# Patient Record
Sex: FEMALE | Race: WHITE | NOT HISPANIC OR LATINO
[De-identification: names, ages, dates, MRNs, and addresses within clinical notes are randomized per-mention and may not be internally consistent; named-entity substitution may affect disease eponyms.]

---

## 2017-05-09 ENCOUNTER — APPOINTMENT (OUTPATIENT)
Dept: DERMATOLOGY | Facility: CLINIC | Age: 82
End: 2017-05-09

## 2017-05-09 VITALS — HEIGHT: 64 IN | BODY MASS INDEX: 21 KG/M2 | WEIGHT: 123 LBS

## 2017-05-09 DIAGNOSIS — Z78.9 OTHER SPECIFIED HEALTH STATUS: ICD-10-CM

## 2017-05-09 DIAGNOSIS — H91.90 UNSPECIFIED HEARING LOSS, UNSPECIFIED EAR: ICD-10-CM

## 2017-05-09 DIAGNOSIS — H26.9 UNSPECIFIED CATARACT: ICD-10-CM

## 2017-05-09 DIAGNOSIS — Z85.828 PERSONAL HISTORY OF OTHER MALIGNANT NEOPLASM OF SKIN: ICD-10-CM

## 2017-05-09 RX ORDER — MENTHOL/CAMPHOR 0.5 %-0.5%
1000 LOTION (ML) TOPICAL
Refills: 0 | Status: ACTIVE | COMMUNITY

## 2017-11-09 ENCOUNTER — APPOINTMENT (OUTPATIENT)
Dept: DERMATOLOGY | Facility: CLINIC | Age: 82
End: 2017-11-09
Payer: MEDICARE

## 2017-11-09 PROCEDURE — 99213 OFFICE O/P EST LOW 20 MIN: CPT

## 2017-12-11 ENCOUNTER — APPOINTMENT (OUTPATIENT)
Dept: DERMATOLOGY | Facility: CLINIC | Age: 82
End: 2017-12-11
Payer: MEDICARE

## 2017-12-11 DIAGNOSIS — R23.8 OTHER SKIN CHANGES: ICD-10-CM

## 2017-12-11 PROCEDURE — ZZZZZ: CPT

## 2017-12-11 RX ORDER — ATORVASTATIN CALCIUM 10 MG/1
10 TABLET, FILM COATED ORAL
Refills: 0 | Status: DISCONTINUED | COMMUNITY
End: 2017-12-11

## 2017-12-11 RX ORDER — ATORVASTATIN CALCIUM 20 MG/1
20 TABLET, FILM COATED ORAL
Qty: 90 | Refills: 0 | Status: ACTIVE | COMMUNITY
Start: 2017-12-08

## 2018-05-10 ENCOUNTER — APPOINTMENT (OUTPATIENT)
Dept: DERMATOLOGY | Facility: CLINIC | Age: 83
End: 2018-05-10
Payer: MEDICARE

## 2018-05-10 DIAGNOSIS — L85.3 XEROSIS CUTIS: ICD-10-CM

## 2018-05-10 PROCEDURE — 99213 OFFICE O/P EST LOW 20 MIN: CPT

## 2018-07-27 PROBLEM — Z78.9 ALCOHOL USE: Status: ACTIVE | Noted: 2017-05-09

## 2018-11-12 ENCOUNTER — APPOINTMENT (OUTPATIENT)
Dept: DERMATOLOGY | Facility: CLINIC | Age: 83
End: 2018-11-12
Payer: MEDICARE

## 2018-11-12 VITALS — HEIGHT: 64 IN | WEIGHT: 118 LBS | BODY MASS INDEX: 20.14 KG/M2

## 2018-11-12 PROCEDURE — 99213 OFFICE O/P EST LOW 20 MIN: CPT

## 2019-06-25 ENCOUNTER — APPOINTMENT (OUTPATIENT)
Dept: DERMATOLOGY | Facility: CLINIC | Age: 84
End: 2019-06-25
Payer: MEDICARE

## 2019-06-25 PROCEDURE — 99213 OFFICE O/P EST LOW 20 MIN: CPT

## 2019-06-25 RX ORDER — ASPIRIN 81 MG
81 TABLET, DELAYED RELEASE (ENTERIC COATED) ORAL
Refills: 0 | Status: DISCONTINUED | COMMUNITY
End: 2019-06-25

## 2019-06-25 NOTE — HISTORY OF PRESENT ILLNESS
[FreeTextEntry1] : Patient presents for skin examination. [de-identified] : Denies new, changing, bleeding or tender lesions on the skin over the past 6 months.\par

## 2019-06-25 NOTE — PHYSICAL EXAM
[Alert] : alert [Oriented x 3] : ~L oriented x 3 [Full Body Skin Exam Performed] : performed [Well Nourished] : well nourished [FreeTextEntry3] : A full skin exam was performed including the scalp, face, neck, chest, abdomen, back, buttocks, upper extremities and lower extremities.  The patient declined examination of the breasts and genitalia.  \par The exam did not reveal any evidence of skin cancer, showing only the following benign growths:\par Patterson pigmented nevi.\par Seborrheic keratoses.\par Lentigines.\par Cherry angioma.\par \par

## 2019-12-12 ENCOUNTER — APPOINTMENT (OUTPATIENT)
Dept: DERMATOLOGY | Facility: CLINIC | Age: 84
End: 2019-12-12
Payer: MEDICARE

## 2019-12-12 DIAGNOSIS — Z00.00 ENCOUNTER FOR GENERAL ADULT MEDICAL EXAMINATION W/OUT ABNORMAL FINDINGS: ICD-10-CM

## 2019-12-12 PROCEDURE — 99213 OFFICE O/P EST LOW 20 MIN: CPT

## 2019-12-12 NOTE — PHYSICAL EXAM
[Well Nourished] : well nourished [Alert] : alert [Oriented x 3] : ~L oriented x 3 [Full Body Skin Exam Performed] : performed [FreeTextEntry3] : A full skin exam was performed including the scalp, face, neck, chest, abdomen, back, buttocks, upper extremities and lower extremities.  The patient declined examination of the breasts and genitalia.  \par The exam did not reveal any evidence of skin cancer, showing only the following benign growths:\par Seborrheic keratoses.\par Lentigines.\par Cherry angioma.\par \par

## 2019-12-12 NOTE — HISTORY OF PRESENT ILLNESS
[FreeTextEntry1] : Patient presents for skin examination. [de-identified] : Denies new, changing, bleeding or tender lesions on the skin over the past 6 months.\par

## 2020-06-18 ENCOUNTER — APPOINTMENT (OUTPATIENT)
Dept: DERMATOLOGY | Facility: CLINIC | Age: 85
End: 2020-06-18
Payer: MEDICARE

## 2020-06-18 VITALS — BODY MASS INDEX: 20.14 KG/M2 | HEIGHT: 64 IN | WEIGHT: 118 LBS

## 2020-06-18 DIAGNOSIS — D48.5 NEOPLASM OF UNCERTAIN BEHAVIOR OF SKIN: ICD-10-CM

## 2020-06-18 DIAGNOSIS — L57.0 ACTINIC KERATOSIS: ICD-10-CM

## 2020-06-18 DIAGNOSIS — L82.1 OTHER SEBORRHEIC KERATOSIS: ICD-10-CM

## 2020-06-18 PROCEDURE — 99213 OFFICE O/P EST LOW 20 MIN: CPT | Mod: 25

## 2020-06-18 PROCEDURE — 17000 DESTRUCT PREMALG LESION: CPT | Mod: 59

## 2020-06-18 PROCEDURE — 11104 PUNCH BX SKIN SINGLE LESION: CPT

## 2020-06-18 NOTE — HISTORY OF PRESENT ILLNESS
[de-identified] : Notes rough lesion of the anterior scalp.  Present for over a month, with irritation.  No bleeding.  No self tx. [FreeTextEntry1] : Patient presents for skin examination.

## 2020-06-18 NOTE — ASSESSMENT
[FreeTextEntry1] : A complete skin examination was performed.  We discussed the importance of photoprotection, including the use of hats, protective clothing and sunscreens with an SPF of at least 30.  Sun avoidance was also discussed.  The ABCDE's of melanoma was discussed with the patient.  Regular skin exams are encouraged.\par \par r/o melanoma - right shin.\par Will call patient with results when available.

## 2020-06-18 NOTE — PHYSICAL EXAM
[Alert] : alert [Oriented x 3] : ~L oriented x 3 [Well Nourished] : well nourished [FreeTextEntry3] : A full skin exam was performed including the scalp, face, neck, chest, abdomen, back, buttocks, upper extremities and lower extremities.  The patient declined examination of the breasts and genitalia.  \par The exam did not reveal any evidence of skin cancer, showing only the following benign growths:\par Irvine pigmented nevi.\par Seborrheic keratoses.\par Lentigines.\par \par Keratotic papule of the anterior scalp.\par \par Blue-black, irregularly hyperpigmented macule, 2.5 mm, on the right shin.  ELM with central haze. [Full Body Skin Exam Performed] : performed

## 2020-06-25 LAB — CORE LAB BIOPSY: NORMAL

## 2021-04-04 ENCOUNTER — INPATIENT (INPATIENT)
Facility: HOSPITAL | Age: 86
LOS: 1 days | Discharge: HOME | End: 2021-04-06
Attending: INTERNAL MEDICINE | Admitting: INTERNAL MEDICINE
Payer: MEDICARE

## 2021-04-04 VITALS
OXYGEN SATURATION: 100 % | RESPIRATION RATE: 16 BRPM | HEART RATE: 84 BPM | SYSTOLIC BLOOD PRESSURE: 168 MMHG | DIASTOLIC BLOOD PRESSURE: 66 MMHG | TEMPERATURE: 98 F

## 2021-04-04 PROCEDURE — 99285 EMERGENCY DEPT VISIT HI MDM: CPT | Mod: CS,GC

## 2021-04-04 NOTE — ED ADULT TRIAGE NOTE - CHIEF COMPLAINT QUOTE
Pt arrives to ED s/p feeling weak during exercise today at 6pm with belching and "queasiness" which has now passed.  Pt felt weak and lowered herself to the ground.  Pt says her exercises were leg based.  Hx of HTN.  Pt takes baby ASA and Lipitor.  Pt denies CP.   ekg done

## 2021-04-05 DIAGNOSIS — N39.0 URINARY TRACT INFECTION, SITE NOT SPECIFIED: ICD-10-CM

## 2021-04-05 DIAGNOSIS — R55 SYNCOPE AND COLLAPSE: ICD-10-CM

## 2021-04-05 DIAGNOSIS — E78.5 HYPERLIPIDEMIA, UNSPECIFIED: ICD-10-CM

## 2021-04-05 DIAGNOSIS — I25.10 ATHEROSCLEROTIC HEART DISEASE OF NATIVE CORONARY ARTERY WITHOUT ANGINA PECTORIS: ICD-10-CM

## 2021-04-05 DIAGNOSIS — Z29.9 ENCOUNTER FOR PROPHYLACTIC MEASURES, UNSPECIFIED: ICD-10-CM

## 2021-04-05 DIAGNOSIS — I10 ESSENTIAL (PRIMARY) HYPERTENSION: ICD-10-CM

## 2021-04-05 LAB
ALBUMIN SERPL ELPH-MCNC: 4.2 G/DL — SIGNIFICANT CHANGE UP (ref 3.3–5)
ALP SERPL-CCNC: 97 U/L — SIGNIFICANT CHANGE UP (ref 40–120)
ALT FLD-CCNC: 25 U/L — SIGNIFICANT CHANGE UP (ref 4–33)
ANION GAP SERPL CALC-SCNC: 14 MMOL/L — SIGNIFICANT CHANGE UP (ref 7–14)
APPEARANCE UR: ABNORMAL
AST SERPL-CCNC: 29 U/L — SIGNIFICANT CHANGE UP (ref 4–32)
B PERT DNA SPEC QL NAA+PROBE: SIGNIFICANT CHANGE UP
BASOPHILS # BLD AUTO: 0.05 K/UL — SIGNIFICANT CHANGE UP (ref 0–0.2)
BASOPHILS NFR BLD AUTO: 0.4 % — SIGNIFICANT CHANGE UP (ref 0–2)
BILIRUB SERPL-MCNC: 0.4 MG/DL — SIGNIFICANT CHANGE UP (ref 0.2–1.2)
BILIRUB UR-MCNC: NEGATIVE — SIGNIFICANT CHANGE UP
BLOOD GAS VENOUS COMPREHENSIVE RESULT: SIGNIFICANT CHANGE UP
BUN SERPL-MCNC: 34 MG/DL — HIGH (ref 7–23)
C PNEUM DNA SPEC QL NAA+PROBE: SIGNIFICANT CHANGE UP
CALCIUM SERPL-MCNC: 9.6 MG/DL — SIGNIFICANT CHANGE UP (ref 8.4–10.5)
CHLORIDE SERPL-SCNC: 101 MMOL/L — SIGNIFICANT CHANGE UP (ref 98–107)
CO2 SERPL-SCNC: 24 MMOL/L — SIGNIFICANT CHANGE UP (ref 22–31)
COLOR SPEC: YELLOW — SIGNIFICANT CHANGE UP
CREAT SERPL-MCNC: 1.13 MG/DL — SIGNIFICANT CHANGE UP (ref 0.5–1.3)
DIFF PNL FLD: ABNORMAL
EOSINOPHIL # BLD AUTO: 0.01 K/UL — SIGNIFICANT CHANGE UP (ref 0–0.5)
EOSINOPHIL NFR BLD AUTO: 0.1 % — SIGNIFICANT CHANGE UP (ref 0–6)
FLUAV SUBTYP SPEC NAA+PROBE: SIGNIFICANT CHANGE UP
FLUBV RNA SPEC QL NAA+PROBE: SIGNIFICANT CHANGE UP
GLUCOSE SERPL-MCNC: 108 MG/DL — HIGH (ref 70–99)
GLUCOSE UR QL: NEGATIVE — SIGNIFICANT CHANGE UP
HADV DNA SPEC QL NAA+PROBE: SIGNIFICANT CHANGE UP
HCOV 229E RNA SPEC QL NAA+PROBE: SIGNIFICANT CHANGE UP
HCOV HKU1 RNA SPEC QL NAA+PROBE: SIGNIFICANT CHANGE UP
HCOV NL63 RNA SPEC QL NAA+PROBE: SIGNIFICANT CHANGE UP
HCOV OC43 RNA SPEC QL NAA+PROBE: SIGNIFICANT CHANGE UP
HCT VFR BLD CALC: 39.6 % — SIGNIFICANT CHANGE UP (ref 34.5–45)
HGB BLD-MCNC: 13.1 G/DL — SIGNIFICANT CHANGE UP (ref 11.5–15.5)
HMPV RNA SPEC QL NAA+PROBE: SIGNIFICANT CHANGE UP
HPIV1 RNA SPEC QL NAA+PROBE: SIGNIFICANT CHANGE UP
HPIV2 RNA SPEC QL NAA+PROBE: SIGNIFICANT CHANGE UP
HPIV3 RNA SPEC QL NAA+PROBE: SIGNIFICANT CHANGE UP
HPIV4 RNA SPEC QL NAA+PROBE: SIGNIFICANT CHANGE UP
IANC: 12.15 K/UL — HIGH (ref 1.5–8.5)
IMM GRANULOCYTES NFR BLD AUTO: 0.3 % — SIGNIFICANT CHANGE UP (ref 0–1.5)
KETONES UR-MCNC: ABNORMAL
LEUKOCYTE ESTERASE UR-ACNC: ABNORMAL
LYMPHOCYTES # BLD AUTO: 0.99 K/UL — LOW (ref 1–3.3)
LYMPHOCYTES # BLD AUTO: 7.1 % — LOW (ref 13–44)
MCHC RBC-ENTMCNC: 32.2 PG — SIGNIFICANT CHANGE UP (ref 27–34)
MCHC RBC-ENTMCNC: 33.1 GM/DL — SIGNIFICANT CHANGE UP (ref 32–36)
MCV RBC AUTO: 97.3 FL — SIGNIFICANT CHANGE UP (ref 80–100)
MONOCYTES # BLD AUTO: 0.65 K/UL — SIGNIFICANT CHANGE UP (ref 0–0.9)
MONOCYTES NFR BLD AUTO: 4.7 % — SIGNIFICANT CHANGE UP (ref 2–14)
NEUTROPHILS # BLD AUTO: 12.15 K/UL — HIGH (ref 1.8–7.4)
NEUTROPHILS NFR BLD AUTO: 87.4 % — HIGH (ref 43–77)
NITRITE UR-MCNC: NEGATIVE — SIGNIFICANT CHANGE UP
NRBC # BLD: 0 /100 WBCS — SIGNIFICANT CHANGE UP
NRBC # FLD: 0 K/UL — SIGNIFICANT CHANGE UP
PH UR: 5.5 — SIGNIFICANT CHANGE UP (ref 5–8)
PLATELET # BLD AUTO: 187 K/UL — SIGNIFICANT CHANGE UP (ref 150–400)
POTASSIUM SERPL-MCNC: 4.9 MMOL/L — SIGNIFICANT CHANGE UP (ref 3.5–5.3)
POTASSIUM SERPL-SCNC: 4.9 MMOL/L — SIGNIFICANT CHANGE UP (ref 3.5–5.3)
PROT SERPL-MCNC: 7.1 G/DL — SIGNIFICANT CHANGE UP (ref 6–8.3)
PROT UR-MCNC: ABNORMAL
RAPID RVP RESULT: SIGNIFICANT CHANGE UP
RBC # BLD: 4.07 M/UL — SIGNIFICANT CHANGE UP (ref 3.8–5.2)
RBC # FLD: 12.5 % — SIGNIFICANT CHANGE UP (ref 10.3–14.5)
RSV RNA SPEC QL NAA+PROBE: SIGNIFICANT CHANGE UP
RV+EV RNA SPEC QL NAA+PROBE: SIGNIFICANT CHANGE UP
SARS-COV-2 RNA SPEC QL NAA+PROBE: SIGNIFICANT CHANGE UP
SODIUM SERPL-SCNC: 139 MMOL/L — SIGNIFICANT CHANGE UP (ref 135–145)
SP GR SPEC: 1.03 — HIGH (ref 1.01–1.02)
TROPONIN T, HIGH SENSITIVITY RESULT: 14 NG/L — SIGNIFICANT CHANGE UP
TROPONIN T, HIGH SENSITIVITY RESULT: 16 NG/L — SIGNIFICANT CHANGE UP
UROBILINOGEN FLD QL: SIGNIFICANT CHANGE UP
WBC # BLD: 13.89 K/UL — HIGH (ref 3.8–10.5)
WBC # FLD AUTO: 13.89 K/UL — HIGH (ref 3.8–10.5)

## 2021-04-05 PROCEDURE — 71045 X-RAY EXAM CHEST 1 VIEW: CPT | Mod: 26

## 2021-04-05 PROCEDURE — 99223 1ST HOSP IP/OBS HIGH 75: CPT

## 2021-04-05 RX ORDER — ATORVASTATIN CALCIUM 80 MG/1
40 TABLET, FILM COATED ORAL AT BEDTIME
Refills: 0 | Status: DISCONTINUED | OUTPATIENT
Start: 2021-04-05 | End: 2021-04-06

## 2021-04-05 RX ORDER — LISINOPRIL 2.5 MG/1
10 TABLET ORAL
Refills: 0 | Status: DISCONTINUED | OUTPATIENT
Start: 2021-04-05 | End: 2021-04-06

## 2021-04-05 RX ORDER — ASPIRIN/CALCIUM CARB/MAGNESIUM 324 MG
81 TABLET ORAL DAILY
Refills: 0 | Status: DISCONTINUED | OUTPATIENT
Start: 2021-04-05 | End: 2021-04-06

## 2021-04-05 RX ORDER — CEFTRIAXONE 500 MG/1
1000 INJECTION, POWDER, FOR SOLUTION INTRAMUSCULAR; INTRAVENOUS EVERY 24 HOURS
Refills: 0 | Status: DISCONTINUED | OUTPATIENT
Start: 2021-04-05 | End: 2021-04-06

## 2021-04-05 RX ORDER — CEPHALEXIN 500 MG
500 CAPSULE ORAL EVERY 12 HOURS
Refills: 0 | Status: DISCONTINUED | OUTPATIENT
Start: 2021-04-05 | End: 2021-04-05

## 2021-04-05 RX ORDER — ACETAMINOPHEN 500 MG
650 TABLET ORAL ONCE
Refills: 0 | Status: COMPLETED | OUTPATIENT
Start: 2021-04-05 | End: 2021-04-05

## 2021-04-05 RX ADMIN — CEFTRIAXONE 100 MILLIGRAM(S): 500 INJECTION, POWDER, FOR SOLUTION INTRAMUSCULAR; INTRAVENOUS at 13:40

## 2021-04-05 RX ADMIN — LISINOPRIL 10 MILLIGRAM(S): 2.5 TABLET ORAL at 17:30

## 2021-04-05 RX ADMIN — ATORVASTATIN CALCIUM 40 MILLIGRAM(S): 80 TABLET, FILM COATED ORAL at 22:28

## 2021-04-05 RX ADMIN — Medication 650 MILLIGRAM(S): at 03:01

## 2021-04-05 RX ADMIN — Medication 81 MILLIGRAM(S): at 13:40

## 2021-04-05 RX ADMIN — Medication 500 MILLIGRAM(S): at 04:07

## 2021-04-05 NOTE — H&P ADULT - PROBLEM SELECTOR PLAN 2
Continue Keflex.   Check urine culture. Switch Keflex to Ceftriaxone.    Check urine culture.  D/C planning likely on 4/6.

## 2021-04-05 NOTE — H&P ADULT - HISTORY OF PRESENT ILLNESS
This is an 88 y/o F adm with PMHx of HTN, Hyperlipidemia, non-obstructive CAD presents with c/o feeling weak and SOB. Pt was doing her usual routine of exercising at home watching a video when she felt weak, SOB and felt nauseous.  Pt sat down on the ground and was too weak to get up for about an hour.  Pt felt better after an hour and went to the couch.  She called her daughter who brought her to [facility].  Pt did not have palpitations, chest pain, LOC, Vomiting, Diarrhea, fever, chills.  Pt did notice she has been having urinary urgency.  Pt has never had these symptoms before.  Pt is active and does cardio exercise 3 times a week and goes walking.  Pt had an Echo and Stress Test February 2021 which did not show any acute changes.      This is an 88 y/o F adm with PMHx of HTN, Hyperlipidemia, non-obstructive CAD presents with c/o feeling weak and SOB. Pt was doing her usual routine of exercising at home watching a video when she felt weak, faint, SOB and felt nauseous.  Pt sat down on the ground and was too weak to get up for about an hour.  Pt felt better after an hour and went to the couch.  She called her daughter who brought her to [facility].  Pt did not have palpitations, chest pain, LOC, Vomiting, Diarrhea, fever, chills.  Pt did notice she has been having urinary urgency.  Pt has never had these symptoms before.  Pt is active and does cardio exercise 3 times a week and goes walking.  Pt had an Echo and Stress Test February 2021 which did not show any acute changes.      This is an 88 y/o F adm with PMHx of HTN, Hyperlipidemia, MVP, non-obstructive CAD presents with c/o feeling weak and SOB. Pt was doing her usual routine of exercising at home watching a video when she felt weak, faint, SOB and felt nauseous.  Pt sat down on the ground and was too weak to get up for about an hour.  Pt felt better after an hour and went to the couch.  She called her daughter who brought her to [facility].  Pt did not have palpitations, chest pain, LOC, Vomiting, Diarrhea, fever, chills.  Pt did notice she has been having urinary urgency.  Pt has never had these symptoms before.  Pt is active and does cardio exercise 3 times a week and goes walking.  Pt had an Echo and Stress Test February 2021 which did not show any acute changes.

## 2021-04-05 NOTE — H&P ADULT - ASSESSMENT
This is an 88 y/o F adm with PMHx of HTN, Hyperlipidemia, non-obstructive CAD presents with c/o feeling weak and SOB. Pt diagnosed with UTI.  This is an 86 y/o F adm with PMHx of HTN, Hyperlipidemia, non-obstructive CAD presents with c/o feeling weak and SOB, Near Syncope. Pt diagnosed with UTI.  This is an 88 y/o F adm with PMHx of HTN, Hyperlipidemia, non-obstructive CAD, MVP presents with c/o feeling weak and SOB, Near Syncope. Pt diagnosed with UTI.

## 2021-04-05 NOTE — H&P ADULT - ATTENDING COMMENTS
86 yo F with hx of HTN p/w near syncope found to have UTI. will start ceftriaxone. f/u urine cx. obtain echo r/o structural heart disease given hx of mitral prolapse. check orthostatics. anticipate dc tomorrow if remains stable

## 2021-04-05 NOTE — ED ADULT NURSE NOTE - OBJECTIVE STATEMENT
88 yo F AAOx4 received to room 11 c/o generalized weakness s/p exercise tonight, denies SOB palpitations CP, appears well and in NAD, pending MD medina, no further orders at this time

## 2021-04-05 NOTE — ED PROVIDER NOTE - PHYSICAL EXAMINATION
Dr Bailey  well appearing female. mmm. EOMI. non icteric. no facial asymmetry no slurred speech  normal S1-S2  No resp distress. able to speak in full and clear sentences. no wheeze, rales or stridor.  soft nontender abdomen. no  rebound. no guarding. no sign of trauma. no CVAT   no pedal edema. no calf tenderness. normal pulses bilateral feet.  AO3 Dr Bailey  well appearing female. mmm. EOMI. non icteric. no facial asymmetry no slurred speech  normal S1-S2  No resp distress. able to speak in full and clear sentences. no wheeze, rales or stridor.  soft nontender abdomen. no  rebound. no guarding. no sign of trauma. no CVAT   no pedal edema. no calf tenderness. normal pulses bilateral feet.  AO3    Aenesh Tompkins, DO PGY-1:   CONSTITUTIONAL: well-appearing, in NAD  SKIN: Warm dry, normal skin turgor  HEAD: NCAT  EYES: EOMI, PERRLA, no scleral icterus, conjunctiva pink  ENT: normal pharynx with no erythema or exudates  NECK: Supple; non tender. Full ROM.  CARD: RRR, no murmurs, no carotid bruits  RESP: clear to ausculation b/l. No crackles or wheezing.  ABD: soft, non-tender, non-distended, no rebound or guarding.  EXT: No pedal edema, no calf tenderness  NEURO: normal motor. normal sensory.   PSYCH: Cooperative, appropriate.

## 2021-04-05 NOTE — ED PROVIDER NOTE - OBJECTIVE STATEMENT
Dr Bailey  88yo F hx of hypothyroidism pw episode of weakness this evening at 630pm. Pt was home exercising, felt her body weak, lowered herself to the floor. Sat down for a while, able to "crawl to couch" felt nauseous and sweaty.  No palpitations. No SOB or MARTINEZ. no chest pain. no focal weakness or numbness. Currently denies any complaints. States had an echo and stress test 6 weeks ago. Has been vaccinated. no travel hx. no fever or chills. no leg swelling or calf pain

## 2021-04-05 NOTE — H&P ADULT - PROBLEM SELECTOR PLAN 1
Check Orthostatics.   Encouraged PO hydration. Check Orthostatics.   Encourage PO hydration.  Monitor on Telemetry overnight.   2D Echo ordered.

## 2021-04-05 NOTE — H&P ADULT - NSICDXPASTMEDICALHX_GEN_ALL_CORE_FT
PAST MEDICAL HISTORY:  CAD (coronary artery disease) Non-obstructive- Had an angiogram years ago    HTN (hypertension)     Hyperlipidemia      PAST MEDICAL HISTORY:  CAD (coronary artery disease) Non-obstructive- Had an angiogram years ago    HTN (hypertension)     Hyperlipidemia     MVP (mitral valve prolapse)

## 2021-04-06 ENCOUNTER — TRANSCRIPTION ENCOUNTER (OUTPATIENT)
Age: 86
End: 2021-04-06

## 2021-04-06 VITALS
SYSTOLIC BLOOD PRESSURE: 155 MMHG | RESPIRATION RATE: 17 BRPM | HEART RATE: 98 BPM | OXYGEN SATURATION: 100 % | TEMPERATURE: 98 F | DIASTOLIC BLOOD PRESSURE: 60 MMHG

## 2021-04-06 LAB
ALBUMIN SERPL ELPH-MCNC: 3.9 G/DL — SIGNIFICANT CHANGE UP (ref 3.3–5)
ALP SERPL-CCNC: 81 U/L — SIGNIFICANT CHANGE UP (ref 40–120)
ALT FLD-CCNC: 22 U/L — SIGNIFICANT CHANGE UP (ref 4–33)
ANION GAP SERPL CALC-SCNC: 10 MMOL/L — SIGNIFICANT CHANGE UP (ref 7–14)
AST SERPL-CCNC: 26 U/L — SIGNIFICANT CHANGE UP (ref 4–32)
BASOPHILS # BLD AUTO: 0.06 K/UL — SIGNIFICANT CHANGE UP (ref 0–0.2)
BASOPHILS NFR BLD AUTO: 0.7 % — SIGNIFICANT CHANGE UP (ref 0–2)
BILIRUB SERPL-MCNC: 0.6 MG/DL — SIGNIFICANT CHANGE UP (ref 0.2–1.2)
BUN SERPL-MCNC: 28 MG/DL — HIGH (ref 7–23)
CALCIUM SERPL-MCNC: 9.2 MG/DL — SIGNIFICANT CHANGE UP (ref 8.4–10.5)
CHLORIDE SERPL-SCNC: 102 MMOL/L — SIGNIFICANT CHANGE UP (ref 98–107)
CO2 SERPL-SCNC: 25 MMOL/L — SIGNIFICANT CHANGE UP (ref 22–31)
COVID-19 SPIKE DOMAIN AB INTERP: POSITIVE
COVID-19 SPIKE DOMAIN ANTIBODY RESULT: >250 U/ML — HIGH
CREAT SERPL-MCNC: 0.92 MG/DL — SIGNIFICANT CHANGE UP (ref 0.5–1.3)
CULTURE RESULTS: NO GROWTH — SIGNIFICANT CHANGE UP
EOSINOPHIL # BLD AUTO: 0.14 K/UL — SIGNIFICANT CHANGE UP (ref 0–0.5)
EOSINOPHIL NFR BLD AUTO: 1.6 % — SIGNIFICANT CHANGE UP (ref 0–6)
GLUCOSE SERPL-MCNC: 87 MG/DL — SIGNIFICANT CHANGE UP (ref 70–99)
HCT VFR BLD CALC: 39.4 % — SIGNIFICANT CHANGE UP (ref 34.5–45)
HGB BLD-MCNC: 12.6 G/DL — SIGNIFICANT CHANGE UP (ref 11.5–15.5)
IANC: 6.12 K/UL — SIGNIFICANT CHANGE UP (ref 1.5–8.5)
IMM GRANULOCYTES NFR BLD AUTO: 0.3 % — SIGNIFICANT CHANGE UP (ref 0–1.5)
LYMPHOCYTES # BLD AUTO: 1.43 K/UL — SIGNIFICANT CHANGE UP (ref 1–3.3)
LYMPHOCYTES # BLD AUTO: 16.6 % — SIGNIFICANT CHANGE UP (ref 13–44)
MCHC RBC-ENTMCNC: 31.3 PG — SIGNIFICANT CHANGE UP (ref 27–34)
MCHC RBC-ENTMCNC: 32 GM/DL — SIGNIFICANT CHANGE UP (ref 32–36)
MCV RBC AUTO: 98 FL — SIGNIFICANT CHANGE UP (ref 80–100)
MONOCYTES # BLD AUTO: 0.84 K/UL — SIGNIFICANT CHANGE UP (ref 0–0.9)
MONOCYTES NFR BLD AUTO: 9.7 % — SIGNIFICANT CHANGE UP (ref 2–14)
NEUTROPHILS # BLD AUTO: 6.12 K/UL — SIGNIFICANT CHANGE UP (ref 1.8–7.4)
NEUTROPHILS NFR BLD AUTO: 71.1 % — SIGNIFICANT CHANGE UP (ref 43–77)
NRBC # BLD: 0 /100 WBCS — SIGNIFICANT CHANGE UP
NRBC # FLD: 0 K/UL — SIGNIFICANT CHANGE UP
PLATELET # BLD AUTO: 171 K/UL — SIGNIFICANT CHANGE UP (ref 150–400)
POTASSIUM SERPL-MCNC: 4.1 MMOL/L — SIGNIFICANT CHANGE UP (ref 3.5–5.3)
POTASSIUM SERPL-SCNC: 4.1 MMOL/L — SIGNIFICANT CHANGE UP (ref 3.5–5.3)
PROT SERPL-MCNC: 6.6 G/DL — SIGNIFICANT CHANGE UP (ref 6–8.3)
RBC # BLD: 4.02 M/UL — SIGNIFICANT CHANGE UP (ref 3.8–5.2)
RBC # FLD: 12.3 % — SIGNIFICANT CHANGE UP (ref 10.3–14.5)
SARS-COV-2 IGG+IGM SERPL QL IA: >250 U/ML — HIGH
SARS-COV-2 IGG+IGM SERPL QL IA: POSITIVE
SODIUM SERPL-SCNC: 137 MMOL/L — SIGNIFICANT CHANGE UP (ref 135–145)
SPECIMEN SOURCE: SIGNIFICANT CHANGE UP
WBC # BLD: 8.62 K/UL — SIGNIFICANT CHANGE UP (ref 3.8–10.5)
WBC # FLD AUTO: 8.62 K/UL — SIGNIFICANT CHANGE UP (ref 3.8–10.5)

## 2021-04-06 PROCEDURE — 93306 TTE W/DOPPLER COMPLETE: CPT | Mod: 26

## 2021-04-06 RX ORDER — CEFUROXIME AXETIL 250 MG
1 TABLET ORAL
Qty: 10 | Refills: 0
Start: 2021-04-06 | End: 2021-04-10

## 2021-04-06 RX ADMIN — LISINOPRIL 10 MILLIGRAM(S): 2.5 TABLET ORAL at 06:26

## 2021-04-06 RX ADMIN — CEFTRIAXONE 100 MILLIGRAM(S): 500 INJECTION, POWDER, FOR SOLUTION INTRAMUSCULAR; INTRAVENOUS at 13:43

## 2021-04-06 RX ADMIN — Medication 81 MILLIGRAM(S): at 13:42

## 2021-04-06 NOTE — DISCHARGE NOTE PROVIDER - PROVIDER TOKENS
FREE:[LAST:[Follow-up],PHONE:[(   )    -],FAX:[(   )    -],ADDRESS:[with Dr. Gamaliel Farfan within 1 week.]]

## 2021-04-06 NOTE — DISCHARGE NOTE PROVIDER - HOSPITAL COURSE
This is an 88 y/o F adm with PMHx of HTN, Hyperlipidemia, non-obstructive CAD, MVP presents with c/o feeling weak and SOB, Near Syncope. Pt diagnosed with UTI.       Near syncope.  Orthostatics negative.   Encourage PO hydration.  Monitor on Telemetry overnight.   2D Echo revealed.........................................       ·  UTI (urinary tract infection).  Plan: Switch Keflex to Ceftriaxone.    Check urine culture.      · HTN (hypertension).  Plan: Continue Lisinopril.   Monitor blood pressure.       · Hyperlipidemia.  Plan: Continue Lipitor.   Check fasting lipid profile.        ·CAD (coronary artery disease).  Plan: Continue ASA 81mg.     DVT prophylaxis. Plan: Pt is ambulating, improve score low risk. On ASA.    Case discussed with......................, pt is medically stable for discharge home today with outpatient follow-up.          This is an 86 y/o F adm with PMHx of HTN, Hyperlipidemia, non-obstructive CAD, MVP presents with c/o feeling weak and SOB, Near Syncope. Pt diagnosed with UTI.       1. Near syncope.  Orthostatics done.   Encourage PO hydration.  Monitor on Telemetry.   2D Echo revealed.........................................       2. UTI (urinary tract infection).  Plan: Switch Keflex to Ceftriaxone.    Urine culture pending.      3. HTN (hypertension).  Plan: Continue Lisinopril.   Monitor blood pressure.       4. Hyperlipidemia.  Plan: Continue Lipitor.   Check fasting lipid profile.        5. CAD (coronary artery disease).  Plan: Continue ASA 81mg.       Case discussed with Dr. Cheng, pt is medically stable for discharge home today with outpatient follow-up.          This is an 88 y/o F with PMHx of HTN, Hyperlipidemia, non-obstructive CAD, MVP presents with c/o feeling weak and SOB admitted for near syncope workup and UTI.      1. Near syncope.  Orthostatics done.   - Encourage PO hydration.  - Monitor on Telemetry.   - 2D Echo revealed normal EF, no RWMA.      2. UTI (urinary tract infection).    - On IV Rocephin 1g in-house  - UCx pending  - DC on Ceftin x  5 days to complete 7-day course      3. HTN (hypertension).    - C/w Lisinopril in house  - Monitor BP      4. Hyperlipidemia.   - C/w Statin        5. CAD (coronary artery disease).    - Continue ASA 81mg.       Case discussed with Dr. Cheng, pt is medically stable for discharge home today with outpatient follow-up.

## 2021-04-06 NOTE — DISCHARGE NOTE NURSING/CASE MANAGEMENT/SOCIAL WORK - PATIENT PORTAL LINK FT
You can access the FollowMyHealth Patient Portal offered by [facility] by registering at the following website: [email or web address]. By joining [employer]’s FollowMyHealth portal, you will also be able to view your health information using other applications (apps) compatible with our system.

## 2021-04-06 NOTE — DISCHARGE NOTE PROVIDER - NSDCCPCAREPLAN_GEN_ALL_CORE_FT
PRINCIPAL DISCHARGE DIAGNOSIS  Diagnosis: UTI (urinary tract infection)  Assessment and Plan of Treatment: You were diagnosed with a UTI.   Continue............  Follow-up with Dr. Gamaliel Farfan within 1 week.      SECONDARY DISCHARGE DIAGNOSES  Diagnosis: HTN (hypertension)  Assessment and Plan of Treatment: HTN (hypertension)  - Continue low salt diet.   - Continue Lisinopril.  - Monitor your blood pressure.    Diagnosis: Hyperlipidemia  Assessment and Plan of Treatment: Hyperlipidemia  - Continue Lipitor.   - Monitor your bloodwork with your PCP.    Diagnosis: Near syncope  Assessment and Plan of Treatment: Near syncope  - You had a Sonogram of the heart done which revealed................  - Follow-up with Dr. Farfan within 1 week.     PRINCIPAL DISCHARGE DIAGNOSIS  Diagnosis: UTI (urinary tract infection)  Assessment and Plan of Treatment: You were diagnosed with a UTI.  You will continue taking Ceftin 500mg twice daily for 5 more days to complete treatment for your UTI.  Follow-up with Dr. Gamaliel Farfan within 1 week.      SECONDARY DISCHARGE DIAGNOSES  Diagnosis: Near syncope  Assessment and Plan of Treatment: Near syncope  - You had a Sonogram of the heart done which revealed overall normal function of your heart. Follow-up with Dr. Farfan within 1 week.    Diagnosis: HTN (hypertension)  Assessment and Plan of Treatment: HTN (hypertension)  - Continue low salt diet.   - Continue Lisinopril.  - Monitor your blood pressure.    Diagnosis: Hyperlipidemia  Assessment and Plan of Treatment: Hyperlipidemia  - Continue Lipitor.   - Monitor your bloodwork with your PCP.

## 2021-04-06 NOTE — DISCHARGE NOTE PROVIDER - NSDCMRMEDTOKEN_GEN_ALL_CORE_FT
Aspirin Enteric Coated 81 mg oral delayed release tablet: 1 tab(s) orally once a day  Lipitor 40 mg oral tablet: 1 tab(s) orally once a day  lisinopril 10 mg oral tablet: 1 tab(s) orally 2 times a day   Aspirin Enteric Coated 81 mg oral delayed release tablet: 1 tab(s) orally once a day  cefuroxime 500 mg oral tablet: 1 tab(s) orally 2 times a day   Lipitor 40 mg oral tablet: 1 tab(s) orally once a day  lisinopril 10 mg oral tablet: 1 tab(s) orally 2 times a day

## 2021-04-06 NOTE — DISCHARGE NOTE PROVIDER - CARE PROVIDER_API CALL
Follow-up,   with Dr. Gamaliel Farfan within 1 week.  Phone: (   )    -  Fax: (   )    -  Follow Up Time:

## 2021-05-06 ENCOUNTER — EMERGENCY (EMERGENCY)
Facility: HOSPITAL | Age: 86
LOS: 1 days | Discharge: HOME | End: 2021-05-06
Attending: EMERGENCY MEDICINE | Admitting: EMERGENCY MEDICINE
Payer: MEDICARE

## 2021-05-06 VITALS
SYSTOLIC BLOOD PRESSURE: 170 MMHG | HEIGHT: 63 IN | RESPIRATION RATE: 16 BRPM | HEART RATE: 64 BPM | OXYGEN SATURATION: 100 % | TEMPERATURE: 98 F | DIASTOLIC BLOOD PRESSURE: 65 MMHG

## 2021-05-06 PROCEDURE — 93010 ELECTROCARDIOGRAM REPORT: CPT

## 2021-05-06 PROCEDURE — 99284 EMERGENCY DEPT VISIT MOD MDM: CPT | Mod: 25,GC

## 2021-05-06 NOTE — ED ADULT TRIAGE NOTE - CHIEF COMPLAINT QUOTE
Pt c/o an episode of generalized weakness, dizziness with double vision at approx 9pm while standing, reports dizziness/double vision has resolved. Also c/o an episode of incontinence. Denies any pain/discomfort. No neuro deficits noted at this time. . No stroke code to be called as per MD Butler. PMHx CAD, htn, hld

## 2021-05-07 PROBLEM — I10 ESSENTIAL (PRIMARY) HYPERTENSION: Chronic | Status: ACTIVE | Noted: 2021-04-05

## 2021-05-07 PROBLEM — I34.1 NONRHEUMATIC MITRAL (VALVE) PROLAPSE: Chronic | Status: ACTIVE | Noted: 2021-04-05

## 2021-05-07 PROBLEM — I25.10 ATHEROSCLEROTIC HEART DISEASE OF NATIVE CORONARY ARTERY WITHOUT ANGINA PECTORIS: Chronic | Status: ACTIVE | Noted: 2021-04-05

## 2021-05-07 PROBLEM — E78.5 HYPERLIPIDEMIA, UNSPECIFIED: Chronic | Status: ACTIVE | Noted: 2021-04-05

## 2021-05-07 LAB
ALBUMIN SERPL ELPH-MCNC: 4.3 G/DL — SIGNIFICANT CHANGE UP (ref 3.3–5)
ALP SERPL-CCNC: 98 U/L — SIGNIFICANT CHANGE UP (ref 40–120)
ALT FLD-CCNC: 25 U/L — SIGNIFICANT CHANGE UP (ref 4–33)
ANION GAP SERPL CALC-SCNC: 11 MMOL/L — SIGNIFICANT CHANGE UP (ref 7–14)
APPEARANCE UR: CLEAR — SIGNIFICANT CHANGE UP
AST SERPL-CCNC: 28 U/L — SIGNIFICANT CHANGE UP (ref 4–32)
BACTERIA # UR AUTO: ABNORMAL
BASOPHILS # BLD AUTO: 0.08 K/UL — SIGNIFICANT CHANGE UP (ref 0–0.2)
BASOPHILS NFR BLD AUTO: 0.5 % — SIGNIFICANT CHANGE UP (ref 0–2)
BILIRUB SERPL-MCNC: 0.4 MG/DL — SIGNIFICANT CHANGE UP (ref 0.2–1.2)
BILIRUB UR-MCNC: NEGATIVE — SIGNIFICANT CHANGE UP
BUN SERPL-MCNC: 28 MG/DL — HIGH (ref 7–23)
CALCIUM SERPL-MCNC: 10.1 MG/DL — SIGNIFICANT CHANGE UP (ref 8.4–10.5)
CHLORIDE SERPL-SCNC: 103 MMOL/L — SIGNIFICANT CHANGE UP (ref 98–107)
CO2 SERPL-SCNC: 26 MMOL/L — SIGNIFICANT CHANGE UP (ref 22–31)
COLOR SPEC: YELLOW — SIGNIFICANT CHANGE UP
CREAT SERPL-MCNC: 0.84 MG/DL — SIGNIFICANT CHANGE UP (ref 0.5–1.3)
DIFF PNL FLD: NEGATIVE — SIGNIFICANT CHANGE UP
EOSINOPHIL # BLD AUTO: 0.11 K/UL — SIGNIFICANT CHANGE UP (ref 0–0.5)
EOSINOPHIL NFR BLD AUTO: 0.7 % — SIGNIFICANT CHANGE UP (ref 0–6)
EPI CELLS # UR: 3 /HPF — SIGNIFICANT CHANGE UP (ref 0–5)
GLUCOSE SERPL-MCNC: 94 MG/DL — SIGNIFICANT CHANGE UP (ref 70–99)
GLUCOSE UR QL: NEGATIVE — SIGNIFICANT CHANGE UP
HCT VFR BLD CALC: 40.2 % — SIGNIFICANT CHANGE UP (ref 34.5–45)
HGB BLD-MCNC: 13 G/DL — SIGNIFICANT CHANGE UP (ref 11.5–15.5)
HYALINE CASTS # UR AUTO: 10 /LPF — HIGH (ref 0–7)
IANC: 14.39 K/UL — HIGH (ref 1.5–8.5)
IMM GRANULOCYTES NFR BLD AUTO: 0.3 % — SIGNIFICANT CHANGE UP (ref 0–1.5)
KETONES UR-MCNC: NEGATIVE — SIGNIFICANT CHANGE UP
LEUKOCYTE ESTERASE UR-ACNC: ABNORMAL
LYMPHOCYTES # BLD AUTO: 0.99 K/UL — LOW (ref 1–3.3)
LYMPHOCYTES # BLD AUTO: 6 % — LOW (ref 13–44)
MCHC RBC-ENTMCNC: 31.6 PG — SIGNIFICANT CHANGE UP (ref 27–34)
MCHC RBC-ENTMCNC: 32.3 GM/DL — SIGNIFICANT CHANGE UP (ref 32–36)
MCV RBC AUTO: 97.8 FL — SIGNIFICANT CHANGE UP (ref 80–100)
MONOCYTES # BLD AUTO: 0.95 K/UL — HIGH (ref 0–0.9)
MONOCYTES NFR BLD AUTO: 5.7 % — SIGNIFICANT CHANGE UP (ref 2–14)
NEUTROPHILS # BLD AUTO: 14.39 K/UL — HIGH (ref 1.8–7.4)
NEUTROPHILS NFR BLD AUTO: 86.8 % — HIGH (ref 43–77)
NITRITE UR-MCNC: NEGATIVE — SIGNIFICANT CHANGE UP
NRBC # BLD: 0 /100 WBCS — SIGNIFICANT CHANGE UP
NRBC # FLD: 0 K/UL — SIGNIFICANT CHANGE UP
PH UR: 6 — SIGNIFICANT CHANGE UP (ref 5–8)
PLATELET # BLD AUTO: 179 K/UL — SIGNIFICANT CHANGE UP (ref 150–400)
POTASSIUM SERPL-MCNC: 3.7 MMOL/L — SIGNIFICANT CHANGE UP (ref 3.5–5.3)
POTASSIUM SERPL-SCNC: 3.7 MMOL/L — SIGNIFICANT CHANGE UP (ref 3.5–5.3)
PROT SERPL-MCNC: 7.7 G/DL — SIGNIFICANT CHANGE UP (ref 6–8.3)
PROT UR-MCNC: ABNORMAL
RBC # BLD: 4.11 M/UL — SIGNIFICANT CHANGE UP (ref 3.8–5.2)
RBC # FLD: 12.3 % — SIGNIFICANT CHANGE UP (ref 10.3–14.5)
RBC CASTS # UR COMP ASSIST: 7 /HPF — HIGH (ref 0–4)
SODIUM SERPL-SCNC: 140 MMOL/L — SIGNIFICANT CHANGE UP (ref 135–145)
SP GR SPEC: 1.03 — HIGH (ref 1.01–1.02)
UROBILINOGEN FLD QL: ABNORMAL
WBC # BLD: 16.57 K/UL — HIGH (ref 3.8–10.5)
WBC # FLD AUTO: 16.57 K/UL — HIGH (ref 3.8–10.5)
WBC UR QL: 24 /HPF — HIGH (ref 0–5)

## 2021-05-07 RX ORDER — CEFPODOXIME PROXETIL 100 MG
100 TABLET ORAL ONCE
Refills: 0 | Status: DISCONTINUED | OUTPATIENT
Start: 2021-05-07 | End: 2021-05-10

## 2021-05-07 RX ORDER — SODIUM CHLORIDE 9 MG/ML
500 INJECTION INTRAMUSCULAR; INTRAVENOUS; SUBCUTANEOUS ONCE
Refills: 0 | Status: COMPLETED | OUTPATIENT
Start: 2021-05-07 | End: 2021-05-07

## 2021-05-07 RX ORDER — CEFPODOXIME PROXETIL 100 MG
1 TABLET ORAL
Qty: 14 | Refills: 0
Start: 2021-05-07 | End: 2021-05-13

## 2021-05-07 NOTE — ED PROVIDER NOTE - PATIENT PORTAL LINK FT
You can access the FollowMyHealth Patient Portal offered by [facility] by registering at the following website: [email or web address]. By joining [employer]’s FollowMyHealth portal, you will also be able to view your health information using other applications (apps) compatible with our system.

## 2021-05-07 NOTE — ED PROVIDER NOTE - PMH
CAD (coronary artery disease)  Non-obstructive- Had an angiogram years ago  HTN (hypertension)    Hyperlipidemia    MVP (mitral valve prolapse)

## 2021-05-07 NOTE — ED PROVIDER NOTE - CLINICAL SUMMARY MEDICAL DECISION MAKING FREE TEXT BOX
87F presents after an episode of dizziness, likely orthostatic vs vasovagal. May be from UTI again vs decreased PO today. Will check EKG, basics, UA. Pt well appearing will likely DC after workup with follow up to PMD.

## 2021-05-07 NOTE — ED PROVIDER NOTE - NSFOLLOWUPINSTRUCTIONS_ED_ALL_ED_FT
1. TAKE ALL MEDICATIONS AS DIRECTED.    2. FOR PAIN YOU CAN TAKE IBUPROFEN (MOTRIN, ADVIL) OR ACETAMINOPHEN (TYLENOL) AS NEEDED, AS DIRECTED ON PACKAGING.  3. FOLLOW UP WITH YOUR PRIMARY DOCTOR WITHIN 5 DAYS AS DIRECTED.  4. IF YOU HAD LABS OR IMAGING DONE, YOU WERE GIVEN COPIES OF ALL LABS AND/OR IMAGING RESULTS FROM YOUR ER VISIT--PLEASE TAKE THEM WITH YOU TO YOUR FOLLOW UP APPOINTMENTS.  5. IF NEEDED, CALL PATIENT ACCESS SERVICES AT [phone] TO FIND A PRIMARY CARE PHYSICIAN.  OR CALL [phone] TO MAKE AN APPOINTMENT WITH THE CLINIC.  6. RETURN TO THE ER FOR ANY WORSENING SYMPTOMS OR CONCERNS.     Urinary Tract Infection    A urinary tract infection (UTI) is an infection of any part of the urinary tract, which includes the kidneys, ureters, bladder, and urethra. Risk factors include ignoring your need to urinate, wiping back to front if female, being an uncircumcised male, and having diabetes or a weak immune system. Symptoms include frequent urination, pain or burning with urination, foul smelling urine, cloudy urine, pain in the lower abdomen, blood in the urine, and fever. If you were prescribed an antibiotic medicine, take it as told by your health care provider. Do not stop taking the antibiotic even if you start to feel better.    SEEK IMMEDIATE MEDICAL CARE IF YOU HAVE ANY OF THE FOLLOWING SYMPTOMS: severe back or abdominal pain, fever, inability to keep fluids or medicine down, dizziness/lightheadedness, or a change in mental status.

## 2021-05-07 NOTE — ED PROVIDER NOTE - PHYSICAL EXAMINATION
General: Well developed, well nourished  HEENT: Normocephalic and atraumatic, Trachea midline.   Cardiac: Normal S1 and S2 w/ RRR. No MRG.  Pulmonary: CTA bilaterally. No increased WOB.   Abdominal: Soft, NTND  Neurologic: CN II-XII intact. Muscle strength 5/5 in all extremities. tandem gait nl   Musculoskeletal: No limited ROM.  Vascular: Warm and well perfused  Skin: Color appropriate for race.   Psychiatric: Appropriate mood and affect. No apparent risk to self or others.  Sarath Frances M.D. PGY-3

## 2021-05-07 NOTE — ED PROVIDER NOTE - NS ED ROS FT
REVIEW OF SYSTEMS:  General: +dizziness, no fever, no chills  HEENT: no headache, no vision changes  Cardiac: no chest pain, no palpitations  Respiratory: no cough, no shortness of breath  Gastrointestinal: no abdominal pain, no nausea, no vomiting, no diarrhea  Genitourinary: no hematuria, no dysuria, no urinary frequency  Extremities: no extremity swelling, no extremity pain  Neuro: no focal weakness, no numbness/tingling of the extremities, no decreased sensation  Heme: no easy bleeding, no easy bruising  Skin: no jaundice,  no rashes, no lesions  All other ROS as documented in HPI  -Sarath Frances, PGY-3

## 2021-05-07 NOTE — ED PROVIDER NOTE - OBJECTIVE STATEMENT
87F PMH HTN, HLD presents with dizziness. Pt states today was eating a drinking less than usual. States tonight at 9pm she went to stand up and walk but felt very lightheaded. States she had to grab on to furniture to brace herself. Felt very out of it for a few minutes and then eventually sx resolved. Had urinary incontinence but no LOC. States has similar episode 2 months ago, at that time had a UTI. No chest pain, no SOB, no f/c. No dysuria or frequency. No recent illnesses. At time of exam states feeling better and asymptomatic.

## 2021-05-07 NOTE — ED PROVIDER NOTE - PROGRESS NOTE DETAILS
Labs show leukocytosis, UA consistent with UTI. results dw pt/family, questions answered. Will send abx to pharmacy, pt instructed on return precautions and to f/u with PMD. Mariano Frances M.D. PGY-3

## 2021-05-07 NOTE — ED ADULT NURSE NOTE - OBJECTIVE STATEMENT
Received patient to room 4 for dizziness. A&o4, ambulatory, pt stating had a "dizzy spell" today around 9pm, second time this happened this month. Denies LOC, or syncope, pt was laying down and went to stand up. Denies sensation at this time. Pt ambulated to bathroom without an issue, gait steady. Breathing equal and unlabored, denies headache, dizziness, cp, sob at this time. right 20G AC placed, labs sent.

## 2021-05-07 NOTE — ED PROVIDER NOTE - ATTENDING CONTRIBUTION TO CARE
88 y/o F with h/o hyperlipidemia, HTN here with dizziness.  Pt reports an episode of dizziness, described as lightheadedness, that occurred this evening around 9pm.  Pt states she was sitting and eating dinner, she stood up to walk over to the TV and started to feel lightheaded.  She endorses nausea, blurry vision, states she felt like she was going to pass out.  Pt reports similar symptoms for her recent hospitalization - she was found to have a UTI at the time, admitted for a cardiac work-up which was negative including echo.  Denies recent illness, fever, cough, sob, cp, back pain, vomiting, diarrhea.  Pt reports having urinary urgency at the time and incontinence as she did not make it to the bathroom.  No fall or LOC.  Sxs lasted a few min and self resolved, pt now back to baseline.  She states she has been in her usual state of health, but states she does not eat much during the day.  Well appearing, sitting comfortably in stretcher, awake and alert, nontoxic.  VSS.  NCAT EOMI PERRL.   Neck supple.  Lungs cta bl.  Cards nl S1/S2, RRR, no MRG.  Abd soft ntnd.  No focal neuro deficits, gait steady.  Poss dehydration, orthostasis, pt with recent normal cardiac w/u for near syncope, do not suspect primary cns, low suspicion for cardiac etiology.  Will obtain ekg, labs, hydrate, reassess.

## 2021-05-08 LAB
CULTURE RESULTS: SIGNIFICANT CHANGE UP
SPECIMEN SOURCE: SIGNIFICANT CHANGE UP

## 2021-05-09 ENCOUNTER — EMERGENCY (EMERGENCY)
Facility: HOSPITAL | Age: 86
LOS: 1 days | Discharge: HOME | End: 2021-05-09
Attending: EMERGENCY MEDICINE | Admitting: EMERGENCY MEDICINE
Payer: MEDICARE

## 2021-05-09 VITALS
SYSTOLIC BLOOD PRESSURE: 162 MMHG | TEMPERATURE: 98 F | RESPIRATION RATE: 15 BRPM | OXYGEN SATURATION: 100 % | DIASTOLIC BLOOD PRESSURE: 62 MMHG | HEART RATE: 81 BPM

## 2021-05-09 VITALS
HEIGHT: 63 IN | OXYGEN SATURATION: 100 % | DIASTOLIC BLOOD PRESSURE: 64 MMHG | TEMPERATURE: 97 F | RESPIRATION RATE: 16 BRPM | SYSTOLIC BLOOD PRESSURE: 113 MMHG | HEART RATE: 81 BPM

## 2021-05-09 LAB
ALBUMIN SERPL ELPH-MCNC: 4.2 G/DL — SIGNIFICANT CHANGE UP (ref 3.3–5)
ALP SERPL-CCNC: 88 U/L — SIGNIFICANT CHANGE UP (ref 40–120)
ALT FLD-CCNC: 18 U/L — SIGNIFICANT CHANGE UP (ref 4–33)
ANION GAP SERPL CALC-SCNC: 11 MMOL/L — SIGNIFICANT CHANGE UP (ref 7–14)
APPEARANCE UR: CLEAR — SIGNIFICANT CHANGE UP
AST SERPL-CCNC: 24 U/L — SIGNIFICANT CHANGE UP (ref 4–32)
BASOPHILS # BLD AUTO: 0.05 K/UL — SIGNIFICANT CHANGE UP (ref 0–0.2)
BASOPHILS NFR BLD AUTO: 0.4 % — SIGNIFICANT CHANGE UP (ref 0–2)
BILIRUB SERPL-MCNC: 0.4 MG/DL — SIGNIFICANT CHANGE UP (ref 0.2–1.2)
BILIRUB UR-MCNC: NEGATIVE — SIGNIFICANT CHANGE UP
BUN SERPL-MCNC: 25 MG/DL — HIGH (ref 7–23)
CALCIUM SERPL-MCNC: 9.4 MG/DL — SIGNIFICANT CHANGE UP (ref 8.4–10.5)
CHLORIDE SERPL-SCNC: 104 MMOL/L — SIGNIFICANT CHANGE UP (ref 98–107)
CO2 SERPL-SCNC: 26 MMOL/L — SIGNIFICANT CHANGE UP (ref 22–31)
COLOR SPEC: SIGNIFICANT CHANGE UP
CREAT SERPL-MCNC: 1.06 MG/DL — SIGNIFICANT CHANGE UP (ref 0.5–1.3)
DIFF PNL FLD: NEGATIVE — SIGNIFICANT CHANGE UP
EOSINOPHIL # BLD AUTO: 0.04 K/UL — SIGNIFICANT CHANGE UP (ref 0–0.5)
EOSINOPHIL NFR BLD AUTO: 0.3 % — SIGNIFICANT CHANGE UP (ref 0–6)
GLUCOSE SERPL-MCNC: 97 MG/DL — SIGNIFICANT CHANGE UP (ref 70–99)
GLUCOSE UR QL: NEGATIVE — SIGNIFICANT CHANGE UP
HCT VFR BLD CALC: 37.8 % — SIGNIFICANT CHANGE UP (ref 34.5–45)
HGB BLD-MCNC: 12.5 G/DL — SIGNIFICANT CHANGE UP (ref 11.5–15.5)
IANC: 10.46 K/UL — HIGH (ref 1.5–8.5)
IMM GRANULOCYTES NFR BLD AUTO: 0.3 % — SIGNIFICANT CHANGE UP (ref 0–1.5)
KETONES UR-MCNC: ABNORMAL
LEUKOCYTE ESTERASE UR-ACNC: NEGATIVE — SIGNIFICANT CHANGE UP
LYMPHOCYTES # BLD AUTO: 0.74 K/UL — LOW (ref 1–3.3)
LYMPHOCYTES # BLD AUTO: 6.1 % — LOW (ref 13–44)
MAGNESIUM SERPL-MCNC: 1.9 MG/DL — SIGNIFICANT CHANGE UP (ref 1.6–2.6)
MCHC RBC-ENTMCNC: 32.1 PG — SIGNIFICANT CHANGE UP (ref 27–34)
MCHC RBC-ENTMCNC: 33.1 GM/DL — SIGNIFICANT CHANGE UP (ref 32–36)
MCV RBC AUTO: 96.9 FL — SIGNIFICANT CHANGE UP (ref 80–100)
MONOCYTES # BLD AUTO: 0.73 K/UL — SIGNIFICANT CHANGE UP (ref 0–0.9)
MONOCYTES NFR BLD AUTO: 6.1 % — SIGNIFICANT CHANGE UP (ref 2–14)
NEUTROPHILS # BLD AUTO: 10.46 K/UL — HIGH (ref 1.8–7.4)
NEUTROPHILS NFR BLD AUTO: 86.8 % — HIGH (ref 43–77)
NITRITE UR-MCNC: NEGATIVE — SIGNIFICANT CHANGE UP
NRBC # BLD: 0 /100 WBCS — SIGNIFICANT CHANGE UP
NRBC # FLD: 0 K/UL — SIGNIFICANT CHANGE UP
PH UR: 6 — SIGNIFICANT CHANGE UP (ref 5–8)
PHOSPHATE SERPL-MCNC: 3.6 MG/DL — SIGNIFICANT CHANGE UP (ref 2.5–4.5)
PLATELET # BLD AUTO: 177 K/UL — SIGNIFICANT CHANGE UP (ref 150–400)
POTASSIUM SERPL-MCNC: 3.9 MMOL/L — SIGNIFICANT CHANGE UP (ref 3.5–5.3)
POTASSIUM SERPL-SCNC: 3.9 MMOL/L — SIGNIFICANT CHANGE UP (ref 3.5–5.3)
PROT SERPL-MCNC: 7.2 G/DL — SIGNIFICANT CHANGE UP (ref 6–8.3)
PROT UR-MCNC: ABNORMAL
RBC # BLD: 3.9 M/UL — SIGNIFICANT CHANGE UP (ref 3.8–5.2)
RBC # FLD: 12.4 % — SIGNIFICANT CHANGE UP (ref 10.3–14.5)
SARS-COV-2 RNA SPEC QL NAA+PROBE: SIGNIFICANT CHANGE UP
SODIUM SERPL-SCNC: 141 MMOL/L — SIGNIFICANT CHANGE UP (ref 135–145)
SP GR SPEC: 1.01 — SIGNIFICANT CHANGE UP (ref 1.01–1.02)
TROPONIN T, HIGH SENSITIVITY RESULT: 11 NG/L — SIGNIFICANT CHANGE UP
TROPONIN T, HIGH SENSITIVITY RESULT: 14 NG/L — SIGNIFICANT CHANGE UP
TSH SERPL-MCNC: 3 UIU/ML — SIGNIFICANT CHANGE UP (ref 0.27–4.2)
UROBILINOGEN FLD QL: SIGNIFICANT CHANGE UP
WBC # BLD: 12.06 K/UL — HIGH (ref 3.8–10.5)
WBC # FLD AUTO: 12.06 K/UL — HIGH (ref 3.8–10.5)

## 2021-05-09 PROCEDURE — 93010 ELECTROCARDIOGRAM REPORT: CPT

## 2021-05-09 PROCEDURE — 99284 EMERGENCY DEPT VISIT MOD MDM: CPT | Mod: CS,25

## 2021-05-09 PROCEDURE — 71046 X-RAY EXAM CHEST 2 VIEWS: CPT | Mod: 26

## 2021-05-09 RX ORDER — SODIUM CHLORIDE 9 MG/ML
1000 INJECTION INTRAMUSCULAR; INTRAVENOUS; SUBCUTANEOUS ONCE
Refills: 0 | Status: COMPLETED | OUTPATIENT
Start: 2021-05-09 | End: 2021-05-09

## 2021-05-09 RX ADMIN — SODIUM CHLORIDE 1000 MILLILITER(S): 9 INJECTION INTRAMUSCULAR; INTRAVENOUS; SUBCUTANEOUS at 14:25

## 2021-05-09 NOTE — ED ADULT NURSE NOTE - OBJECTIVE STATEMENT
Pt. is A&Ox4 ambulatory presents to ER c/o near syncope episode this afternoon. Pt. states she was standing for along time in kitchen, felt weak, sat down, felt nauseous and dizzy. Pt. reports being here for similar episodes in the past week. Pt. states history of HTN and HLD. Pt. denies LOC, denies falling or trauma to head. Pt. denies vision changes, CP, SOB, N/V/D, and fevers. Respirations even and unlabored, abdomen nontender and nondistended, skin intact. 20G IV obtained in LAC, blood obtained, flushing without resistance, dressing dry and intact. PT. placed on CM, NSR at this time. Safety precautions obtained, call bell given to pt.

## 2021-05-09 NOTE — ED PROVIDER NOTE - NS ED ROS FT
Constitutional: (-) Fever, (-) Chills, (-) Anorexia  Skin: (-) Color changes, (-) Rashes, (-) Wounds  Eyes: (-) Visual changes, (-) Discharge, (-) Redness  Ears: (-) Hearing loss, (-)Tinnitus, (-) Ear pain  Nose: (-) Nasal congestion, (-) Runny nose  Mouth/Throat: (-) Sore throat  CV: (-) Chest pain, (-) Palpitations, (-) Diaphoresis, (-) Extremity Swelling, (-) Syncope  Resp: (-) Cough, (-) Shortness of breath, (-) Dyspnea on Exertion, (-) Wheezing  GI: (-) Abdominal pain, (-) Nausea, (-) Vomiting, (-) Diarrhea  : (-) Dysuria, (-) Hematuria, (-) Increased frequency  MSK: (-) Weakness,-) Myalgias, (-) Back pain, (-) Calf pain, (-) Neck pain  Neuro: (-) Loss of consciousness, (-) Headache, (-) Seizure, (-) Confusion

## 2021-05-09 NOTE — ED PROVIDER NOTE - ATTENDING CONTRIBUTION TO CARE
Dr. Amador: 86 yo female with HTN, HLD, in ED with with presyncopal episode.  Pt has had a few similar episodes in the last few months, has had echo and stress with cardiologist Freddy Zelaya, but no significant findings.  First episode occurred while pt was standing up exercising, second episode while she was getting up from couch to walk somewhere, today's episode occurred while pt had been standing for a while.  Pt states that when she has an episode she feels like her entire body is weak, like she is going to pass out, but she does not pass out.  No fall, head injury, LOC or other injuries.  No associated CP/SOB.  On arrival to ED today pt feeling well, at her baseline.  On exam pt well appearing, in NAD, heart RRR, lungs CTAB, abd NTND, extremities without swelling, strength 5/5 in all extremities and skin without rash.  EKG without acute findings.

## 2021-05-09 NOTE — ED PROVIDER NOTE - ENMT, MLM
Head atraumatic, symmetric. Airway patent, Nasal mucosa clear. Mouth with normal mucosa. Throat has no vesicles, no oropharyngeal exudates and uvula is midline.

## 2021-05-09 NOTE — ED PROVIDER NOTE - OBJECTIVE STATEMENT
87y Female with PMHx of HTN, HLD presents to the ER for near syncope. Patient states she was standing for prolonged period of time in the kitchen, felt weak so she sat down. When she sat down, she started to feeling nauseous and dizzy. Denies passing out or hitting her head. Denies use of anticoagulations. Patient reports similar episodes requiring 2 previous ER visits in the last week. Patient currently being treated for asymptomatic UTI. Denies chest pain, sob, headache, unilateral weakness, slurred speech or facial droop. States that she spoke with her PMD who is concerned that she may be having an arrythmia. Patient has outpatient cardiologist - states that she had an echo and stress test February 2021 which were normal. Of note, patient states she used to be on thyroid medication but she no longer takes it.

## 2021-05-09 NOTE — ED PROVIDER NOTE - CLINICAL SUMMARY MEDICAL DECISION MAKING FREE TEXT BOX
87y Female with PMHx of HTN, HLD presents to the ER for near syncope. Will get pre-syncope workup, low suspicion for ACS given lack of chest pain, sob and normal ekg/workup previously. Will check labs, cxr, reassess. Dispo pending results.

## 2021-05-09 NOTE — ED PROVIDER NOTE - NSFOLLOWUPINSTRUCTIONS_ED_ALL_ED_FT
Take Tylenol 650mg (Two 325 mg pills) every 4-6 hours as needed for pain.     Continue with antibiotics for UTI as previously described.    Dizziness    Dizziness can manifest as a feeling of unsteadiness or light-headedness. You may feel like you are about to faint. This condition can be caused by a number of things, including medicines, dehydration, or illness. Drink enough fluid to keep your urine clear or pale yellow. Do not drink alcohol and limit your caffeine intake. Avoid quick or sudden movements.  Rise slowly from chairs and steady yourself until you feel okay. In the morning, first sit up on the side of the bed.    SEEK IMMEDIATE MEDICAL CARE IF YOU HAVE ANY OF THE FOLLOWING SYMPTOMS: vomiting, changes in your vision or speech, weakness in your arms or legs, trouble speaking or swallowing, chest pain, abdominal pain, shortness of breath, sweating, bleeding, headache, neck pain, or fever.    Advance activity as tolerated.     Continue all previously prescribed medications as directed unless otherwise instructed.     Follow up with your primary care physician in 48-72 hours- bring copies of your results.     Follow up with your cardiologist as soon as possible.     If you have issues obtaining follow up, please call: [phone] to obtain a doctor or specialist who takes your insurance in your area.  You may call [phone] to make an appointment with the internal medicine clinic.

## 2021-05-09 NOTE — ED ADULT TRIAGE NOTE - CHIEF COMPLAINT QUOTE
pt had a near syncopal episode ~ 1 hour ago. pt had been standing on her feet for a long time, became weak, developed a cold sweat, and almost passed out. pt was seen here for similar symptoms twice in the past 3 weeks. diagnosed with a uti

## 2021-05-09 NOTE — ED ADULT NURSE NOTE - NSIMPLEMENTINTERV_GEN_ALL_ED
Implemented All Fall with Harm Risk Interventions:  [place] to call system. Call bell, personal items and telephone within reach. Instruct patient to call for assistance. Room bathroom lighting operational. Non-slip footwear when patient is off stretcher. Physically safe environment: no spills, clutter or unnecessary equipment. Stretcher in lowest position, wheels locked, appropriate side rails in place. Provide visual cue, wrist band, yellow gown, etc. Monitor gait and stability. Monitor for mental status changes and reorient to person, place, and time. Review medications for side effects contributing to fall risk. Reinforce activity limits and safety measures with patient and family. Provide visual clues: red socks.

## 2021-05-09 NOTE — ED PROVIDER NOTE - PATIENT PORTAL LINK FT
You can access the FollowMyHealth Patient Portal offered by [facility] by registering at the following website: [email or web address]. By joining [employer]’s FollowMyHealth portal, you will also be able to view your health information using other applications (apps) compatible with our system.

## 2021-05-09 NOTE — ED ADULT NURSE NOTE - CAS ELECT INFOMATION PROVIDED
Pt tolerated injection, given information sheet verbalized understanding and instructed to wait 15 minutes post injection.   DC instructions

## 2021-05-09 NOTE — ED PROVIDER NOTE - PROGRESS NOTE DETAILS
Kyle SANCHEZ: Patient feeling better. Seen walking to bathroom without dizziness. Vital signs remained stable. Spoke with patient and son Freddy who is a physician - all results reviewed, plan for PMD and cardiology follow up as soon as possible. Called patients PMD Dr. Gamaliel Farfan and Cardiologist Dr. Freddy Johnston whose services were covered by the same physician. Let them know patient was here. Advised they call first thing tomorrow morning for follow up. Will provide cardiology referral incase patient is unable to get prompt follow up with hers.

## 2021-05-09 NOTE — ED PROVIDER NOTE - CHIEF COMPLAINT
The patient is a 87y Female complaining of  The patient is a 87y Female complaining of near syncope.

## 2021-05-10 LAB
CULTURE RESULTS: NO GROWTH — SIGNIFICANT CHANGE UP
SPECIMEN SOURCE: SIGNIFICANT CHANGE UP

## 2021-05-25 ENCOUNTER — NON-APPOINTMENT (OUTPATIENT)
Age: 86
End: 2021-05-25

## 2021-06-16 ENCOUNTER — APPOINTMENT (OUTPATIENT)
Dept: DERMATOLOGY | Facility: CLINIC | Age: 86
End: 2021-06-16
Payer: MEDICARE

## 2021-06-16 DIAGNOSIS — H00.12 CHALAZION RIGHT LOWER EYELID: ICD-10-CM

## 2021-06-16 DIAGNOSIS — D18.01 HEMANGIOMA OF SKIN AND SUBCUTANEOUS TISSUE: ICD-10-CM

## 2021-06-16 DIAGNOSIS — L81.4 OTHER MELANIN HYPERPIGMENTATION: ICD-10-CM

## 2021-06-16 DIAGNOSIS — D22.9 MELANOCYTIC NEVI, UNSPECIFIED: ICD-10-CM

## 2021-06-16 PROCEDURE — 99213 OFFICE O/P EST LOW 20 MIN: CPT

## 2021-06-16 RX ORDER — ASPIRIN ENTERIC COATED TABLETS 81 MG 81 MG/1
81 TABLET, DELAYED RELEASE ORAL
Refills: 0 | Status: ACTIVE | COMMUNITY
Start: 2021-06-16

## 2021-06-16 NOTE — PHYSICAL EXAM
[Alert] : alert [Oriented x 3] : ~L oriented x 3 [Well Nourished] : well nourished [Full Body Skin Exam Performed] : performed [FreeTextEntry3] : A full skin exam was performed including the scalp, face, neck, chest, abdomen, back, buttocks, upper extremities and lower extremities.  The patient declined examination of the breasts and genitalia.  \par The exam did show the following benign growths:\par [place] pigmented nevi.\par Lentigines.\par Cherry angioma.\par \par Chalazion of the right lower eyelid margin.\par

## 2021-06-16 NOTE — ASSESSMENT
[FreeTextEntry1] : A complete skin examination was performed.  There is no evidence of skin cancer.  We discussed the importance of photoprotection, including the use of hats, protective clothing and sunscreens with an SPF of at least 30.  Sun avoidance was also discussed.  The ABCDE's of melanoma was discussed.  Regular skin exams recommended.\par \par Chalazion\par Education.\par J&J Baby Shampoo washes recommended qid with gentle message.\par

## 2021-09-23 ENCOUNTER — EMERGENCY (EMERGENCY)
Facility: HOSPITAL | Age: 86
LOS: 1 days | Discharge: HOME | End: 2021-09-23
Attending: EMERGENCY MEDICINE | Admitting: EMERGENCY MEDICINE
Payer: MEDICARE

## 2021-09-23 VITALS
HEART RATE: 62 BPM | RESPIRATION RATE: 18 BRPM | SYSTOLIC BLOOD PRESSURE: 98 MMHG | HEIGHT: 63 IN | OXYGEN SATURATION: 100 % | DIASTOLIC BLOOD PRESSURE: 47 MMHG

## 2021-09-23 VITALS
SYSTOLIC BLOOD PRESSURE: 122 MMHG | OXYGEN SATURATION: 100 % | RESPIRATION RATE: 16 BRPM | DIASTOLIC BLOOD PRESSURE: 60 MMHG | HEART RATE: 88 BPM

## 2021-09-23 LAB
ALBUMIN SERPL ELPH-MCNC: 4.3 G/DL — SIGNIFICANT CHANGE UP (ref 3.3–5)
ALP SERPL-CCNC: 73 U/L — SIGNIFICANT CHANGE UP (ref 40–120)
ALT FLD-CCNC: 18 U/L — SIGNIFICANT CHANGE UP (ref 4–33)
ANION GAP SERPL CALC-SCNC: 13 MMOL/L — SIGNIFICANT CHANGE UP (ref 7–14)
AST SERPL-CCNC: 22 U/L — SIGNIFICANT CHANGE UP (ref 4–32)
BASOPHILS # BLD AUTO: 0.06 K/UL — SIGNIFICANT CHANGE UP (ref 0–0.2)
BASOPHILS NFR BLD AUTO: 0.4 % — SIGNIFICANT CHANGE UP (ref 0–2)
BILIRUB SERPL-MCNC: 0.5 MG/DL — SIGNIFICANT CHANGE UP (ref 0.2–1.2)
BUN SERPL-MCNC: 33 MG/DL — HIGH (ref 7–23)
CALCIUM SERPL-MCNC: 9.5 MG/DL — SIGNIFICANT CHANGE UP (ref 8.4–10.5)
CHLORIDE SERPL-SCNC: 105 MMOL/L — SIGNIFICANT CHANGE UP (ref 98–107)
CO2 SERPL-SCNC: 23 MMOL/L — SIGNIFICANT CHANGE UP (ref 22–31)
CREAT SERPL-MCNC: 1.46 MG/DL — HIGH (ref 0.5–1.3)
EOSINOPHIL # BLD AUTO: 0.02 K/UL — SIGNIFICANT CHANGE UP (ref 0–0.5)
EOSINOPHIL NFR BLD AUTO: 0.1 % — SIGNIFICANT CHANGE UP (ref 0–6)
GLUCOSE SERPL-MCNC: 101 MG/DL — HIGH (ref 70–99)
HCT VFR BLD CALC: 38 % — SIGNIFICANT CHANGE UP (ref 34.5–45)
HGB BLD-MCNC: 12.5 G/DL — SIGNIFICANT CHANGE UP (ref 11.5–15.5)
IANC: 12.57 K/UL — HIGH (ref 1.5–8.5)
IMM GRANULOCYTES NFR BLD AUTO: 0.5 % — SIGNIFICANT CHANGE UP (ref 0–1.5)
LYMPHOCYTES # BLD AUTO: 1.08 K/UL — SIGNIFICANT CHANGE UP (ref 1–3.3)
LYMPHOCYTES # BLD AUTO: 7.3 % — LOW (ref 13–44)
MCHC RBC-ENTMCNC: 32.2 PG — SIGNIFICANT CHANGE UP (ref 27–34)
MCHC RBC-ENTMCNC: 32.9 GM/DL — SIGNIFICANT CHANGE UP (ref 32–36)
MCV RBC AUTO: 97.9 FL — SIGNIFICANT CHANGE UP (ref 80–100)
MONOCYTES # BLD AUTO: 0.94 K/UL — HIGH (ref 0–0.9)
MONOCYTES NFR BLD AUTO: 6.4 % — SIGNIFICANT CHANGE UP (ref 2–14)
NEUTROPHILS # BLD AUTO: 12.57 K/UL — HIGH (ref 1.8–7.4)
NEUTROPHILS NFR BLD AUTO: 85.3 % — HIGH (ref 43–77)
NRBC # BLD: 0 /100 WBCS — SIGNIFICANT CHANGE UP
NRBC # FLD: 0 K/UL — SIGNIFICANT CHANGE UP
PLATELET # BLD AUTO: 166 K/UL — SIGNIFICANT CHANGE UP (ref 150–400)
POTASSIUM SERPL-MCNC: 4.5 MMOL/L — SIGNIFICANT CHANGE UP (ref 3.5–5.3)
POTASSIUM SERPL-SCNC: 4.5 MMOL/L — SIGNIFICANT CHANGE UP (ref 3.5–5.3)
PROT SERPL-MCNC: 6.9 G/DL — SIGNIFICANT CHANGE UP (ref 6–8.3)
RBC # BLD: 3.88 M/UL — SIGNIFICANT CHANGE UP (ref 3.8–5.2)
RBC # FLD: 12.6 % — SIGNIFICANT CHANGE UP (ref 10.3–14.5)
SODIUM SERPL-SCNC: 141 MMOL/L — SIGNIFICANT CHANGE UP (ref 135–145)
TROPONIN T, HIGH SENSITIVITY RESULT: 19 NG/L — SIGNIFICANT CHANGE UP
TROPONIN T, HIGH SENSITIVITY RESULT: 19 NG/L — SIGNIFICANT CHANGE UP
WBC # BLD: 14.74 K/UL — HIGH (ref 3.8–10.5)
WBC # FLD AUTO: 14.74 K/UL — HIGH (ref 3.8–10.5)

## 2021-09-23 PROCEDURE — 93291 INTERROG DEV EVAL SCRMS IP: CPT | Mod: 26

## 2021-09-23 PROCEDURE — 99284 EMERGENCY DEPT VISIT MOD MDM: CPT

## 2021-09-23 RX ORDER — SODIUM CHLORIDE 9 MG/ML
1000 INJECTION, SOLUTION INTRAVENOUS ONCE
Refills: 0 | Status: COMPLETED | OUTPATIENT
Start: 2021-09-23 | End: 2021-09-23

## 2021-09-23 RX ADMIN — SODIUM CHLORIDE 1000 MILLILITER(S): 9 INJECTION, SOLUTION INTRAVENOUS at 16:47

## 2021-09-23 NOTE — ED PROVIDER NOTE - PATIENT PORTAL LINK FT
You can access the FollowMyHealth Patient Portal offered by [facility] by registering at the following website: [email or web address]. By joining [employer]’s FollowMyHealth portal, you will also be able to view your health information using other applications (apps) compatible with our system.

## 2021-09-23 NOTE — ED PROVIDER NOTE - CLINICAL SUMMARY MEDICAL DECISION MAKING FREE TEXT BOX
Anastasia-PGY3: 87 year old female with PMH HTN, HLD, multiple syncopal episodes s/p ILR presents with syncopal episode today. Pt states she was sitting at the diner, felt "dark vision" and leaned over with LOC. Pt admits to nausea afterwards. Denies any complaints at this time and states she feels well. No evidence of a cardiac arrythmia such as [place], WPW, HOCM, long or short QT.  Neurologic exam is nonfocal, not c/w CVA or primary neurologic abnormality. Will obtain labs, imaging, ILR interrogation, and reassessment with dispo accordingly.

## 2021-09-23 NOTE — ED ADULT TRIAGE NOTE - CHIEF COMPLAINT QUOTE
Pt brought in from diner, c/o of witnessed syncopal episode today, with + loc and no head injury. Pt currently on loop recorder due to previous syncopal episodes. Pt noted to be hypotensive and diaphoretic on ems arrival. 22g iv to right wrist placed by ems, received approx. 400cc of normal saline. denies dizziness, chest pain, sob. unable to obtain temp in triage.   fs 148

## 2021-09-23 NOTE — ED ADULT NURSE NOTE - OBJECTIVE STATEMENT
Patient alert and awake, oriented x4, breathing with ease on room air, skin intact, ambulates with steady gait. Patient reports having lunch with a friend today when she had a syncope episode, denies hitting head but reports friend saw her down for a few minutes. Patient reports feeling back to baseline at this time, reports 2 prior syncope episodes in the past and had loop recordings afterwards without any new dx. Patient reports hx HTN but no other cardiac problems. Patient reports lives alone. Patient reports received two doses of covid19 vaccine, last dose in Feb 2021. Patient maintained on continuous cardiac monitoring.

## 2021-09-23 NOTE — ED ADULT NURSE NOTE - NS ED NURSE LEVEL OF CONSCIOUSNESS ORIENTATION
Yes, the patient is being discharged from [facility]...
Oriented - self; Oriented - place; Oriented - time

## 2021-09-23 NOTE — ED PROVIDER NOTE - NSICDXPASTMEDICALHX_GEN_ALL_CORE_FT
PAST MEDICAL HISTORY:  CAD (coronary artery disease) Non-obstructive- Had an angiogram years ago    HTN (hypertension)     Hyperlipidemia     MVP (mitral valve prolapse)

## 2021-09-23 NOTE — ED PROVIDER NOTE - PHYSICAL EXAMINATION
CONSTITUTIONAL: non-toxic, well appearing  SKIN: no rash, no petechiae.  EYES: PERRL, EOMI, pink conjunctiva, anicteric  ENT: tongue and uvular midline, no exudates, moist mucous membranes  NECK: Supple; no meningismus, no JVD  CARD: RRR, no murmurs, equal radial pulses bilaterally 2+  RESP: CTAB, no respiratory distress  ABD: Soft, non-tender, non-distended, no peritoneal signs  EXT: Normal ROM x4. No edema.   NEURO: Alert, oriented. Neuro exam nonfocal.  PSYCH: Cooperative, appropriate.

## 2021-09-23 NOTE — PROCEDURE NOTE - INTERROGATION NOTE: COMMENTS
No event recorded correlated to her near syncopal episode today.  One tachy event recorded yesterday at 10:08 am correlated to her exercise.

## 2021-09-23 NOTE — ED PROVIDER NOTE - PROGRESS NOTE DETAILS
Miguel PGY3: Patient reevaluated and feeling better. Reviewed and discussed results with patient. Discussed importance of follow up and return precautions. Patient agrees with plan.

## 2021-09-23 NOTE — ED PROVIDER NOTE - OBJECTIVE STATEMENT
87 year old female with PMH HTN, HLD, multiple syncopal episodes s/p ILR presents with syncopal episode today. Pt states she was sitting at the diner, felt "dark vision" and leaned over with LOC. Pt admits to nausea afterwards. Denies any complaints at this time and states she feels well. Denies any fevers, chest pain, shortness of breath, abdominal pain, vomiting, diarrhea, bloody stools, black tarry stools, dysuria, headache, vision change, numbness, weakness, or rash. 87 year old female with PMH HTN, HLD, multiple syncopal episodes s/p ILR presents with syncopal episode today. Pt states she was sitting at the diner, felt "dark vision" and leaned over with LOC. Pt admits to nausea afterwards. Denies any complaints at this time and states she feels well. Denies any fevers, chest pain, shortness of breath, abdominal pain, vomiting, diarrhea, bloody stools, black tarry stools, dysuria, headache, vision change, numbness, weakness, or rash.    Attendinyo female presents with syncope at a diner while having lunch with a friend.  had lightheadedness before hand and vision went dark.  feels fine now.

## 2021-09-23 NOTE — ED PROVIDER NOTE - NSFOLLOWUPINSTRUCTIONS_ED_ALL_ED_FT
- Continue all regular medications  - stay well hydrated  - Follow up with your primary doctor within 1 week  - You were given copies of labs and/or imaging results if applicable, please take them to your follow up appointments  - Return to the ER for any worsening symptoms or concerns

## 2022-01-01 ENCOUNTER — APPOINTMENT (OUTPATIENT)
Dept: ORTHOPEDIC SURGERY | Facility: CLINIC | Age: 87
End: 2022-01-01

## 2022-01-01 ENCOUNTER — NON-APPOINTMENT (OUTPATIENT)
Age: 87
End: 2022-01-01

## 2022-01-01 ENCOUNTER — APPOINTMENT (OUTPATIENT)
Dept: ORTHOPEDIC SURGERY | Facility: CLINIC | Age: 87
End: 2022-01-01
Payer: MEDICARE

## 2022-01-01 ENCOUNTER — EMERGENCY (EMERGENCY)
Facility: HOSPITAL | Age: 87
LOS: 1 days | Discharge: HOME | End: 2022-01-01
Attending: EMERGENCY MEDICINE
Payer: MEDICARE

## 2022-01-01 ENCOUNTER — APPOINTMENT (OUTPATIENT)
Dept: OPHTHALMOLOGY | Facility: CLINIC | Age: 87
End: 2022-01-01

## 2022-01-01 VITALS
RESPIRATION RATE: 16 BRPM | HEART RATE: 83 BPM | OXYGEN SATURATION: 99 % | TEMPERATURE: 99 F | SYSTOLIC BLOOD PRESSURE: 111 MMHG | DIASTOLIC BLOOD PRESSURE: 59 MMHG

## 2022-01-01 VITALS
SYSTOLIC BLOOD PRESSURE: 172 MMHG | BODY MASS INDEX: 21.87 KG/M2 | DIASTOLIC BLOOD PRESSURE: 77 MMHG | WEIGHT: 125 LBS | HEIGHT: 63.5 IN | HEART RATE: 62 BPM

## 2022-01-01 VITALS — BODY MASS INDEX: 21.52 KG/M2 | WEIGHT: 123 LBS | HEIGHT: 63.5 IN

## 2022-01-01 VITALS
HEART RATE: 60 BPM | WEIGHT: 145.06 LBS | DIASTOLIC BLOOD PRESSURE: 73 MMHG | RESPIRATION RATE: 20 BRPM | HEIGHT: 63 IN | SYSTOLIC BLOOD PRESSURE: 123 MMHG | OXYGEN SATURATION: 97 %

## 2022-01-01 DIAGNOSIS — S82.851A DISPLACED TRIMALLEOLAR FRACTURE OF RIGHT LOWER LEG, INITIAL ENCOUNTER FOR CLOSED FRACTURE: ICD-10-CM

## 2022-01-01 DIAGNOSIS — S82.851D DISPLACED TRIMALLEOLAR FRACTURE OF RIGHT LOWER LEG, SUBSEQUENT ENCOUNTER FOR CLOSED FRACTURE WITH ROUTINE HEALING: ICD-10-CM

## 2022-01-01 DIAGNOSIS — M76.821 POSTERIOR TIBIAL TENDINITIS, RIGHT LEG: ICD-10-CM

## 2022-01-01 LAB
ALBUMIN SERPL ELPH-MCNC: 4.2 G/DL — SIGNIFICANT CHANGE UP (ref 3.3–5)
ALP SERPL-CCNC: 158 U/L — HIGH (ref 40–120)
ALT FLD-CCNC: 79 U/L — HIGH (ref 10–45)
ANION GAP SERPL CALC-SCNC: 12 MMOL/L — SIGNIFICANT CHANGE UP (ref 5–17)
APPEARANCE UR: CLEAR — SIGNIFICANT CHANGE UP
AST SERPL-CCNC: 56 U/L — HIGH (ref 10–40)
BACTERIA # UR AUTO: NEGATIVE — SIGNIFICANT CHANGE UP
BASOPHILS # BLD AUTO: 0.06 K/UL — SIGNIFICANT CHANGE UP (ref 0–0.2)
BASOPHILS NFR BLD AUTO: 0.4 % — SIGNIFICANT CHANGE UP (ref 0–2)
BILIRUB SERPL-MCNC: 0.4 MG/DL — SIGNIFICANT CHANGE UP (ref 0.2–1.2)
BILIRUB UR-MCNC: NEGATIVE — SIGNIFICANT CHANGE UP
BUN SERPL-MCNC: 32 MG/DL — HIGH (ref 7–23)
CALCIUM SERPL-MCNC: 9.6 MG/DL — SIGNIFICANT CHANGE UP (ref 8.4–10.5)
CHLORIDE SERPL-SCNC: 105 MMOL/L — SIGNIFICANT CHANGE UP (ref 96–108)
CO2 SERPL-SCNC: 24 MMOL/L — SIGNIFICANT CHANGE UP (ref 22–31)
COLOR SPEC: YELLOW — SIGNIFICANT CHANGE UP
CREAT SERPL-MCNC: 1.03 MG/DL — SIGNIFICANT CHANGE UP (ref 0.5–1.3)
CULTURE RESULTS: SIGNIFICANT CHANGE UP
DIFF PNL FLD: ABNORMAL
EGFR: 52 ML/MIN/1.73M2 — LOW
EOSINOPHIL # BLD AUTO: 0.06 K/UL — SIGNIFICANT CHANGE UP (ref 0–0.5)
EOSINOPHIL NFR BLD AUTO: 0.4 % — SIGNIFICANT CHANGE UP (ref 0–6)
EPI CELLS # UR: 4 /HPF — SIGNIFICANT CHANGE UP
GLUCOSE SERPL-MCNC: 117 MG/DL — HIGH (ref 70–99)
GLUCOSE UR QL: NEGATIVE — SIGNIFICANT CHANGE UP
HCT VFR BLD CALC: 34.2 % — LOW (ref 34.5–45)
HGB BLD-MCNC: 11.3 G/DL — LOW (ref 11.5–15.5)
HYALINE CASTS # UR AUTO: 14 /LPF — HIGH (ref 0–2)
IMM GRANULOCYTES NFR BLD AUTO: 0.4 % — SIGNIFICANT CHANGE UP (ref 0–1.5)
KETONES UR-MCNC: SIGNIFICANT CHANGE UP
LEUKOCYTE ESTERASE UR-ACNC: ABNORMAL
LYMPHOCYTES # BLD AUTO: 1.07 K/UL — SIGNIFICANT CHANGE UP (ref 1–3.3)
LYMPHOCYTES # BLD AUTO: 7.9 % — LOW (ref 13–44)
MCHC RBC-ENTMCNC: 32.8 PG — SIGNIFICANT CHANGE UP (ref 27–34)
MCHC RBC-ENTMCNC: 33 GM/DL — SIGNIFICANT CHANGE UP (ref 32–36)
MCV RBC AUTO: 99.4 FL — SIGNIFICANT CHANGE UP (ref 80–100)
MONOCYTES # BLD AUTO: 0.81 K/UL — SIGNIFICANT CHANGE UP (ref 0–0.9)
MONOCYTES NFR BLD AUTO: 6 % — SIGNIFICANT CHANGE UP (ref 2–14)
NEUTROPHILS # BLD AUTO: 11.49 K/UL — HIGH (ref 1.8–7.4)
NEUTROPHILS NFR BLD AUTO: 84.9 % — HIGH (ref 43–77)
NITRITE UR-MCNC: NEGATIVE — SIGNIFICANT CHANGE UP
NRBC # BLD: 0 /100 WBCS — SIGNIFICANT CHANGE UP (ref 0–0)
PH UR: 5.5 — SIGNIFICANT CHANGE UP (ref 5–8)
PLATELET # BLD AUTO: 259 K/UL — SIGNIFICANT CHANGE UP (ref 150–400)
POTASSIUM SERPL-MCNC: 4.4 MMOL/L — SIGNIFICANT CHANGE UP (ref 3.5–5.3)
POTASSIUM SERPL-SCNC: 4.4 MMOL/L — SIGNIFICANT CHANGE UP (ref 3.5–5.3)
PROT SERPL-MCNC: 6.8 G/DL — SIGNIFICANT CHANGE UP (ref 6–8.3)
PROT UR-MCNC: ABNORMAL
RBC # BLD: 3.44 M/UL — LOW (ref 3.8–5.2)
RBC # FLD: 13.2 % — SIGNIFICANT CHANGE UP (ref 10.3–14.5)
RBC CASTS # UR COMP ASSIST: 5 /HPF — HIGH (ref 0–4)
SODIUM SERPL-SCNC: 141 MMOL/L — SIGNIFICANT CHANGE UP (ref 135–145)
SP GR SPEC: 1.02 — SIGNIFICANT CHANGE UP (ref 1.01–1.02)
SPECIMEN SOURCE: SIGNIFICANT CHANGE UP
TROPONIN T, HIGH SENSITIVITY RESULT: 35 NG/L — SIGNIFICANT CHANGE UP (ref 0–51)
TROPONIN T, HIGH SENSITIVITY RESULT: 41 NG/L — SIGNIFICANT CHANGE UP (ref 0–51)
UROBILINOGEN FLD QL: NEGATIVE — SIGNIFICANT CHANGE UP
WBC # BLD: 13.55 K/UL — HIGH (ref 3.8–10.5)
WBC # FLD AUTO: 13.55 K/UL — HIGH (ref 3.8–10.5)
WBC UR QL: 29 /HPF — HIGH (ref 0–5)

## 2022-01-01 PROCEDURE — 73610 X-RAY EXAM OF ANKLE: CPT | Mod: RT

## 2022-01-01 PROCEDURE — 85025 COMPLETE CBC W/AUTO DIFF WBC: CPT

## 2022-01-01 PROCEDURE — 99285 EMERGENCY DEPT VISIT HI MDM: CPT | Mod: 25

## 2022-01-01 PROCEDURE — 99213 OFFICE O/P EST LOW 20 MIN: CPT

## 2022-01-01 PROCEDURE — 87086 URINE CULTURE/COLONY COUNT: CPT

## 2022-01-01 PROCEDURE — 93005 ELECTROCARDIOGRAM TRACING: CPT

## 2022-01-01 PROCEDURE — 84484 ASSAY OF TROPONIN QUANT: CPT

## 2022-01-01 PROCEDURE — 36415 COLL VENOUS BLD VENIPUNCTURE: CPT

## 2022-01-01 PROCEDURE — 99024 POSTOP FOLLOW-UP VISIT: CPT

## 2022-01-01 PROCEDURE — 80053 COMPREHEN METABOLIC PANEL: CPT

## 2022-01-01 PROCEDURE — 92004 COMPRE OPH EXAM NEW PT 1/>: CPT

## 2022-01-01 PROCEDURE — 71045 X-RAY EXAM CHEST 1 VIEW: CPT | Mod: 26

## 2022-01-01 PROCEDURE — 71045 X-RAY EXAM CHEST 1 VIEW: CPT

## 2022-01-01 PROCEDURE — 93010 ELECTROCARDIOGRAM REPORT: CPT

## 2022-01-01 PROCEDURE — 99284 EMERGENCY DEPT VISIT MOD MDM: CPT

## 2022-01-01 PROCEDURE — 81001 URINALYSIS AUTO W/SCOPE: CPT

## 2022-01-01 RX ORDER — MELOXICAM 15 MG/1
15 TABLET ORAL DAILY
Qty: 21 | Refills: 2 | Status: ACTIVE | COMMUNITY
Start: 2022-01-01 | End: 1900-01-01

## 2022-03-09 ENCOUNTER — TRANSCRIPTION ENCOUNTER (OUTPATIENT)
Age: 87
End: 2022-03-09

## 2022-03-10 ENCOUNTER — INPATIENT (INPATIENT)
Facility: HOSPITAL | Age: 87
LOS: 0 days | Discharge: NURSING, REHAB OR LONG-TERM CARE | DRG: 494 | End: 2022-03-11
Attending: ORTHOPAEDIC SURGERY | Admitting: ORTHOPAEDIC SURGERY
Payer: MEDICARE

## 2022-03-10 VITALS
OXYGEN SATURATION: 96 % | HEIGHT: 63 IN | DIASTOLIC BLOOD PRESSURE: 80 MMHG | HEART RATE: 68 BPM | RESPIRATION RATE: 16 BRPM | TEMPERATURE: 98 F | SYSTOLIC BLOOD PRESSURE: 160 MMHG | WEIGHT: 125 LBS

## 2022-03-10 DIAGNOSIS — S82.841A DISPLACED BIMALLEOLAR FRACTURE OF RIGHT LOWER LEG, INITIAL ENCOUNTER FOR CLOSED FRACTURE: ICD-10-CM

## 2022-03-10 LAB
ALBUMIN SERPL ELPH-MCNC: 4.3 G/DL — SIGNIFICANT CHANGE UP (ref 3.3–5)
ALP SERPL-CCNC: 90 U/L — SIGNIFICANT CHANGE UP (ref 40–120)
ALT FLD-CCNC: 18 U/L — SIGNIFICANT CHANGE UP (ref 10–45)
ANION GAP SERPL CALC-SCNC: 10 MMOL/L — SIGNIFICANT CHANGE UP (ref 5–17)
ANION GAP SERPL CALC-SCNC: 12 MMOL/L — SIGNIFICANT CHANGE UP (ref 5–17)
APTT BLD: 24.7 SEC — LOW (ref 27.5–35.5)
AST SERPL-CCNC: 26 U/L — SIGNIFICANT CHANGE UP (ref 10–40)
BASOPHILS # BLD AUTO: 0.05 K/UL — SIGNIFICANT CHANGE UP (ref 0–0.2)
BASOPHILS NFR BLD AUTO: 0.4 % — SIGNIFICANT CHANGE UP (ref 0–2)
BILIRUB SERPL-MCNC: 0.2 MG/DL — SIGNIFICANT CHANGE UP (ref 0.2–1.2)
BLD GP AB SCN SERPL QL: NEGATIVE — SIGNIFICANT CHANGE UP
BUN SERPL-MCNC: 15 MG/DL — SIGNIFICANT CHANGE UP (ref 7–23)
BUN SERPL-MCNC: 27 MG/DL — HIGH (ref 7–23)
CALCIUM SERPL-MCNC: 9 MG/DL — SIGNIFICANT CHANGE UP (ref 8.4–10.5)
CALCIUM SERPL-MCNC: 9.2 MG/DL — SIGNIFICANT CHANGE UP (ref 8.4–10.5)
CHLORIDE SERPL-SCNC: 102 MMOL/L — SIGNIFICANT CHANGE UP (ref 96–108)
CHLORIDE SERPL-SCNC: 106 MMOL/L — SIGNIFICANT CHANGE UP (ref 96–108)
CO2 SERPL-SCNC: 25 MMOL/L — SIGNIFICANT CHANGE UP (ref 22–31)
CO2 SERPL-SCNC: 25 MMOL/L — SIGNIFICANT CHANGE UP (ref 22–31)
CREAT SERPL-MCNC: 0.73 MG/DL — SIGNIFICANT CHANGE UP (ref 0.5–1.3)
CREAT SERPL-MCNC: 0.88 MG/DL — SIGNIFICANT CHANGE UP (ref 0.5–1.3)
EGFR: 63 ML/MIN/1.73M2 — SIGNIFICANT CHANGE UP
EGFR: 79 ML/MIN/1.73M2 — SIGNIFICANT CHANGE UP
EOSINOPHIL # BLD AUTO: 0.04 K/UL — SIGNIFICANT CHANGE UP (ref 0–0.5)
EOSINOPHIL NFR BLD AUTO: 0.3 % — SIGNIFICANT CHANGE UP (ref 0–6)
GLUCOSE SERPL-MCNC: 125 MG/DL — HIGH (ref 70–99)
GLUCOSE SERPL-MCNC: 130 MG/DL — HIGH (ref 70–99)
HCT VFR BLD CALC: 36.3 % — SIGNIFICANT CHANGE UP (ref 34.5–45)
HCT VFR BLD CALC: 40.5 % — SIGNIFICANT CHANGE UP (ref 34.5–45)
HGB BLD-MCNC: 12.1 G/DL — SIGNIFICANT CHANGE UP (ref 11.5–15.5)
HGB BLD-MCNC: 13 G/DL — SIGNIFICANT CHANGE UP (ref 11.5–15.5)
IMM GRANULOCYTES NFR BLD AUTO: 0.6 % — SIGNIFICANT CHANGE UP (ref 0–1.5)
INR BLD: 1.01 RATIO — SIGNIFICANT CHANGE UP (ref 0.88–1.16)
LYMPHOCYTES # BLD AUTO: 1.23 K/UL — SIGNIFICANT CHANGE UP (ref 1–3.3)
LYMPHOCYTES # BLD AUTO: 10 % — LOW (ref 13–44)
MAGNESIUM SERPL-MCNC: 1.8 MG/DL — SIGNIFICANT CHANGE UP (ref 1.6–2.6)
MCHC RBC-ENTMCNC: 32.1 GM/DL — SIGNIFICANT CHANGE UP (ref 32–36)
MCHC RBC-ENTMCNC: 32.3 PG — SIGNIFICANT CHANGE UP (ref 27–34)
MCHC RBC-ENTMCNC: 32.4 PG — SIGNIFICANT CHANGE UP (ref 27–34)
MCHC RBC-ENTMCNC: 33.3 GM/DL — SIGNIFICANT CHANGE UP (ref 32–36)
MCV RBC AUTO: 100.5 FL — HIGH (ref 80–100)
MCV RBC AUTO: 97.1 FL — SIGNIFICANT CHANGE UP (ref 80–100)
MONOCYTES # BLD AUTO: 0.82 K/UL — SIGNIFICANT CHANGE UP (ref 0–0.9)
MONOCYTES NFR BLD AUTO: 6.7 % — SIGNIFICANT CHANGE UP (ref 2–14)
NEUTROPHILS # BLD AUTO: 10.1 K/UL — HIGH (ref 1.8–7.4)
NEUTROPHILS NFR BLD AUTO: 82 % — HIGH (ref 43–77)
NRBC # BLD: 0 /100 WBCS — SIGNIFICANT CHANGE UP (ref 0–0)
NRBC # BLD: 0 /100 WBCS — SIGNIFICANT CHANGE UP (ref 0–0)
PHOSPHATE SERPL-MCNC: 2.6 MG/DL — SIGNIFICANT CHANGE UP (ref 2.5–4.5)
PLATELET # BLD AUTO: 131 K/UL — LOW (ref 150–400)
PLATELET # BLD AUTO: 159 K/UL — SIGNIFICANT CHANGE UP (ref 150–400)
POTASSIUM SERPL-MCNC: 3.6 MMOL/L — SIGNIFICANT CHANGE UP (ref 3.5–5.3)
POTASSIUM SERPL-MCNC: 4.9 MMOL/L — SIGNIFICANT CHANGE UP (ref 3.5–5.3)
POTASSIUM SERPL-SCNC: 3.6 MMOL/L — SIGNIFICANT CHANGE UP (ref 3.5–5.3)
POTASSIUM SERPL-SCNC: 4.9 MMOL/L — SIGNIFICANT CHANGE UP (ref 3.5–5.3)
PROT SERPL-MCNC: 6.6 G/DL — SIGNIFICANT CHANGE UP (ref 6–8.3)
PROTHROM AB SERPL-ACNC: 11.7 SEC — SIGNIFICANT CHANGE UP (ref 10.5–13.4)
RBC # BLD: 3.74 M/UL — LOW (ref 3.8–5.2)
RBC # BLD: 4.03 M/UL — SIGNIFICANT CHANGE UP (ref 3.8–5.2)
RBC # FLD: 12.4 % — SIGNIFICANT CHANGE UP (ref 10.3–14.5)
RBC # FLD: 12.4 % — SIGNIFICANT CHANGE UP (ref 10.3–14.5)
RH IG SCN BLD-IMP: POSITIVE — SIGNIFICANT CHANGE UP
RH IG SCN BLD-IMP: POSITIVE — SIGNIFICANT CHANGE UP
SARS-COV-2 RNA SPEC QL NAA+PROBE: SIGNIFICANT CHANGE UP
SODIUM SERPL-SCNC: 139 MMOL/L — SIGNIFICANT CHANGE UP (ref 135–145)
SODIUM SERPL-SCNC: 141 MMOL/L — SIGNIFICANT CHANGE UP (ref 135–145)
TROPONIN T, HIGH SENSITIVITY RESULT: 14 NG/L — SIGNIFICANT CHANGE UP (ref 0–51)
WBC # BLD: 12.32 K/UL — HIGH (ref 3.8–10.5)
WBC # BLD: 12.69 K/UL — HIGH (ref 3.8–10.5)
WBC # FLD AUTO: 12.32 K/UL — HIGH (ref 3.8–10.5)
WBC # FLD AUTO: 12.69 K/UL — HIGH (ref 3.8–10.5)

## 2022-03-10 PROCEDURE — 73590 X-RAY EXAM OF LOWER LEG: CPT | Mod: 26,RT

## 2022-03-10 PROCEDURE — 76377 3D RENDER W/INTRP POSTPROCES: CPT | Mod: 26

## 2022-03-10 PROCEDURE — 73610 X-RAY EXAM OF ANKLE: CPT | Mod: 26,76,RT

## 2022-03-10 PROCEDURE — 70450 CT HEAD/BRAIN W/O DYE: CPT | Mod: 26,MA

## 2022-03-10 PROCEDURE — 73700 CT LOWER EXTREMITY W/O DYE: CPT | Mod: 26,RT,MA

## 2022-03-10 PROCEDURE — 72125 CT NECK SPINE W/O DYE: CPT | Mod: 26,MA

## 2022-03-10 PROCEDURE — 27829 TREAT LOWER LEG JOINT: CPT | Mod: RT

## 2022-03-10 PROCEDURE — 73630 X-RAY EXAM OF FOOT: CPT | Mod: 26,RT

## 2022-03-10 PROCEDURE — 71045 X-RAY EXAM CHEST 1 VIEW: CPT | Mod: 26

## 2022-03-10 PROCEDURE — 99285 EMERGENCY DEPT VISIT HI MDM: CPT | Mod: GC

## 2022-03-10 PROCEDURE — 27823 TREATMENT OF ANKLE FRACTURE: CPT | Mod: RT

## 2022-03-10 PROCEDURE — 93291 INTERROG DEV EVAL SCRMS IP: CPT | Mod: 26

## 2022-03-10 DEVICE — IMPLANTABLE DEVICE: Type: IMPLANTABLE DEVICE | Site: RIGHT | Status: FUNCTIONAL

## 2022-03-10 DEVICE — PLATE 6 HOLE: Type: IMPLANTABLE DEVICE | Site: RIGHT | Status: FUNCTIONAL

## 2022-03-10 DEVICE — SCREW CORT S-T 3.5X12MM: Type: IMPLANTABLE DEVICE | Site: RIGHT | Status: FUNCTIONAL

## 2022-03-10 DEVICE — KIT REPAIR SS TIGHTROPE W/DRV SYNDESMOSIS: Type: IMPLANTABLE DEVICE | Site: RIGHT | Status: FUNCTIONAL

## 2022-03-10 DEVICE — WASHER SM SCRW 7MM: Type: IMPLANTABLE DEVICE | Site: RIGHT | Status: FUNCTIONAL

## 2022-03-10 DEVICE — SCREW CORT S-T 3.5X14MM: Type: IMPLANTABLE DEVICE | Site: RIGHT | Status: FUNCTIONAL

## 2022-03-10 DEVICE — SCREW CORT S-T 3.5X16MM: Type: IMPLANTABLE DEVICE | Site: RIGHT | Status: FUNCTIONAL

## 2022-03-10 RX ORDER — HYDROMORPHONE HYDROCHLORIDE 2 MG/ML
0.25 INJECTION INTRAMUSCULAR; INTRAVENOUS; SUBCUTANEOUS
Refills: 0 | Status: DISCONTINUED | OUTPATIENT
Start: 2022-03-10 | End: 2022-03-10

## 2022-03-10 RX ORDER — CEFAZOLIN SODIUM 1 G
2000 VIAL (EA) INJECTION EVERY 8 HOURS
Refills: 0 | Status: COMPLETED | OUTPATIENT
Start: 2022-03-10 | End: 2022-03-11

## 2022-03-10 RX ORDER — OXYCODONE HYDROCHLORIDE 5 MG/1
5 TABLET ORAL EVERY 4 HOURS
Refills: 0 | Status: DISCONTINUED | OUTPATIENT
Start: 2022-03-10 | End: 2022-03-10

## 2022-03-10 RX ORDER — MAGNESIUM HYDROXIDE 400 MG/1
30 TABLET, CHEWABLE ORAL DAILY
Refills: 0 | Status: DISCONTINUED | OUTPATIENT
Start: 2022-03-10 | End: 2022-03-10

## 2022-03-10 RX ORDER — MORPHINE SULFATE 50 MG/1
2 CAPSULE, EXTENDED RELEASE ORAL ONCE
Refills: 0 | Status: DISCONTINUED | OUTPATIENT
Start: 2022-03-10 | End: 2022-03-10

## 2022-03-10 RX ORDER — SENNA PLUS 8.6 MG/1
2 TABLET ORAL AT BEDTIME
Refills: 0 | Status: DISCONTINUED | OUTPATIENT
Start: 2022-03-10 | End: 2022-03-10

## 2022-03-10 RX ORDER — ONDANSETRON 8 MG/1
4 TABLET, FILM COATED ORAL ONCE
Refills: 0 | Status: DISCONTINUED | OUTPATIENT
Start: 2022-03-10 | End: 2022-03-10

## 2022-03-10 RX ORDER — OXYCODONE HYDROCHLORIDE 5 MG/1
2.5 TABLET ORAL EVERY 4 HOURS
Refills: 0 | Status: DISCONTINUED | OUTPATIENT
Start: 2022-03-10 | End: 2022-03-10

## 2022-03-10 RX ORDER — ACETAMINOPHEN 500 MG
975 TABLET ORAL EVERY 8 HOURS
Refills: 0 | Status: DISCONTINUED | OUTPATIENT
Start: 2022-03-10 | End: 2022-03-10

## 2022-03-10 RX ORDER — ENOXAPARIN SODIUM 100 MG/ML
40 INJECTION SUBCUTANEOUS EVERY 24 HOURS
Refills: 0 | Status: DISCONTINUED | OUTPATIENT
Start: 2022-03-11 | End: 2022-03-11

## 2022-03-10 RX ORDER — SODIUM CHLORIDE 9 MG/ML
1000 INJECTION INTRAMUSCULAR; INTRAVENOUS; SUBCUTANEOUS
Refills: 0 | Status: DISCONTINUED | OUTPATIENT
Start: 2022-03-10 | End: 2022-03-10

## 2022-03-10 RX ADMIN — MORPHINE SULFATE 2 MILLIGRAM(S): 50 CAPSULE, EXTENDED RELEASE ORAL at 04:31

## 2022-03-10 RX ADMIN — MORPHINE SULFATE 2 MILLIGRAM(S): 50 CAPSULE, EXTENDED RELEASE ORAL at 07:21

## 2022-03-10 RX ADMIN — Medication 100 MILLIGRAM(S): at 21:11

## 2022-03-10 RX ADMIN — SODIUM CHLORIDE 125 MILLILITER(S): 9 INJECTION INTRAMUSCULAR; INTRAVENOUS; SUBCUTANEOUS at 07:51

## 2022-03-10 RX ADMIN — MORPHINE SULFATE 2 MILLIGRAM(S): 50 CAPSULE, EXTENDED RELEASE ORAL at 04:54

## 2022-03-10 NOTE — PROCEDURE NOTE - ADDITIONAL PROCEDURE DETAILS
Indication for interrogation: Syncope  Presenting rhythm: SR 70s  Normal ILR function  Stored data revealed no new events    Charbel Beltrán PA-C

## 2022-03-10 NOTE — PHYSICAL THERAPY INITIAL EVALUATION ADULT - ADDITIONAL COMMENTS
as per pt: PTA pt was living in in [facility] + elevator to living floor. and was independent in all functional mobility and ADL's. no AD for gait.

## 2022-03-10 NOTE — CONSULT NOTE ADULT - SUBJECTIVE AND OBJECTIVE BOX
Patient is a 88y old  Female who presents with a chief complaint of ankle fracture (10 Mar 2022 06:16)      HPI:  Orthopedic Surgery Consult Note    88yFemale p/w R ankle pain/deformity and inability to bear weight s/p syncopal fall this morning in her home. Denies headstrike/LOC. Denies numbness/tingling in the feet/toes. No other bone or joint complaints.                           12.1   12.32 )-----------( 159      ( 10 Mar 2022 02:17 )             36.3     10 Mar 2022 02:17    139    |  102    |  27     ----------------------------<  130    3.6     |  25     |  0.88     Ca    9.0        10 Mar 2022 02:17  Phos  2.6       10 Mar 2022 02:17  Mg     1.8       10 Mar 2022 02:17    TPro  6.6    /  Alb  4.3    /  TBili  0.2    /  DBili  x      /  AST  26     /  ALT  18     /  AlkPhos  90     10 Mar 2022 02:17    PT/INR - ( 10 Mar 2022 05:41 )   PT: 11.7 sec;   INR: 1.01 ratio         PTT - ( 10 Mar 2022 05:41 )  PTT:24.7 sec    Vital Signs Last 24 Hrs  T(C): 36.7 (03-10-22 @ 05:15), Max: 36.7 (03-10-22 @ 05:15)  T(F): 98.1 (03-10-22 @ 05:15), Max: 98.1 (03-10-22 @ 05:15)  HR: 88 (03-10-22 @ 05:15) (64 - 88)  BP: 167/79 (03-10-22 @ 05:15) (160/59 - 171/70)  BP(mean): 103 (03-10-22 @ 05:15) (86 - 106)  RR: 16 (03-10-22 @ 05:15) (14 - 22)  SpO2: 99% (03-10-22 @ 05:15) (96% - 100%)    Physical Exam  Gen: Nad  RLE: Skin intact, +skin tenting medially +TTP medial/lateral malleolus  motor intact distally  SILT s/s/sp/dp/t  2+ DP  Compartments soft and compressible    Imaging:  XR showing R trimal ankle fracture    Procedure: Closed reduction performed followed by placement of a well padded trilam splint. Patient tolerated the procedure well. Post procedure imaging obtained and showed improved alignment. Post procedure the patient was NV intact.    A/P: 88yFemale with R ankle fracture s/p closed reduction and splinting  - Pain control  - NWB on affected extremity in splint  - Keep splint clean, dry and intact  - Ice/elevation  - NPO  - FU preop labs  - Documented medical clearance   - Dispo OR today with Dr. Parra for R ankle ORIF     (10 Mar 2022 06:16)      PAST MEDICAL & SURGICAL HISTORY:  HTN (hypertension)        FAMILY HISTORY:      SOCIAL HISTORY:    Allergies    No Known Allergies    Intolerances          REVIEW OF SYSTEMS:  General: no weakness, no fever/chills, no weight loss/gain  Skin/Breast: no rash, no jaundice  Ophthalmologic: no vision changes, no dry eyes   Respiratory and Thorax: no cough, no wheezing, no hemoptysis, no dyspnea  Cardiovascular: no chest pain, no shortness of breath, no orthopnea  Gastrointestinal: no n/v/d, no abdominal pain, no dysphagia   Genitourinary: no dysuria, no frequency, no nocturia, no hematuria  Musculoskeletal: no trauma, no sprain/strain, no myalgias, no arthralgias, no fracture  Neurological: no HA, no dizziness, no weakness, no numbness  Psychiatric: no depression, no SI/HI  Hematology/Lymphatics: no easy bruising  Endocrine: no heat or cold intolerance. no weight gain or loss  Allergic/Immunologic: no allergy or recent reaction     SUBJECTIVE / OVERNIGHT EVENTS:    patient seen and examined in ED  does not remember falling  pain controlled at this time    Vital Signs Last 24 Hrs  T(C): 36.6 (10 Mar 2022 08:53), Max: 36.7 (10 Mar 2022 05:15)  T(F): 97.9 (10 Mar 2022 08:53), Max: 98.1 (10 Mar 2022 05:15)  HR: 83 (10 Mar 2022 08:53) (64 - 88)  BP: 164/75 (10 Mar 2022 08:53) (160/59 - 171/70)  BP(mean): 103 (10 Mar 2022 05:15) (86 - 106)  RR: 18 (10 Mar 2022 08:53) (14 - 22)  SpO2: 99% (10 Mar 2022 08:53) (96% - 100%)  I&O's Summary      PHYSICAL EXAM:  GENERAL: NAD, Comfortable  HEAD:  Atraumatic, Normocephalic  EYES: EOMI, PERRLA, conjunctiva and sclera clear  NECK: Supple, No JVD  CHEST/LUNG: Clear to auscultation bilaterally; No wheeze  HEART: Regular rate and rhythm; No murmurs, rubs, or gallops  ABDOMEN: Soft, Nontender, Nondistended; Bowel sounds present  NEURO: AAOx3, no focal deficit, 5/5 b/l extremities  EXTREMITIES:  2+ Peripheral Pulses, No clubbing, cyanosis, or edema, right ankle elevated and wrapped in ACE  SKIN: No rashes or lesions    LABS:                        12.1   12.32 )-----------( 159      ( 10 Mar 2022 02:17 )             36.3     03-10    139  |  102  |  27<H>  ----------------------------<  130<H>  3.6   |  25  |  0.88    Ca    9.0      10 Mar 2022 02:17  Phos  2.6     03-10  Mg     1.8     03-10    TPro  6.6  /  Alb  4.3  /  TBili  0.2  /  DBili  x   /  AST  26  /  ALT  18  /  AlkPhos  90  03-10    PT/INR - ( 10 Mar 2022 05:41 )   PT: 11.7 sec;   INR: 1.01 ratio         PTT - ( 10 Mar 2022 05:41 )  PTT:24.7 sec  CAPILLARY BLOOD GLUCOSE                RADIOLOGY & ADDITIONAL TESTS:    < from: Xray Ankle Complete 3 Views, Right (03.10.22 @ 02:39) >  Posterior dislocation of the tibiotalar joint accompanied by trimalleolar   fracture.    < end of copied text >  < from: Xray Chest 1 View AP/PA (03.10.22 @ 02:49) >  Clear lungs.    < end of copied text >  < from: CT Cervical Spine No Cont (03.10.22 @ 02:50) >  CT Head: No acute intracranial hemorrhage or calvarial fracture.    CT cervical spine: No acute cervical spine fracture or evidence of   traumatic malalignment.    < end of copied text >  < from: Xray Ankle Post Reduction, Right (03.10.22 @ 05:08) >  Near-anatomic alignment of fracture fragments status post reduction and   casting of trimalleolar fracture.    Interval reduction of previously demonstrated tibiotalardislocation.    Overlying casting material limits evaluation of soft tissue and fine   osseous detail.    < end of copied text >      Imaging Personally Reviewed:  [x] YES  [ ] NO    Consultant(s) Notes Reviewed:  [x] YES  [ ] NO      MEDICATIONS  (STANDING):  acetaminophen     Tablet .. 975 milliGRAM(s) Oral every 8 hours  senna 2 Tablet(s) Oral at bedtime  sodium chloride 0.9%. 1000 milliLiter(s) (125 mL/Hr) IV Continuous <Continuous>    MEDICATIONS  (PRN):  magnesium hydroxide Suspension 30 milliLiter(s) Oral daily PRN Constipation  oxyCODONE    IR 2.5 milliGRAM(s) Oral every 4 hours PRN Moderate Pain (4 - 6)  oxyCODONE    IR 5 milliGRAM(s) Oral every 4 hours PRN Severe Pain (7 - 10)      Care Discussed with Consultants/Other Providers [x] YES  [ ] NO

## 2022-03-10 NOTE — ED PROVIDER NOTE - ATTENDING CONTRIBUTION TO CARE
MD Shahid:  patient seen and evaluated personally.   I agree with the History & Physical,  Impression & Plan other than what was detailed in my note.  MD Shahid  89yo female pt PMHx a fib s/p loop recorder, HTN, HLD who presents to ED s/p fall. Pt does not remember incident so feels that she probably syncpyzed. She woke up on floor and noted that her ankle was painful and swollen on the r side. Does not have headache, n/v, f/c, bv, cp, sob, abd pain, afebrile vitals stable  non toxic well appearing, NC/AT,  conjunctiva non conjected, sclera anicteric, moist mucous membranes, neck supple, heart sounds, normal, no mrg, lungs cta b/l no wrr, abd soft non distended w/ no tenderness, pos deformity to r ankle, nv intact. plan for syncope workup cbc, cmp, trop, cxr, x ray of ankle w/ suspicion for frx.

## 2022-03-10 NOTE — ED ADULT NURSE REASSESSMENT NOTE - NS ED NURSE REASSESS COMMENT FT1
Ortho MD at bedside for setting of R. Ankle. Pt tolerated procedure well. Confirmatory XRay demonstrated improved placement. Pt reports current pain level of 0/10 after procedure. R. foot elevated, pt to OR later today for R. ankle fixation. Pt made aware of plan. Pt resting comfortably in stretcher with stretcher locked and lowered and call bell within reach.

## 2022-03-10 NOTE — ED PROVIDER NOTE - OBJECTIVE STATEMENT
87yo female pt PMHx a fib s/p loop recorder, HTN, HLD who presents to ED s/p syncope and fall after midnight. Patient refers walking when she began to feel lightheaded and then remember waking up in the floor. Patient believes she may have tripped over something because she is experiencing right ankle pain and edema. Refers unable to bear weight over limb. Denies chest pain, SOB, head trauma, n/v/d, abdominal pain, etc. History of frequent syncope episodes in the past year. She was found to have a fib and a loop recorder was placed 6 months ago. Refers improvement of syncopes after.

## 2022-03-10 NOTE — PRE-ANESTHESIA EVALUATION ADULT - HEART RATE (BEATS/MIN)
[facility]    GEOVANNI CAMEJO MD        83

## 2022-03-10 NOTE — PRE-ANESTHESIA EVALUATION ADULT - NSANTHPMHFT_GEN_ALL_CORE
chart and med note reviewed. hx ?pafib? loop recorder interrogation unremarkable. was previously on AC, not recently. hx syncope with + orthostatics, bp meds adjusted. admitted with near-syncope vs mechanical fall. excellent prior et > 2 flights, denies cp/sob. no further w/u or intervention per med.

## 2022-03-10 NOTE — CONSULT NOTE ADULT - ASSESSMENT
Patient is a 88 year old female with PMHx of Afib s/p Loop Recorder, HLD and HTN. Presents to [facility] s/p syncope and fall. Patient found to have right trimal ankle fracture. Consult called for pre-op medical clearance and co medical mgmt.     Right ankle fracture 2/2 Syncope and Fall:  Admitted to surgery  s/p closed reduction and splinting  Pain control  NWB on affected extremity in splint  Keep splint clean, dry and intact  Ice/elevation  NPO  OR today with Dr. Parra for R ankle ORIF  Syncope w/u  Care per surgery    Afib:  s/p Loop Recorder    HLD/HTN:  No active chest pain  Continue home meds once verified    DVT ppx:  IPCs    [facility]

## 2022-03-10 NOTE — H&P ADULT - HISTORY OF PRESENT ILLNESS
Orthopedic Surgery Consult Note    88yFemale p/w R ankle pain/deformity and inability to bear weight s/p syncopal fall this morning in her home. Denies headstrike/LOC. Denies numbness/tingling in the feet/toes. No other bone or joint complaints.                           12.1   12.32 )-----------( 159      ( 10 Mar 2022 02:17 )             36.3     10 Mar 2022 02:17    139    |  102    |  27     ----------------------------<  130    3.6     |  25     |  0.88     Ca    9.0        10 Mar 2022 02:17  Phos  2.6       10 Mar 2022 02:17  Mg     1.8       10 Mar 2022 02:17    TPro  6.6    /  Alb  4.3    /  TBili  0.2    /  DBili  x      /  AST  26     /  ALT  18     /  AlkPhos  90     10 Mar 2022 02:17    PT/INR - ( 10 Mar 2022 05:41 )   PT: 11.7 sec;   INR: 1.01 ratio         PTT - ( 10 Mar 2022 05:41 )  PTT:24.7 sec    Vital Signs Last 24 Hrs  T(C): 36.7 (03-10-22 @ 05:15), Max: 36.7 (03-10-22 @ 05:15)  T(F): 98.1 (03-10-22 @ 05:15), Max: 98.1 (03-10-22 @ 05:15)  HR: 88 (03-10-22 @ 05:15) (64 - 88)  BP: 167/79 (03-10-22 @ 05:15) (160/59 - 171/70)  BP(mean): 103 (03-10-22 @ 05:15) (86 - 106)  RR: 16 (03-10-22 @ 05:15) (14 - 22)  SpO2: 99% (03-10-22 @ 05:15) (96% - 100%)    Physical Exam  Gen: Nad  RLE: Skin intact, +skin tenting medially +TTP medial/lateral malleolus  motor intact distally  SILT s/s/sp/dp/t  2+ DP  Compartments soft and compressible    Imaging:  XR showing R trimal ankle fracture    Procedure: Closed reduction performed followed by placement of a well padded trilam splint. Patient tolerated the procedure well. Post procedure imaging obtained and showed improved alignment. Post procedure the patient was NV intact.    A/P: 88yFemale with R ankle fracture s/p closed reduction and splinting  - Pain control  - NWB on affected extremity in splint  - Keep splint clean, dry and intact  - Ice/elevation  - NPO  - FU preop labs  - Documented medical clearance   - Dispo OR today with Dr. Parra for R ankle ORIF

## 2022-03-10 NOTE — ED ADULT NURSE NOTE - HIV OFFER
Received VM from Sierra Walls regarding her mothers admission into our facility [phone]. She lives in [place] her mother has already discharged home and Sierra is not on the emergency contact list. Call not returned by this writer due to privacy issues.     Kaitlyn Cordero CM     Opt out

## 2022-03-10 NOTE — PHYSICAL THERAPY INITIAL EVALUATION ADULT - LEVEL OF INDEPENDENCE: STAND/SIT, REHAB EVAL
minimum assist (75% patients effort) Patient baseline mental status/Alert and oriented to person, place and time

## 2022-03-10 NOTE — PHYSICAL THERAPY INITIAL EVALUATION ADULT - RANGE OF MOTION EXAMINATION, REHAB EVAL
RLE in splint not tested/bilateral upper extremity ROM was WFL (within functional limits)/Left LE ROM was WFL (within functional limits)

## 2022-03-10 NOTE — PHYSICAL THERAPY INITIAL EVALUATION ADULT - ACTIVE RANGE OF MOTION EXAMINATION, REHAB EVAL
RLE in splint not tested/bilateral upper extremity Active ROM was WFL (within functional limits)/Left LE Active ROM was WFL (within functional limits)

## 2022-03-10 NOTE — ED PROVIDER NOTE - PHYSICAL EXAMINATION
GENERAL: Awake, alert, NAD  HEENT: NC/AT, moist mucous membranes  LUNGS: CTAB, no wheezes or crackles   CARDIAC: RRR, no m/r/g  ABDOMEN: Soft, non tender, non distended   BACK: No midline spinal tenderness   EXT: R ankle edema. medial malleolus tenderness tp. unable to bear weight over foot. PT pulses intact.   NEURO: A&Ox3. sensations over right lower extremity intact  SKIN: Warm and dry. No rash.

## 2022-03-10 NOTE — CHART NOTE - NSCHARTNOTEFT_GEN_A_CORE
Patient seen in PACU resting without complaints.  No Chest Pain, SOB, N/V.    T(C): 36.5 (03-10-22 @ 15:00), Max: 37 (03-10-22 @ 11:56)  HR: 72 (03-10-22 @ 17:00) (64 - 88)  BP: 156/61 (03-10-22 @ 17:00) (155/76 - 183/82)  RR: 18 (03-10-22 @ 17:00) (14 - 22)  SpO2: 99% (03-10-22 @ 17:00) (96% - 100%)  Wt(kg): --    Exam:  Alert and Oriented, No Acute Distress  Laterality: RLE in trilam, C/D/I  Calves soft, non-tender bilaterally  Wiggling toes, sensation intact to the knee  + DP pulse    Xray:----                          13.0   12.69 )-----------( 131      ( 10 Mar 2022 15:40 )             40.5    03-10    141  |  106  |  15  ----------------------------<  125<H>  4.9   |  25  |  0.73    Ca    9.2      10 Mar 2022 15:40  Phos  2.6     03-10  Mg     1.8     03-10    TPro  6.6  /  Alb  4.3  /  TBili  0.2  /  DBili  x   /  AST  26  /  ALT  18  /  AlkPhos  90  03-10      A/P: 88yFemale S/p R ankle ORIF . VSS. NAD  -PT/OT-NWB RLE in trilam splint, OOB when tolerated  -IS encouraged  -DVT PPx with lovenox  -Followup AM labs  -Pain Control  -PACU to floor    Flaquita Franklin PA-C  Team Pager #[phone]

## 2022-03-10 NOTE — ED PROVIDER NOTE - PROGRESS NOTE DETAILS
Ortho consulted for fracture. Ortho bedside. Preop labs ordered. Attending Kleber:  spoke w/ ortho, will admit to their service, will get ct performed before admission

## 2022-03-10 NOTE — PHYSICAL THERAPY INITIAL EVALUATION ADULT - PERTINENT HX OF CURRENT PROBLEM, REHAB EVAL
88yFemale p/w R ankle pain/deformity and inability to bear weight s/p syncopal fall this morning in her home.

## 2022-03-10 NOTE — PATIENT PROFILE ADULT - FALL HARM RISK - HARM RISK INTERVENTIONS

## 2022-03-10 NOTE — ED PROVIDER NOTE - CLINICAL SUMMARY MEDICAL DECISION MAKING FREE TEXT BOX
87yo female pt who presents s/p syncope/fall. found with R ankle edema and tenderness. Will assess with imaging for fracture rule out. Will do syncope work up. Will reassess

## 2022-03-10 NOTE — ED ADULT NURSE NOTE - OBJECTIVE STATEMENT
88 y.o. F A&Ox4 PMHx of HTN and afib with holter monitor placed 6 mos ago presenting to ED from [facility] via EMS s/p fall. Pt states that she was at home when she felt dizzy and tripped over her shoes. Pt states she woke up on the floor with pain in her right ankle. Pt states that she has 5/10 r. ankle pain exacerbated by movement, but denies current lightheadedness, dizziness, CP, SOB, abd pain. Upon assessment, pt has no deformities or signs of trauma to the head/neck, is grossly neurologically intact, no increased work of breathing, with obvious swelling and tenderness to the right ankle. ED Resident Moira at bedside for eval. Pt placed on cardiac monitor and is noted to have normal sinus rhythm. Pt resting comfortably in stretcher at this time. Stretcher locked and lowered and call bell within reach.

## 2022-03-10 NOTE — ED PROVIDER NOTE - NS ED ROS FT
CONST: no fevers, no chills.   EYES: no pain, no vision changes  ENT: no sore throat, no ear pain, no change in hearing  CV: no chest pain, no leg swelling  RESP: no shortness of breath, no cough  ABD: no abdominal pain, no nausea, no vomiting, no diarrhea  : no dysuria, no flank pain, no hematuria  MSK: no back pain. + R ankle pain and edema  NEURO: no headache. + lightheaded, syncope  SKIN:  no rash. + edema

## 2022-03-11 ENCOUNTER — TRANSCRIPTION ENCOUNTER (OUTPATIENT)
Age: 87
End: 2022-03-11

## 2022-03-11 VITALS
DIASTOLIC BLOOD PRESSURE: 66 MMHG | RESPIRATION RATE: 18 BRPM | SYSTOLIC BLOOD PRESSURE: 132 MMHG | HEART RATE: 63 BPM | TEMPERATURE: 98 F | OXYGEN SATURATION: 99 %

## 2022-03-11 LAB
ANION GAP SERPL CALC-SCNC: 10 MMOL/L — SIGNIFICANT CHANGE UP (ref 5–17)
BUN SERPL-MCNC: 13 MG/DL — SIGNIFICANT CHANGE UP (ref 7–23)
CALCIUM SERPL-MCNC: 8.9 MG/DL — SIGNIFICANT CHANGE UP (ref 8.4–10.5)
CHLORIDE SERPL-SCNC: 104 MMOL/L — SIGNIFICANT CHANGE UP (ref 96–108)
CO2 SERPL-SCNC: 28 MMOL/L — SIGNIFICANT CHANGE UP (ref 22–31)
CREAT SERPL-MCNC: 0.78 MG/DL — SIGNIFICANT CHANGE UP (ref 0.5–1.3)
EGFR: 73 ML/MIN/1.73M2 — SIGNIFICANT CHANGE UP
GLUCOSE SERPL-MCNC: 112 MG/DL — HIGH (ref 70–99)
HCT VFR BLD CALC: 36.2 % — SIGNIFICANT CHANGE UP (ref 34.5–45)
HGB BLD-MCNC: 11.8 G/DL — SIGNIFICANT CHANGE UP (ref 11.5–15.5)
MCHC RBC-ENTMCNC: 32.4 PG — SIGNIFICANT CHANGE UP (ref 27–34)
MCHC RBC-ENTMCNC: 32.6 GM/DL — SIGNIFICANT CHANGE UP (ref 32–36)
MCV RBC AUTO: 99.5 FL — SIGNIFICANT CHANGE UP (ref 80–100)
NRBC # BLD: 0 /100 WBCS — SIGNIFICANT CHANGE UP (ref 0–0)
PLATELET # BLD AUTO: 149 K/UL — LOW (ref 150–400)
POTASSIUM SERPL-MCNC: 4.2 MMOL/L — SIGNIFICANT CHANGE UP (ref 3.5–5.3)
POTASSIUM SERPL-SCNC: 4.2 MMOL/L — SIGNIFICANT CHANGE UP (ref 3.5–5.3)
RBC # BLD: 3.64 M/UL — LOW (ref 3.8–5.2)
RBC # FLD: 12.4 % — SIGNIFICANT CHANGE UP (ref 10.3–14.5)
SODIUM SERPL-SCNC: 142 MMOL/L — SIGNIFICANT CHANGE UP (ref 135–145)
WBC # BLD: 12.91 K/UL — HIGH (ref 3.8–10.5)
WBC # FLD AUTO: 12.91 K/UL — HIGH (ref 3.8–10.5)

## 2022-03-11 PROCEDURE — 97161 PT EVAL LOW COMPLEX 20 MIN: CPT

## 2022-03-11 PROCEDURE — 76000 FLUOROSCOPY <1 HR PHYS/QHP: CPT

## 2022-03-11 PROCEDURE — 86901 BLOOD TYPING SEROLOGIC RH(D): CPT

## 2022-03-11 PROCEDURE — 86900 BLOOD TYPING SEROLOGIC ABO: CPT

## 2022-03-11 PROCEDURE — 84100 ASSAY OF PHOSPHORUS: CPT

## 2022-03-11 PROCEDURE — 80048 BASIC METABOLIC PNL TOTAL CA: CPT

## 2022-03-11 PROCEDURE — 80053 COMPREHEN METABOLIC PANEL: CPT

## 2022-03-11 PROCEDURE — 97110 THERAPEUTIC EXERCISES: CPT

## 2022-03-11 PROCEDURE — 73590 X-RAY EXAM OF LOWER LEG: CPT

## 2022-03-11 PROCEDURE — 97165 OT EVAL LOW COMPLEX 30 MIN: CPT

## 2022-03-11 PROCEDURE — 85730 THROMBOPLASTIN TIME PARTIAL: CPT

## 2022-03-11 PROCEDURE — C1713: CPT

## 2022-03-11 PROCEDURE — 76377 3D RENDER W/INTRP POSTPROCES: CPT

## 2022-03-11 PROCEDURE — 99285 EMERGENCY DEPT VISIT HI MDM: CPT | Mod: 25

## 2022-03-11 PROCEDURE — 36415 COLL VENOUS BLD VENIPUNCTURE: CPT

## 2022-03-11 PROCEDURE — 70450 CT HEAD/BRAIN W/O DYE: CPT | Mod: MA

## 2022-03-11 PROCEDURE — 85027 COMPLETE CBC AUTOMATED: CPT

## 2022-03-11 PROCEDURE — 83735 ASSAY OF MAGNESIUM: CPT

## 2022-03-11 PROCEDURE — 85610 PROTHROMBIN TIME: CPT

## 2022-03-11 PROCEDURE — 73630 X-RAY EXAM OF FOOT: CPT

## 2022-03-11 PROCEDURE — 85025 COMPLETE CBC W/AUTO DIFF WBC: CPT

## 2022-03-11 PROCEDURE — 73610 X-RAY EXAM OF ANKLE: CPT

## 2022-03-11 PROCEDURE — 97116 GAIT TRAINING THERAPY: CPT

## 2022-03-11 PROCEDURE — 96374 THER/PROPH/DIAG INJ IV PUSH: CPT

## 2022-03-11 PROCEDURE — 71045 X-RAY EXAM CHEST 1 VIEW: CPT

## 2022-03-11 PROCEDURE — 84484 ASSAY OF TROPONIN QUANT: CPT

## 2022-03-11 PROCEDURE — 72125 CT NECK SPINE W/O DYE: CPT | Mod: MA

## 2022-03-11 PROCEDURE — 73700 CT LOWER EXTREMITY W/O DYE: CPT | Mod: MA

## 2022-03-11 PROCEDURE — C1889: CPT

## 2022-03-11 PROCEDURE — 73600 X-RAY EXAM OF ANKLE: CPT

## 2022-03-11 PROCEDURE — 87635 SARS-COV-2 COVID-19 AMP PRB: CPT

## 2022-03-11 PROCEDURE — 86850 RBC ANTIBODY SCREEN: CPT

## 2022-03-11 RX ORDER — POLYETHYLENE GLYCOL 3350 17 G/17G
17 POWDER, FOR SOLUTION ORAL
Qty: 0 | Refills: 0 | DISCHARGE
Start: 2022-03-11

## 2022-03-11 RX ORDER — SENNA PLUS 8.6 MG/1
2 TABLET ORAL
Qty: 0 | Refills: 0 | DISCHARGE
Start: 2022-03-11

## 2022-03-11 RX ORDER — ASPIRIN/CALCIUM CARB/MAGNESIUM 324 MG
81 TABLET ORAL DAILY
Refills: 0 | Status: DISCONTINUED | OUTPATIENT
Start: 2022-03-11 | End: 2022-03-11

## 2022-03-11 RX ORDER — ATORVASTATIN CALCIUM 80 MG/1
40 TABLET, FILM COATED ORAL AT BEDTIME
Refills: 0 | Status: DISCONTINUED | OUTPATIENT
Start: 2022-03-11 | End: 2022-03-11

## 2022-03-11 RX ORDER — SENNA PLUS 8.6 MG/1
2 TABLET ORAL AT BEDTIME
Refills: 0 | Status: DISCONTINUED | OUTPATIENT
Start: 2022-03-11 | End: 2022-03-11

## 2022-03-11 RX ORDER — METOPROLOL TARTRATE 50 MG
1 TABLET ORAL
Qty: 0 | Refills: 0 | DISCHARGE
Start: 2022-03-11

## 2022-03-11 RX ORDER — ATORVASTATIN CALCIUM 80 MG/1
1 TABLET, FILM COATED ORAL
Qty: 0 | Refills: 0 | DISCHARGE
Start: 2022-03-11

## 2022-03-11 RX ORDER — POLYETHYLENE GLYCOL 3350 17 G/17G
17 POWDER, FOR SOLUTION ORAL DAILY
Refills: 0 | Status: DISCONTINUED | OUTPATIENT
Start: 2022-03-11 | End: 2022-03-11

## 2022-03-11 RX ORDER — LOSARTAN POTASSIUM 100 MG/1
25 TABLET, FILM COATED ORAL DAILY
Refills: 0 | Status: DISCONTINUED | OUTPATIENT
Start: 2022-03-11 | End: 2022-03-11

## 2022-03-11 RX ORDER — ASPIRIN/CALCIUM CARB/MAGNESIUM 324 MG
1 TABLET ORAL
Qty: 0 | Refills: 0 | DISCHARGE
Start: 2022-03-11

## 2022-03-11 RX ORDER — OXYCODONE HYDROCHLORIDE 5 MG/1
2.5 TABLET ORAL EVERY 6 HOURS
Refills: 0 | Status: DISCONTINUED | OUTPATIENT
Start: 2022-03-11 | End: 2022-03-11

## 2022-03-11 RX ORDER — OXYCODONE HYDROCHLORIDE 5 MG/1
1 TABLET ORAL
Qty: 0 | Refills: 0 | DISCHARGE
Start: 2022-03-11

## 2022-03-11 RX ORDER — ACETAMINOPHEN 500 MG
975 TABLET ORAL EVERY 8 HOURS
Refills: 0 | Status: DISCONTINUED | OUTPATIENT
Start: 2022-03-11 | End: 2022-03-11

## 2022-03-11 RX ORDER — METOPROLOL TARTRATE 50 MG
25 TABLET ORAL DAILY
Refills: 0 | Status: DISCONTINUED | OUTPATIENT
Start: 2022-03-11 | End: 2022-03-11

## 2022-03-11 RX ORDER — LOSARTAN POTASSIUM 100 MG/1
1 TABLET, FILM COATED ORAL
Qty: 0 | Refills: 0 | DISCHARGE
Start: 2022-03-11

## 2022-03-11 RX ORDER — OXYCODONE HYDROCHLORIDE 5 MG/1
5 TABLET ORAL EVERY 6 HOURS
Refills: 0 | Status: DISCONTINUED | OUTPATIENT
Start: 2022-03-11 | End: 2022-03-11

## 2022-03-11 RX ORDER — ENOXAPARIN SODIUM 100 MG/ML
40 INJECTION SUBCUTANEOUS
Qty: 0 | Refills: 0 | DISCHARGE
Start: 2022-03-11

## 2022-03-11 RX ORDER — ACETAMINOPHEN 500 MG
3 TABLET ORAL
Qty: 0 | Refills: 0 | DISCHARGE
Start: 2022-03-11

## 2022-03-11 RX ADMIN — Medication 975 MILLIGRAM(S): at 14:01

## 2022-03-11 RX ADMIN — Medication 100 MILLIGRAM(S): at 05:31

## 2022-03-11 RX ADMIN — LOSARTAN POTASSIUM 25 MILLIGRAM(S): 100 TABLET, FILM COATED ORAL at 06:29

## 2022-03-11 RX ADMIN — Medication 975 MILLIGRAM(S): at 06:29

## 2022-03-11 RX ADMIN — Medication 25 MILLIGRAM(S): at 06:29

## 2022-03-11 RX ADMIN — ENOXAPARIN SODIUM 40 MILLIGRAM(S): 100 INJECTION SUBCUTANEOUS at 05:32

## 2022-03-11 RX ADMIN — Medication 81 MILLIGRAM(S): at 11:26

## 2022-03-11 NOTE — PROGRESS NOTE ADULT - SUBJECTIVE AND OBJECTIVE BOX
Ortho Progress Note    S: Patient seen and examined. No acute events overnight. Pain well controlled with current regimen. Denies lightheadedness/dizziness, CP/SOB. Tolerating diet.       O:  Physical Exam:  Gen: Laying in bed, NAD, alert and oriented.   Resp: Unlabored breathing  Ext: Splint CDI          EHL/FHL intact          + SILT DP/SP/SOLANO/Sa/Tib          Extremity WWP    Vital Signs Last 24 Hrs  T(C): 36.7 (11 Mar 2022 05:23), Max: 37 (10 Mar 2022 11:56)  T(F): 98 (11 Mar 2022 05:23), Max: 98.6 (10 Mar 2022 11:56)  HR: 76 (11 Mar 2022 05:23) (69 - 83)  BP: 133/64 (11 Mar 2022 05:23) (129/53 - 183/82)  BP(mean): 119 (10 Mar 2022 18:00) (100 - 119)  RR: 18 (11 Mar 2022 05:23) (16 - 18)  SpO2: 98% (11 Mar 2022 05:23) (95% - 100%)                          13.0   12.69 )-----------( 131      ( 10 Mar 2022 15:40 )             40.5                         12.1   12.32 )-----------( 159      ( 10 Mar 2022 02:17 )             36.3       03-10    141  |  106  |  15  ----------------------------<  125<H>  4.9   |  25  |  0.73        PT/INR - ( 10 Mar 2022 05:41 )   PT: 11.7 sec;   INR: 1.01 ratio         PTT - ( 10 Mar 2022 05:41 )  PTT:24.7 sec        A/P: 88F POD1 s/p R ankle ORIF, doing well  - Pain control  - NWB RLE in splint  - PT/OT  - DVT ppx Lovenox  - FU AM labs  - Dispo planning
Patient is a 88y old  Female who presents with a chief complaint of ankle fracture (11 Mar 2022 06:28)      INTERVAL HPI/OVERNIGHT EVENTS: noted  pt seen and examined this am   events noted  sp ankle ORIF      Vital Signs Last 24 Hrs  T(C): 36.8 (11 Mar 2022 12:20), Max: 37.1 (11 Mar 2022 08:53)  T(F): 98.3 (11 Mar 2022 12:20), Max: 98.8 (11 Mar 2022 08:53)  HR: 63 (11 Mar 2022 12:20) (63 - 81)  BP: 132/66 (11 Mar 2022 12:20) (129/53 - 183/82)  BP(mean): 119 (10 Mar 2022 18:00) (100 - 119)  RR: 18 (11 Mar 2022 12:20) (16 - 18)  SpO2: 99% (11 Mar 2022 12:20) (95% - 100%)    acetaminophen     Tablet .. 975 milliGRAM(s) Oral every 8 hours  aspirin enteric coated 81 milliGRAM(s) Oral daily  atorvastatin 40 milliGRAM(s) Oral at bedtime  enoxaparin Injectable 40 milliGRAM(s) SubCutaneous every 24 hours  losartan 25 milliGRAM(s) Oral daily  metoprolol succinate ER 25 milliGRAM(s) Oral daily  oxyCODONE    IR 5 milliGRAM(s) Oral every 6 hours PRN  oxyCODONE    IR 2.5 milliGRAM(s) Oral every 6 hours PRN  polyethylene glycol 3350 17 Gram(s) Oral daily PRN  senna 2 Tablet(s) Oral at bedtime      PHYSICAL EXAM:  GENERAL: NAD,   EYES: conjunctiva and sclera clear  ENMT: Moist mucous membranes  NECK: Supple, No JVD, Normal thyroid  CHEST/LUNG: non labored, cta b/l  HEART: Regular rate and rhythm; No murmurs, rubs, or gallops  ABDOMEN: Soft, Nontender, Nondistended; Bowel sounds present  EXTREMITIES:  2+ Peripheral Pulses, No clubbing, cyanosis, or edema  LYMPH: No lymphadenopathy noted  SKIN: No rashes or lesions    Consultant(s) Notes Reviewed:  [x ] YES  [ ] NO  Care Discussed with Consultants/Other Providers [ x] YES  [ ] NO    LABS:                        11.8   12.91 )-----------( 149      ( 11 Mar 2022 06:57 )             36.2     03-11    142  |  104  |  13  ----------------------------<  112<H>  4.2   |  28  |  0.78    Ca    8.9      11 Mar 2022 06:56  Phos  2.6     03-10  Mg     1.8     03-10    TPro  6.6  /  Alb  4.3  /  TBili  0.2  /  DBili  x   /  AST  26  /  ALT  18  /  AlkPhos  90  03-10    PT/INR - ( 10 Mar 2022 05:41 )   PT: 11.7 sec;   INR: 1.01 ratio         PTT - ( 10 Mar 2022 05:41 )  PTT:24.7 sec    CAPILLARY BLOOD GLUCOSE                  RADIOLOGY & ADDITIONAL TESTS:    Imaging Personally Reviewed:  [x ] YES  [ ] NO
ORTHO ATTENDING POST OP    s/p ORIF R ankle  NWB R  LE  AO splint  elevation  lovenox 40 QD  CBC in RR and AM  Ancef 1 g x 24h  f/u medicine  OOB to chair in AM

## 2022-03-11 NOTE — DISCHARGE NOTE NURSING/CASE MANAGEMENT/SOCIAL WORK - NSDCPEFALRISK_GEN_ALL_CORE
For information on Fall & Injury Prevention, visit: [email or web address] OR  [email or web address] OR  https://www.cdc.gov/steadi/patient.html

## 2022-03-11 NOTE — OCCUPATIONAL THERAPY INITIAL EVALUATION ADULT - LIVES WITH, PROFILE
Pt lives alone in apartment, 0 steps to enter, walk in shower with grab bars. Pt I in ADLs and ambulation prior to admission, +driving/alone

## 2022-03-11 NOTE — DISCHARGE NOTE PROVIDER - NSDCCPCAREPLAN_GEN_ALL_CORE_FT
PRINCIPAL DISCHARGE DIAGNOSIS  Diagnosis: Bimalleolar fracture of right ankle  Assessment and Plan of Treatment:       SECONDARY DISCHARGE DIAGNOSES  Diagnosis: Syncope  Assessment and Plan of Treatment:

## 2022-03-11 NOTE — DISCHARGE NOTE PROVIDER - CARE PROVIDER_API CALL
Dewayne Parra (MD)  Orthopaedic Surgery  [address]  [place] [address]  Phone: [phone]  Fax: [phone]  Follow Up Time:

## 2022-03-11 NOTE — DISCHARGE NOTE PROVIDER - NSDCMRMEDTOKEN_GEN_ALL_CORE_FT
acetaminophen 325 mg oral tablet: 3 tab(s) orally every 8 hours  enoxaparin: 40 milligram(s) subcutaneous once a day x 6 weeks total for dvt prevention  losartan 25 mg oral tablet: 1 tab(s) orally once a day  metoprolol succinate 25 mg oral tablet, extended release: 1 tab(s) orally once a day  oxyCODONE 5 mg oral tablet: 0.5-1 tab(s) orally every 6 hours, As needed, moderate to Severe Pain  polyethylene glycol 3350 oral powder for reconstitution: 17 gram(s) orally once a day, As needed, Constipation  senna oral tablet: 2 tab(s) orally once a day (at bedtime)

## 2022-03-11 NOTE — PROGRESS NOTE ADULT - ASSESSMENT
88 year old female with PMHx of Afib s/p Loop Recorder, HLD and HTN. Presents to [facility] s/p syncope and fall. Patient found to have right trimal ankle fracture. s/p closed reduction and splinting  Consult called for pre-op medical clearance   sp R ankle ORIF 3/10  postop care per ortho  pain control  PT      #Syncope  ILR interrogation noted- no events  pt hx of presyncopal episodes 6mon ago, pafib- at that time ILR was placed  of note was found to be orthostatic hypotensive at pcp office few wks ago  home anti htn meds adjusted    #HTN- bp optimal  metoprolol  losartan    Afib:  s/p Loop Recorder  asa    Dvt ppx  incentive spirometry per ortho    [facility]  [phone]

## 2022-03-11 NOTE — DISCHARGE NOTE PROVIDER - NSDCFUADDINST_GEN_ALL_CORE_FT
Please follow up with your Doctor after your discharge from Subacute Rehab (2 weeks, call for appointment).  PT-non weight bearing LLE.  Keep dressing/splint clean, dry and intact.  Please take lovenox daily x 6 weeks total for dvt prevention.  Have doctor remove any staples/sutures postop day 14 (if applicable), and apply steristrips as needed.  Please follow up with your PMD 1 month after your hospital discharge.

## 2022-03-11 NOTE — DISCHARGE NOTE PROVIDER - HOSPITAL COURSE
This is an 88 year old female admitted to [facility] on 3/10/22 after a syncopal fall.  Patient found. to have L ankle fracture.  Patient evaluated and cleared by Medicine for operative procedure.  On 3/10, patient underwent an uncomplicated ORIF of the ankle.  Evaluated and treated by PT, recommended for Subacute Rehab.  Remain of hospital stay unremarkable, and patient discharged to Subacute Rehab when bed available.

## 2022-03-11 NOTE — OCCUPATIONAL THERAPY INITIAL EVALUATION ADULT - PERTINENT HX OF CURRENT PROBLEM, REHAB EVAL
89 yo F p/w R ankle pain/deformity and inability to bear weight s/p syncopal fall this morning in her home. Denies headstrike/LOC. Denies numbness/tingling in the feet/toes. No other bone or joint complaints. Pt s/p ORIF R ankle

## 2022-03-28 NOTE — ED PROVIDER NOTE - PHYSICAL EXAMINATION
Gen: Patient is well-appearing, NAD, AAOx3, able to ambulate without assistance  HEENT: NCAT, EOMI, PEERLA, normal conjunctiva, tongue midline, oral mucosa moist  Lung: CTAB, no respiratory distress, no wheezes/rhonchi/rales B/L, speaking in full sentences  CV: RRR, no murmurs, rubs or gallops, distal pulses 2+ b/l  Abd: soft, NT, ND, no guarding, no rigidity, no rebound tenderness, no CVA tenderness   MSK: RLE is in a cast, ROM normal in UE/LE  Neuro: No focal sensory or motor deficits  Skin: Warm, well perfused, no leg swelling  Psych: normal affect, calm

## 2022-03-28 NOTE — ED PROVIDER NOTE - CLINICAL SUMMARY MEDICAL DECISION MAKING FREE TEXT BOX
88 y F, hx of htn, hld, paroxysmal afib, multiple syncopal episodes in the past, presenting with syncope. VSWNL on presentation. Initial EKG is NSR. Unremarkable PE. Patient NAD, RRR on cardiac exam, clear lungs bilaterally, no abdominal tenderness. Will get basic labs, troponin, ua, cxr, orthostatic and reassess.

## 2022-03-28 NOTE — ED ADULT NURSE NOTE - OBJECTIVE STATEMENT
Female 88 years old with past medical history of HTN, alert and orientedx4  brought in by EMS for syncope. Alert and orientedx4. As per daughter pt was sitting in wheelchair 30 minutes after eating dinner, fel dizzy with mild nausea and passed out for more than a minute. Denies chest pain, sob, vision change or headache. Pt states this happened few times last was March 10, fell after syncope and fractured her right ankle. With right leg immobilizer in place. Denies nausea or vomiting. Comfort/safety maintained, Bed locked in lowest position. daughter at bedside.

## 2022-03-28 NOTE — ED PROVIDER NOTE - OBJECTIVE STATEMENT
88 y F, hx of recurrent episodes of syncope, htn, hld, paroxysmal afib, presenting with acute onset of syncope earlier today. Patient was just recently hospitalized on March 10 after she syncopized and fractured her R ankle. Patient underwent R ankle surgery and since then been ambulating on a wheelchair. At 5 pm today, patient had another syncope episode, where she had waxing and waning of mentation for about two minutes. Daughter was present during the syncopal episode. No tongue biting. No postictal episode. No urinary incontinence reported. Patient now asymptomatic. Denies presence of any symptoms including fever, headache, chest pain, shortness of breath, abdominal pain, urinary symptoms.

## 2022-03-28 NOTE — ED PROVIDER NOTE - PATIENT PORTAL LINK FT
You can access the FollowMyHealth Patient Portal offered by [facility] by registering at the following website: [email or web address]. By joining [employer]’s FollowMyHealth portal, you will also be able to view your health information using other applications (apps) compatible with our system.

## 2022-03-28 NOTE — ED PROVIDER NOTE - NS ED ROS FT
Constitutional:  (-) fever, (-) chills, (-) lethargy  Eyes:  (-) eye pain (-) visual changes  ENMT: (-) nasal discharge, (-) sore throat. (-) neck pain or stiffness  Cardiac: (-) chest pain (-) palpitations  Respiratory:  (-) cough (-) respiratory distress.   GI:  (-) nausea (-) vomiting (-) diarrhea (-) abdominal pain.  :  (-) dysuria (-) frequency (-) burning.  MS:  (-) back pain (-) joint pain.  Neuro:  (+) syncope (-) headache (-) numbness (-) tingling (-) focal weakness  Skin:  (-) rash  Except as documented in the HPI,  all other systems are negative

## 2022-03-28 NOTE — ED PROVIDER NOTE - PROGRESS NOTE DETAILS
MYESHA Shah. PGY-3: offered admission for syncope w/u, patient is currently being worked up outpatient. had shared decision making with patient, would prefer to continue outpatient w/u. DC with close outpatient f/u, given return precautions

## 2022-03-28 NOTE — ED ADULT NURSE NOTE - NSIMPLEMENTINTERV_GEN_ALL_ED
Implemented All Fall with Harm Risk Interventions:  [place] to call system. Call bell, personal items and telephone within reach. Instruct patient to call for assistance. Room bathroom lighting operational. Non-slip footwear when patient is off stretcher. Physically safe environment: no spills, clutter or unnecessary equipment. Stretcher in lowest position, wheels locked, appropriate side rails in place. Provide visual cue, wrist band, yellow gown, etc. Monitor gait and stability. Monitor for mental status changes and reorient to person, place, and time. Review medications for side effects contributing to fall risk. Reinforce activity limits and safety measures with patient and family. Provide visual clues: red socks.

## 2022-03-28 NOTE — ED ADULT NURSE REASSESSMENT NOTE - NS ED NURSE REASSESS COMMENT FT1
Pt brought to bathroom. Denies any dizziness, lightheadedness. Urine sample obtained. UA and UC drawn and sent. Pending results, pt aware. Will continue to monitor.

## 2022-03-28 NOTE — ED PROVIDER NOTE - ATTENDING CONTRIBUTION TO CARE
89 yo female, recent ankle surgery, recurrent syncope with loop recorder in place, p/w syncope from seated position today.  now feels back to baseline, conversant, no distress.  daughter @ bedside.  will check labs, EKG and reassess; would favor continued outpatient management if no gross life threatening lab/EKG abnormalities.

## 2022-04-15 PROBLEM — S82.851A CLOSED RIGHT TRIMALLEOLAR FRACTURE: Status: ACTIVE | Noted: 2022-01-01

## 2022-09-07 PROBLEM — M76.821 POSTERIOR TIBIAL TENDINITIS OF RIGHT LOWER EXTREMITY: Status: ACTIVE | Noted: 2022-01-01

## 2022-09-09 PROBLEM — S82.851D CLOSED TRIMALLEOLAR FRACTURE OF RIGHT ANKLE WITH ROUTINE HEALING, SUBSEQUENT ENCOUNTER: Status: ACTIVE | Noted: 2022-01-01

## 2023-01-01 ENCOUNTER — NON-APPOINTMENT (OUTPATIENT)
Age: 88
End: 2023-01-01

## 2023-01-01 ENCOUNTER — APPOINTMENT (OUTPATIENT)
Dept: OPHTHALMOLOGY | Facility: CLINIC | Age: 88
End: 2023-01-01
Payer: MEDICARE

## 2023-01-01 ENCOUNTER — TRANSCRIPTION ENCOUNTER (OUTPATIENT)
Age: 88
End: 2023-01-01

## 2023-01-01 ENCOUNTER — INPATIENT (INPATIENT)
Facility: HOSPITAL | Age: 88
LOS: 23 days | DRG: 64 | End: 2023-03-20
Attending: INTERNAL MEDICINE | Admitting: INTERNAL MEDICINE
Payer: MEDICARE

## 2023-01-01 VITALS
OXYGEN SATURATION: 95 % | TEMPERATURE: 98 F | DIASTOLIC BLOOD PRESSURE: 68 MMHG | HEART RATE: 89 BPM | RESPIRATION RATE: 18 BRPM | SYSTOLIC BLOOD PRESSURE: 94 MMHG

## 2023-01-01 VITALS
HEIGHT: 63 IN | DIASTOLIC BLOOD PRESSURE: 89 MMHG | WEIGHT: 121.03 LBS | TEMPERATURE: 98 F | RESPIRATION RATE: 16 BRPM | OXYGEN SATURATION: 99 % | SYSTOLIC BLOOD PRESSURE: 189 MMHG | HEART RATE: 77 BPM

## 2023-01-01 DIAGNOSIS — J90 PLEURAL EFFUSION, NOT ELSEWHERE CLASSIFIED: ICD-10-CM

## 2023-01-01 DIAGNOSIS — I48.20 CHRONIC ATRIAL FIBRILLATION, UNSPECIFIED: ICD-10-CM

## 2023-01-01 DIAGNOSIS — D72.829 ELEVATED WHITE BLOOD CELL COUNT, UNSPECIFIED: ICD-10-CM

## 2023-01-01 DIAGNOSIS — Z98.890 OTHER SPECIFIED POSTPROCEDURAL STATES: Chronic | ICD-10-CM

## 2023-01-01 DIAGNOSIS — R29.810 FACIAL WEAKNESS: ICD-10-CM

## 2023-01-01 DIAGNOSIS — S32.592A OTHER SPECIFIED FRACTURE OF LEFT PUBIS, INITIAL ENCOUNTER FOR CLOSED FRACTURE: ICD-10-CM

## 2023-01-01 DIAGNOSIS — R49.8 OTHER VOICE AND RESONANCE DISORDERS: ICD-10-CM

## 2023-01-01 DIAGNOSIS — Z29.9 ENCOUNTER FOR PROPHYLACTIC MEASURES, UNSPECIFIED: ICD-10-CM

## 2023-01-01 DIAGNOSIS — Q21.12 PATENT FORAMEN OVALE: ICD-10-CM

## 2023-01-01 DIAGNOSIS — I10 ESSENTIAL (PRIMARY) HYPERTENSION: ICD-10-CM

## 2023-01-01 DIAGNOSIS — R06.02 SHORTNESS OF BREATH: ICD-10-CM

## 2023-01-01 DIAGNOSIS — I63.9 CEREBRAL INFARCTION, UNSPECIFIED: ICD-10-CM

## 2023-01-01 DIAGNOSIS — R26.2 DIFFICULTY IN WALKING, NOT ELSEWHERE CLASSIFIED: ICD-10-CM

## 2023-01-01 DIAGNOSIS — S70.00XA CONTUSION OF UNSPECIFIED HIP, INITIAL ENCOUNTER: ICD-10-CM

## 2023-01-01 LAB
A1C WITH ESTIMATED AVERAGE GLUCOSE RESULT: 5.2 % — SIGNIFICANT CHANGE UP (ref 4–5.6)
ALBUMIN SERPL ELPH-MCNC: 2.3 G/DL — LOW (ref 3.3–5)
ALBUMIN SERPL ELPH-MCNC: 3.7 G/DL — SIGNIFICANT CHANGE UP (ref 3.3–5)
ALBUMIN SERPL ELPH-MCNC: 3.9 G/DL — SIGNIFICANT CHANGE UP (ref 3.3–5)
ALBUMIN SERPL ELPH-MCNC: 4.4 G/DL — SIGNIFICANT CHANGE UP (ref 3.3–5)
ALP SERPL-CCNC: 149 U/L — HIGH (ref 40–120)
ALP SERPL-CCNC: 162 U/L — HIGH (ref 40–120)
ALP SERPL-CCNC: 195 U/L — HIGH (ref 40–120)
ALP SERPL-CCNC: 339 U/L — HIGH (ref 40–120)
ALT FLD-CCNC: 29 U/L — SIGNIFICANT CHANGE UP (ref 10–45)
ALT FLD-CCNC: 33 U/L — SIGNIFICANT CHANGE UP (ref 10–45)
ALT FLD-CCNC: 34 U/L — SIGNIFICANT CHANGE UP (ref 10–45)
ALT FLD-CCNC: 35 U/L — SIGNIFICANT CHANGE UP (ref 10–45)
ANION GAP SERPL CALC-SCNC: 10 MMOL/L — SIGNIFICANT CHANGE UP (ref 5–17)
ANION GAP SERPL CALC-SCNC: 11 MMOL/L — SIGNIFICANT CHANGE UP (ref 5–17)
ANION GAP SERPL CALC-SCNC: 13 MMOL/L — SIGNIFICANT CHANGE UP (ref 5–17)
ANION GAP SERPL CALC-SCNC: 15 MMOL/L — SIGNIFICANT CHANGE UP (ref 5–17)
ANION GAP SERPL CALC-SCNC: 8 MMOL/L — SIGNIFICANT CHANGE UP (ref 5–17)
APPEARANCE UR: CLEAR — SIGNIFICANT CHANGE UP
APTT BLD: 32.1 SEC — SIGNIFICANT CHANGE UP (ref 27.5–35.5)
APTT BLD: 35.2 SEC — SIGNIFICANT CHANGE UP (ref 27.5–35.5)
APTT BLD: 37.4 SEC — HIGH (ref 27.5–35.5)
AST SERPL-CCNC: 32 U/L — SIGNIFICANT CHANGE UP (ref 10–40)
AST SERPL-CCNC: 38 U/L — SIGNIFICANT CHANGE UP (ref 10–40)
AST SERPL-CCNC: 50 U/L — HIGH (ref 10–40)
AST SERPL-CCNC: 61 U/L — HIGH (ref 10–40)
BASOPHILS # BLD AUTO: 0.06 K/UL — SIGNIFICANT CHANGE UP (ref 0–0.2)
BASOPHILS NFR BLD AUTO: 0.4 % — SIGNIFICANT CHANGE UP (ref 0–2)
BILIRUB SERPL-MCNC: 0.2 MG/DL — SIGNIFICANT CHANGE UP (ref 0.2–1.2)
BILIRUB SERPL-MCNC: 0.5 MG/DL — SIGNIFICANT CHANGE UP (ref 0.2–1.2)
BILIRUB SERPL-MCNC: 0.6 MG/DL — SIGNIFICANT CHANGE UP (ref 0.2–1.2)
BILIRUB SERPL-MCNC: 0.7 MG/DL — SIGNIFICANT CHANGE UP (ref 0.2–1.2)
BILIRUB UR-MCNC: NEGATIVE — SIGNIFICANT CHANGE UP
BLD GP AB SCN SERPL QL: NEGATIVE — SIGNIFICANT CHANGE UP
BUN SERPL-MCNC: 21 MG/DL — SIGNIFICANT CHANGE UP (ref 7–23)
BUN SERPL-MCNC: 22 MG/DL — SIGNIFICANT CHANGE UP (ref 7–23)
BUN SERPL-MCNC: 24 MG/DL — HIGH (ref 7–23)
BUN SERPL-MCNC: 24 MG/DL — HIGH (ref 7–23)
BUN SERPL-MCNC: 25 MG/DL — HIGH (ref 7–23)
BUN SERPL-MCNC: 25 MG/DL — HIGH (ref 7–23)
BUN SERPL-MCNC: 26 MG/DL — HIGH (ref 7–23)
BUN SERPL-MCNC: 27 MG/DL — HIGH (ref 7–23)
BUN SERPL-MCNC: 29 MG/DL — HIGH (ref 7–23)
BUN SERPL-MCNC: 30 MG/DL — HIGH (ref 7–23)
BUN SERPL-MCNC: 36 MG/DL — HIGH (ref 7–23)
BUN SERPL-MCNC: 38 MG/DL — HIGH (ref 7–23)
BUN SERPL-MCNC: 39 MG/DL — HIGH (ref 7–23)
BUN SERPL-MCNC: 40 MG/DL — HIGH (ref 7–23)
BUN SERPL-MCNC: 43 MG/DL — HIGH (ref 7–23)
CALCIUM SERPL-MCNC: 10 MG/DL — SIGNIFICANT CHANGE UP (ref 8.4–10.5)
CALCIUM SERPL-MCNC: 7 MG/DL — LOW (ref 8.4–10.5)
CALCIUM SERPL-MCNC: 8.1 MG/DL — LOW (ref 8.4–10.5)
CALCIUM SERPL-MCNC: 8.6 MG/DL — SIGNIFICANT CHANGE UP (ref 8.4–10.5)
CALCIUM SERPL-MCNC: 8.7 MG/DL — SIGNIFICANT CHANGE UP (ref 8.4–10.5)
CALCIUM SERPL-MCNC: 8.8 MG/DL — SIGNIFICANT CHANGE UP (ref 8.4–10.5)
CALCIUM SERPL-MCNC: 8.8 MG/DL — SIGNIFICANT CHANGE UP (ref 8.4–10.5)
CALCIUM SERPL-MCNC: 8.9 MG/DL — SIGNIFICANT CHANGE UP (ref 8.4–10.5)
CALCIUM SERPL-MCNC: 9 MG/DL — SIGNIFICANT CHANGE UP (ref 8.4–10.5)
CALCIUM SERPL-MCNC: 9 MG/DL — SIGNIFICANT CHANGE UP (ref 8.4–10.5)
CALCIUM SERPL-MCNC: 9.1 MG/DL — SIGNIFICANT CHANGE UP (ref 8.4–10.5)
CALCIUM SERPL-MCNC: 9.2 MG/DL — SIGNIFICANT CHANGE UP (ref 8.4–10.5)
CALCIUM SERPL-MCNC: 9.3 MG/DL — SIGNIFICANT CHANGE UP (ref 8.4–10.5)
CALCIUM SERPL-MCNC: 9.5 MG/DL — SIGNIFICANT CHANGE UP (ref 8.4–10.5)
CALCIUM SERPL-MCNC: 9.6 MG/DL — SIGNIFICANT CHANGE UP (ref 8.4–10.5)
CHLORIDE SERPL-SCNC: 101 MMOL/L — SIGNIFICANT CHANGE UP (ref 96–108)
CHLORIDE SERPL-SCNC: 101 MMOL/L — SIGNIFICANT CHANGE UP (ref 96–108)
CHLORIDE SERPL-SCNC: 102 MMOL/L — SIGNIFICANT CHANGE UP (ref 96–108)
CHLORIDE SERPL-SCNC: 103 MMOL/L — SIGNIFICANT CHANGE UP (ref 96–108)
CHLORIDE SERPL-SCNC: 104 MMOL/L — SIGNIFICANT CHANGE UP (ref 96–108)
CHLORIDE SERPL-SCNC: 105 MMOL/L — SIGNIFICANT CHANGE UP (ref 96–108)
CHLORIDE SERPL-SCNC: 106 MMOL/L — SIGNIFICANT CHANGE UP (ref 96–108)
CHLORIDE SERPL-SCNC: 107 MMOL/L — SIGNIFICANT CHANGE UP (ref 96–108)
CHLORIDE SERPL-SCNC: 113 MMOL/L — HIGH (ref 96–108)
CHOLEST SERPL-MCNC: 200 MG/DL — HIGH
CO2 SERPL-SCNC: 18 MMOL/L — LOW (ref 22–31)
CO2 SERPL-SCNC: 19 MMOL/L — LOW (ref 22–31)
CO2 SERPL-SCNC: 21 MMOL/L — LOW (ref 22–31)
CO2 SERPL-SCNC: 22 MMOL/L — SIGNIFICANT CHANGE UP (ref 22–31)
CO2 SERPL-SCNC: 23 MMOL/L — SIGNIFICANT CHANGE UP (ref 22–31)
CO2 SERPL-SCNC: 24 MMOL/L — SIGNIFICANT CHANGE UP (ref 22–31)
CO2 SERPL-SCNC: 25 MMOL/L — SIGNIFICANT CHANGE UP (ref 22–31)
CO2 SERPL-SCNC: 26 MMOL/L — SIGNIFICANT CHANGE UP (ref 22–31)
CO2 SERPL-SCNC: 28 MMOL/L — SIGNIFICANT CHANGE UP (ref 22–31)
COLOR SPEC: YELLOW — SIGNIFICANT CHANGE UP
CREAT SERPL-MCNC: 0.62 MG/DL — SIGNIFICANT CHANGE UP (ref 0.5–1.3)
CREAT SERPL-MCNC: 0.71 MG/DL — SIGNIFICANT CHANGE UP (ref 0.5–1.3)
CREAT SERPL-MCNC: 0.73 MG/DL — SIGNIFICANT CHANGE UP (ref 0.5–1.3)
CREAT SERPL-MCNC: 0.75 MG/DL — SIGNIFICANT CHANGE UP (ref 0.5–1.3)
CREAT SERPL-MCNC: 0.77 MG/DL — SIGNIFICANT CHANGE UP (ref 0.5–1.3)
CREAT SERPL-MCNC: 0.81 MG/DL — SIGNIFICANT CHANGE UP (ref 0.5–1.3)
CREAT SERPL-MCNC: 0.83 MG/DL — SIGNIFICANT CHANGE UP (ref 0.5–1.3)
CREAT SERPL-MCNC: 0.87 MG/DL — SIGNIFICANT CHANGE UP (ref 0.5–1.3)
CREAT SERPL-MCNC: 0.87 MG/DL — SIGNIFICANT CHANGE UP (ref 0.5–1.3)
CREAT SERPL-MCNC: 0.88 MG/DL — SIGNIFICANT CHANGE UP (ref 0.5–1.3)
CREAT SERPL-MCNC: 0.89 MG/DL — SIGNIFICANT CHANGE UP (ref 0.5–1.3)
CREAT SERPL-MCNC: 0.89 MG/DL — SIGNIFICANT CHANGE UP (ref 0.5–1.3)
CREAT SERPL-MCNC: 0.9 MG/DL — SIGNIFICANT CHANGE UP (ref 0.5–1.3)
CREAT SERPL-MCNC: 0.9 MG/DL — SIGNIFICANT CHANGE UP (ref 0.5–1.3)
CREAT SERPL-MCNC: 0.91 MG/DL — SIGNIFICANT CHANGE UP (ref 0.5–1.3)
CREAT SERPL-MCNC: 0.93 MG/DL — SIGNIFICANT CHANGE UP (ref 0.5–1.3)
CREAT SERPL-MCNC: 0.94 MG/DL — SIGNIFICANT CHANGE UP (ref 0.5–1.3)
CREAT SERPL-MCNC: 1.05 MG/DL — SIGNIFICANT CHANGE UP (ref 0.5–1.3)
CULTURE RESULTS: SIGNIFICANT CHANGE UP
CULTURE RESULTS: SIGNIFICANT CHANGE UP
DIFF PNL FLD: NEGATIVE — SIGNIFICANT CHANGE UP
EGFR: 51 ML/MIN/1.73M2 — LOW
EGFR: 58 ML/MIN/1.73M2 — LOW
EGFR: 59 ML/MIN/1.73M2 — LOW
EGFR: 60 ML/MIN/1.73M2 — SIGNIFICANT CHANGE UP
EGFR: 61 ML/MIN/1.73M2 — SIGNIFICANT CHANGE UP
EGFR: 61 ML/MIN/1.73M2 — SIGNIFICANT CHANGE UP
EGFR: 62 ML/MIN/1.73M2 — SIGNIFICANT CHANGE UP
EGFR: 62 ML/MIN/1.73M2 — SIGNIFICANT CHANGE UP
EGFR: 63 ML/MIN/1.73M2 — SIGNIFICANT CHANGE UP
EGFR: 64 ML/MIN/1.73M2 — SIGNIFICANT CHANGE UP
EGFR: 64 ML/MIN/1.73M2 — SIGNIFICANT CHANGE UP
EGFR: 67 ML/MIN/1.73M2 — SIGNIFICANT CHANGE UP
EGFR: 69 ML/MIN/1.73M2 — SIGNIFICANT CHANGE UP
EGFR: 74 ML/MIN/1.73M2 — SIGNIFICANT CHANGE UP
EGFR: 76 ML/MIN/1.73M2 — SIGNIFICANT CHANGE UP
EGFR: 79 ML/MIN/1.73M2 — SIGNIFICANT CHANGE UP
EGFR: 81 ML/MIN/1.73M2 — SIGNIFICANT CHANGE UP
EGFR: 85 ML/MIN/1.73M2 — SIGNIFICANT CHANGE UP
EOSINOPHIL # BLD AUTO: 0.04 K/UL — SIGNIFICANT CHANGE UP (ref 0–0.5)
EOSINOPHIL NFR BLD AUTO: 0.3 % — SIGNIFICANT CHANGE UP (ref 0–6)
ESTIMATED AVERAGE GLUCOSE: 103 MG/DL — SIGNIFICANT CHANGE UP (ref 68–114)
FLUAV AG NPH QL: SIGNIFICANT CHANGE UP
FLUBV AG NPH QL: SIGNIFICANT CHANGE UP
GLUCOSE BLDC GLUCOMTR-MCNC: 221 MG/DL — HIGH (ref 70–99)
GLUCOSE SERPL-MCNC: 103 MG/DL — HIGH (ref 70–99)
GLUCOSE SERPL-MCNC: 104 MG/DL — HIGH (ref 70–99)
GLUCOSE SERPL-MCNC: 104 MG/DL — HIGH (ref 70–99)
GLUCOSE SERPL-MCNC: 108 MG/DL — HIGH (ref 70–99)
GLUCOSE SERPL-MCNC: 110 MG/DL — HIGH (ref 70–99)
GLUCOSE SERPL-MCNC: 111 MG/DL — HIGH (ref 70–99)
GLUCOSE SERPL-MCNC: 112 MG/DL — HIGH (ref 70–99)
GLUCOSE SERPL-MCNC: 113 MG/DL — HIGH (ref 70–99)
GLUCOSE SERPL-MCNC: 114 MG/DL — HIGH (ref 70–99)
GLUCOSE SERPL-MCNC: 115 MG/DL — HIGH (ref 70–99)
GLUCOSE SERPL-MCNC: 118 MG/DL — HIGH (ref 70–99)
GLUCOSE SERPL-MCNC: 119 MG/DL — HIGH (ref 70–99)
GLUCOSE SERPL-MCNC: 214 MG/DL — HIGH (ref 70–99)
GLUCOSE SERPL-MCNC: 233 MG/DL — HIGH (ref 70–99)
GLUCOSE SERPL-MCNC: 760 MG/DL — CRITICAL HIGH (ref 70–99)
GLUCOSE SERPL-MCNC: 96 MG/DL — SIGNIFICANT CHANGE UP (ref 70–99)
GLUCOSE UR QL: NEGATIVE — SIGNIFICANT CHANGE UP
HCT VFR BLD CALC: 28.7 % — LOW (ref 34.5–45)
HCT VFR BLD CALC: 29.7 % — LOW (ref 34.5–45)
HCT VFR BLD CALC: 30.1 % — LOW (ref 34.5–45)
HCT VFR BLD CALC: 30.9 % — LOW (ref 34.5–45)
HCT VFR BLD CALC: 30.9 % — LOW (ref 34.5–45)
HCT VFR BLD CALC: 31.3 % — LOW (ref 34.5–45)
HCT VFR BLD CALC: 31.5 % — LOW (ref 34.5–45)
HCT VFR BLD CALC: 32 % — LOW (ref 34.5–45)
HCT VFR BLD CALC: 32.6 % — LOW (ref 34.5–45)
HCT VFR BLD CALC: 32.7 % — LOW (ref 34.5–45)
HCT VFR BLD CALC: 32.7 % — LOW (ref 34.5–45)
HCT VFR BLD CALC: 32.8 % — LOW (ref 34.5–45)
HCT VFR BLD CALC: 32.8 % — LOW (ref 34.5–45)
HCT VFR BLD CALC: 33 % — LOW (ref 34.5–45)
HCT VFR BLD CALC: 34.4 % — LOW (ref 34.5–45)
HCT VFR BLD CALC: 34.5 % — SIGNIFICANT CHANGE UP (ref 34.5–45)
HCT VFR BLD CALC: 34.8 % — SIGNIFICANT CHANGE UP (ref 34.5–45)
HCT VFR BLD CALC: 35.2 % — SIGNIFICANT CHANGE UP (ref 34.5–45)
HCT VFR BLD CALC: 35.3 % — SIGNIFICANT CHANGE UP (ref 34.5–45)
HCT VFR BLD CALC: 35.7 % — SIGNIFICANT CHANGE UP (ref 34.5–45)
HCT VFR BLD CALC: 35.8 % — SIGNIFICANT CHANGE UP (ref 34.5–45)
HCT VFR BLD CALC: 36.6 % — SIGNIFICANT CHANGE UP (ref 34.5–45)
HCT VFR BLD CALC: 40.8 % — SIGNIFICANT CHANGE UP (ref 34.5–45)
HDLC SERPL-MCNC: 69 MG/DL — SIGNIFICANT CHANGE UP
HGB BLD-MCNC: 10 G/DL — LOW (ref 11.5–15.5)
HGB BLD-MCNC: 10 G/DL — LOW (ref 11.5–15.5)
HGB BLD-MCNC: 10.1 G/DL — LOW (ref 11.5–15.5)
HGB BLD-MCNC: 10.3 G/DL — LOW (ref 11.5–15.5)
HGB BLD-MCNC: 10.5 G/DL — LOW (ref 11.5–15.5)
HGB BLD-MCNC: 10.5 G/DL — LOW (ref 11.5–15.5)
HGB BLD-MCNC: 10.6 G/DL — LOW (ref 11.5–15.5)
HGB BLD-MCNC: 10.7 G/DL — LOW (ref 11.5–15.5)
HGB BLD-MCNC: 10.8 G/DL — LOW (ref 11.5–15.5)
HGB BLD-MCNC: 11 G/DL — LOW (ref 11.5–15.5)
HGB BLD-MCNC: 11.1 G/DL — LOW (ref 11.5–15.5)
HGB BLD-MCNC: 11.4 G/DL — LOW (ref 11.5–15.5)
HGB BLD-MCNC: 11.5 G/DL — SIGNIFICANT CHANGE UP (ref 11.5–15.5)
HGB BLD-MCNC: 11.7 G/DL — SIGNIFICANT CHANGE UP (ref 11.5–15.5)
HGB BLD-MCNC: 11.9 G/DL — SIGNIFICANT CHANGE UP (ref 11.5–15.5)
HGB BLD-MCNC: 13.6 G/DL — SIGNIFICANT CHANGE UP (ref 11.5–15.5)
HGB BLD-MCNC: 8.9 G/DL — LOW (ref 11.5–15.5)
HGB BLD-MCNC: 9.4 G/DL — LOW (ref 11.5–15.5)
HGB BLD-MCNC: 9.5 G/DL — LOW (ref 11.5–15.5)
HGB BLD-MCNC: 9.8 G/DL — LOW (ref 11.5–15.5)
IMM GRANULOCYTES NFR BLD AUTO: 2 % — HIGH (ref 0–0.9)
INR BLD: 1.4 RATIO — HIGH (ref 0.88–1.16)
INR BLD: 1.88 RATIO — HIGH (ref 0.88–1.16)
INR BLD: 2.05 RATIO — HIGH (ref 0.88–1.16)
KETONES UR-MCNC: NEGATIVE — SIGNIFICANT CHANGE UP
LEUKOCYTE ESTERASE UR-ACNC: NEGATIVE — SIGNIFICANT CHANGE UP
LIPID PNL WITH DIRECT LDL SERPL: 117 MG/DL — HIGH
LYMPHOCYTES # BLD AUTO: 0.81 K/UL — LOW (ref 1–3.3)
LYMPHOCYTES # BLD AUTO: 5.2 % — LOW (ref 13–44)
MAGNESIUM SERPL-MCNC: 1.8 MG/DL — SIGNIFICANT CHANGE UP (ref 1.6–2.6)
MAGNESIUM SERPL-MCNC: 1.8 MG/DL — SIGNIFICANT CHANGE UP (ref 1.6–2.6)
MAGNESIUM SERPL-MCNC: 2 MG/DL — SIGNIFICANT CHANGE UP (ref 1.6–2.6)
MAGNESIUM SERPL-MCNC: 2 MG/DL — SIGNIFICANT CHANGE UP (ref 1.6–2.6)
MAGNESIUM SERPL-MCNC: 2.1 MG/DL — SIGNIFICANT CHANGE UP (ref 1.6–2.6)
MCHC RBC-ENTMCNC: 30.8 PG — SIGNIFICANT CHANGE UP (ref 27–34)
MCHC RBC-ENTMCNC: 31 GM/DL — LOW (ref 32–36)
MCHC RBC-ENTMCNC: 31.1 GM/DL — LOW (ref 32–36)
MCHC RBC-ENTMCNC: 31.2 GM/DL — LOW (ref 32–36)
MCHC RBC-ENTMCNC: 31.3 GM/DL — LOW (ref 32–36)
MCHC RBC-ENTMCNC: 31.3 PG — SIGNIFICANT CHANGE UP (ref 27–34)
MCHC RBC-ENTMCNC: 31.5 PG — SIGNIFICANT CHANGE UP (ref 27–34)
MCHC RBC-ENTMCNC: 31.6 PG — SIGNIFICANT CHANGE UP (ref 27–34)
MCHC RBC-ENTMCNC: 31.6 PG — SIGNIFICANT CHANGE UP (ref 27–34)
MCHC RBC-ENTMCNC: 31.7 GM/DL — LOW (ref 32–36)
MCHC RBC-ENTMCNC: 31.7 PG — SIGNIFICANT CHANGE UP (ref 27–34)
MCHC RBC-ENTMCNC: 31.8 PG — SIGNIFICANT CHANGE UP (ref 27–34)
MCHC RBC-ENTMCNC: 31.9 GM/DL — LOW (ref 32–36)
MCHC RBC-ENTMCNC: 32 GM/DL — SIGNIFICANT CHANGE UP (ref 32–36)
MCHC RBC-ENTMCNC: 32 GM/DL — SIGNIFICANT CHANGE UP (ref 32–36)
MCHC RBC-ENTMCNC: 32 PG — SIGNIFICANT CHANGE UP (ref 27–34)
MCHC RBC-ENTMCNC: 32 PG — SIGNIFICANT CHANGE UP (ref 27–34)
MCHC RBC-ENTMCNC: 32.1 GM/DL — SIGNIFICANT CHANGE UP (ref 32–36)
MCHC RBC-ENTMCNC: 32.1 GM/DL — SIGNIFICANT CHANGE UP (ref 32–36)
MCHC RBC-ENTMCNC: 32.1 PG — SIGNIFICANT CHANGE UP (ref 27–34)
MCHC RBC-ENTMCNC: 32.1 PG — SIGNIFICANT CHANGE UP (ref 27–34)
MCHC RBC-ENTMCNC: 32.2 PG — SIGNIFICANT CHANGE UP (ref 27–34)
MCHC RBC-ENTMCNC: 32.2 PG — SIGNIFICANT CHANGE UP (ref 27–34)
MCHC RBC-ENTMCNC: 32.3 GM/DL — SIGNIFICANT CHANGE UP (ref 32–36)
MCHC RBC-ENTMCNC: 32.3 GM/DL — SIGNIFICANT CHANGE UP (ref 32–36)
MCHC RBC-ENTMCNC: 32.3 PG — SIGNIFICANT CHANGE UP (ref 27–34)
MCHC RBC-ENTMCNC: 32.4 GM/DL — SIGNIFICANT CHANGE UP (ref 32–36)
MCHC RBC-ENTMCNC: 32.4 PG — SIGNIFICANT CHANGE UP (ref 27–34)
MCHC RBC-ENTMCNC: 32.4 PG — SIGNIFICANT CHANGE UP (ref 27–34)
MCHC RBC-ENTMCNC: 32.5 GM/DL — SIGNIFICANT CHANGE UP (ref 32–36)
MCHC RBC-ENTMCNC: 32.5 PG — SIGNIFICANT CHANGE UP (ref 27–34)
MCHC RBC-ENTMCNC: 32.7 GM/DL — SIGNIFICANT CHANGE UP (ref 32–36)
MCHC RBC-ENTMCNC: 32.7 GM/DL — SIGNIFICANT CHANGE UP (ref 32–36)
MCHC RBC-ENTMCNC: 32.8 GM/DL — SIGNIFICANT CHANGE UP (ref 32–36)
MCHC RBC-ENTMCNC: 32.8 PG — SIGNIFICANT CHANGE UP (ref 27–34)
MCHC RBC-ENTMCNC: 32.8 PG — SIGNIFICANT CHANGE UP (ref 27–34)
MCHC RBC-ENTMCNC: 33 GM/DL — SIGNIFICANT CHANGE UP (ref 32–36)
MCHC RBC-ENTMCNC: 33.1 GM/DL — SIGNIFICANT CHANGE UP (ref 32–36)
MCHC RBC-ENTMCNC: 33.1 GM/DL — SIGNIFICANT CHANGE UP (ref 32–36)
MCHC RBC-ENTMCNC: 33.1 PG — SIGNIFICANT CHANGE UP (ref 27–34)
MCHC RBC-ENTMCNC: 33.3 GM/DL — SIGNIFICANT CHANGE UP (ref 32–36)
MCHC RBC-ENTMCNC: 33.3 GM/DL — SIGNIFICANT CHANGE UP (ref 32–36)
MCHC RBC-ENTMCNC: 33.6 GM/DL — SIGNIFICANT CHANGE UP (ref 32–36)
MCV RBC AUTO: 100.3 FL — HIGH (ref 80–100)
MCV RBC AUTO: 100.7 FL — HIGH (ref 80–100)
MCV RBC AUTO: 101.3 FL — HIGH (ref 80–100)
MCV RBC AUTO: 102.4 FL — HIGH (ref 80–100)
MCV RBC AUTO: 103.2 FL — HIGH (ref 80–100)
MCV RBC AUTO: 103.2 FL — HIGH (ref 80–100)
MCV RBC AUTO: 95 FL — SIGNIFICANT CHANGE UP (ref 80–100)
MCV RBC AUTO: 95.1 FL — SIGNIFICANT CHANGE UP (ref 80–100)
MCV RBC AUTO: 95.3 FL — SIGNIFICANT CHANGE UP (ref 80–100)
MCV RBC AUTO: 96.3 FL — SIGNIFICANT CHANGE UP (ref 80–100)
MCV RBC AUTO: 97.3 FL — SIGNIFICANT CHANGE UP (ref 80–100)
MCV RBC AUTO: 97.7 FL — SIGNIFICANT CHANGE UP (ref 80–100)
MCV RBC AUTO: 98.3 FL — SIGNIFICANT CHANGE UP (ref 80–100)
MCV RBC AUTO: 98.5 FL — SIGNIFICANT CHANGE UP (ref 80–100)
MCV RBC AUTO: 98.5 FL — SIGNIFICANT CHANGE UP (ref 80–100)
MCV RBC AUTO: 98.9 FL — SIGNIFICANT CHANGE UP (ref 80–100)
MCV RBC AUTO: 98.9 FL — SIGNIFICANT CHANGE UP (ref 80–100)
MCV RBC AUTO: 99.1 FL — SIGNIFICANT CHANGE UP (ref 80–100)
MCV RBC AUTO: 99.1 FL — SIGNIFICANT CHANGE UP (ref 80–100)
MCV RBC AUTO: 99.4 FL — SIGNIFICANT CHANGE UP (ref 80–100)
MCV RBC AUTO: 99.4 FL — SIGNIFICANT CHANGE UP (ref 80–100)
MONOCYTES # BLD AUTO: 0.75 K/UL — SIGNIFICANT CHANGE UP (ref 0–0.9)
MONOCYTES NFR BLD AUTO: 4.8 % — SIGNIFICANT CHANGE UP (ref 2–14)
MRSA PCR RESULT.: SIGNIFICANT CHANGE UP
MRSA PCR RESULT.: SIGNIFICANT CHANGE UP
NEUTROPHILS # BLD AUTO: 13.59 K/UL — HIGH (ref 1.8–7.4)
NEUTROPHILS NFR BLD AUTO: 87.3 % — HIGH (ref 43–77)
NITRITE UR-MCNC: NEGATIVE — SIGNIFICANT CHANGE UP
NON HDL CHOLESTEROL: 131 MG/DL — HIGH
NRBC # BLD: 0 /100 WBCS — SIGNIFICANT CHANGE UP (ref 0–0)
PH UR: 7 — SIGNIFICANT CHANGE UP (ref 5–8)
PHOSPHATE SERPL-MCNC: 3.2 MG/DL — SIGNIFICANT CHANGE UP (ref 2.5–4.5)
PHOSPHATE SERPL-MCNC: 3.2 MG/DL — SIGNIFICANT CHANGE UP (ref 2.5–4.5)
PHOSPHATE SERPL-MCNC: 3.4 MG/DL — SIGNIFICANT CHANGE UP (ref 2.5–4.5)
PHOSPHATE SERPL-MCNC: 3.7 MG/DL — SIGNIFICANT CHANGE UP (ref 2.5–4.5)
PHOSPHATE SERPL-MCNC: 4.1 MG/DL — SIGNIFICANT CHANGE UP (ref 2.5–4.5)
PLATELET # BLD AUTO: 129 K/UL — LOW (ref 150–400)
PLATELET # BLD AUTO: 141 K/UL — LOW (ref 150–400)
PLATELET # BLD AUTO: 142 K/UL — LOW (ref 150–400)
PLATELET # BLD AUTO: 144 K/UL — LOW (ref 150–400)
PLATELET # BLD AUTO: 146 K/UL — LOW (ref 150–400)
PLATELET # BLD AUTO: 157 K/UL — SIGNIFICANT CHANGE UP (ref 150–400)
PLATELET # BLD AUTO: 158 K/UL — SIGNIFICANT CHANGE UP (ref 150–400)
PLATELET # BLD AUTO: 158 K/UL — SIGNIFICANT CHANGE UP (ref 150–400)
PLATELET # BLD AUTO: 194 K/UL — SIGNIFICANT CHANGE UP (ref 150–400)
PLATELET # BLD AUTO: 196 K/UL — SIGNIFICANT CHANGE UP (ref 150–400)
PLATELET # BLD AUTO: 219 K/UL — SIGNIFICANT CHANGE UP (ref 150–400)
PLATELET # BLD AUTO: 224 K/UL — SIGNIFICANT CHANGE UP (ref 150–400)
PLATELET # BLD AUTO: 225 K/UL — SIGNIFICANT CHANGE UP (ref 150–400)
PLATELET # BLD AUTO: 239 K/UL — SIGNIFICANT CHANGE UP (ref 150–400)
PLATELET # BLD AUTO: 242 K/UL — SIGNIFICANT CHANGE UP (ref 150–400)
PLATELET # BLD AUTO: 247 K/UL — SIGNIFICANT CHANGE UP (ref 150–400)
PLATELET # BLD AUTO: 255 K/UL — SIGNIFICANT CHANGE UP (ref 150–400)
PLATELET # BLD AUTO: 256 K/UL — SIGNIFICANT CHANGE UP (ref 150–400)
PLATELET # BLD AUTO: 262 K/UL — SIGNIFICANT CHANGE UP (ref 150–400)
PLATELET # BLD AUTO: 271 K/UL — SIGNIFICANT CHANGE UP (ref 150–400)
PLATELET # BLD AUTO: 272 K/UL — SIGNIFICANT CHANGE UP (ref 150–400)
PLATELET # BLD AUTO: 277 K/UL — SIGNIFICANT CHANGE UP (ref 150–400)
PLATELET # BLD AUTO: 286 K/UL — SIGNIFICANT CHANGE UP (ref 150–400)
POTASSIUM SERPL-MCNC: 3.7 MMOL/L — SIGNIFICANT CHANGE UP (ref 3.5–5.3)
POTASSIUM SERPL-MCNC: 3.7 MMOL/L — SIGNIFICANT CHANGE UP (ref 3.5–5.3)
POTASSIUM SERPL-MCNC: 3.9 MMOL/L — SIGNIFICANT CHANGE UP (ref 3.5–5.3)
POTASSIUM SERPL-MCNC: 4 MMOL/L — SIGNIFICANT CHANGE UP (ref 3.5–5.3)
POTASSIUM SERPL-MCNC: 4 MMOL/L — SIGNIFICANT CHANGE UP (ref 3.5–5.3)
POTASSIUM SERPL-MCNC: 4.1 MMOL/L — SIGNIFICANT CHANGE UP (ref 3.5–5.3)
POTASSIUM SERPL-MCNC: 4.2 MMOL/L — SIGNIFICANT CHANGE UP (ref 3.5–5.3)
POTASSIUM SERPL-MCNC: 4.3 MMOL/L — SIGNIFICANT CHANGE UP (ref 3.5–5.3)
POTASSIUM SERPL-MCNC: 4.3 MMOL/L — SIGNIFICANT CHANGE UP (ref 3.5–5.3)
POTASSIUM SERPL-MCNC: 4.4 MMOL/L — SIGNIFICANT CHANGE UP (ref 3.5–5.3)
POTASSIUM SERPL-MCNC: 4.4 MMOL/L — SIGNIFICANT CHANGE UP (ref 3.5–5.3)
POTASSIUM SERPL-MCNC: 4.5 MMOL/L — SIGNIFICANT CHANGE UP (ref 3.5–5.3)
POTASSIUM SERPL-MCNC: 4.8 MMOL/L — SIGNIFICANT CHANGE UP (ref 3.5–5.3)
POTASSIUM SERPL-SCNC: 3.7 MMOL/L — SIGNIFICANT CHANGE UP (ref 3.5–5.3)
POTASSIUM SERPL-SCNC: 3.7 MMOL/L — SIGNIFICANT CHANGE UP (ref 3.5–5.3)
POTASSIUM SERPL-SCNC: 3.9 MMOL/L — SIGNIFICANT CHANGE UP (ref 3.5–5.3)
POTASSIUM SERPL-SCNC: 4 MMOL/L — SIGNIFICANT CHANGE UP (ref 3.5–5.3)
POTASSIUM SERPL-SCNC: 4 MMOL/L — SIGNIFICANT CHANGE UP (ref 3.5–5.3)
POTASSIUM SERPL-SCNC: 4.1 MMOL/L — SIGNIFICANT CHANGE UP (ref 3.5–5.3)
POTASSIUM SERPL-SCNC: 4.2 MMOL/L — SIGNIFICANT CHANGE UP (ref 3.5–5.3)
POTASSIUM SERPL-SCNC: 4.3 MMOL/L — SIGNIFICANT CHANGE UP (ref 3.5–5.3)
POTASSIUM SERPL-SCNC: 4.3 MMOL/L — SIGNIFICANT CHANGE UP (ref 3.5–5.3)
POTASSIUM SERPL-SCNC: 4.4 MMOL/L — SIGNIFICANT CHANGE UP (ref 3.5–5.3)
POTASSIUM SERPL-SCNC: 4.4 MMOL/L — SIGNIFICANT CHANGE UP (ref 3.5–5.3)
POTASSIUM SERPL-SCNC: 4.5 MMOL/L — SIGNIFICANT CHANGE UP (ref 3.5–5.3)
POTASSIUM SERPL-SCNC: 4.8 MMOL/L — SIGNIFICANT CHANGE UP (ref 3.5–5.3)
PREALB SERPL-MCNC: 25 MG/DL — SIGNIFICANT CHANGE UP (ref 20–40)
PROT SERPL-MCNC: 4.9 G/DL — LOW (ref 6–8.3)
PROT SERPL-MCNC: 6.3 G/DL — SIGNIFICANT CHANGE UP (ref 6–8.3)
PROT SERPL-MCNC: 6.8 G/DL — SIGNIFICANT CHANGE UP (ref 6–8.3)
PROT SERPL-MCNC: 7.7 G/DL — SIGNIFICANT CHANGE UP (ref 6–8.3)
PROT UR-MCNC: SIGNIFICANT CHANGE UP
PROTHROM AB SERPL-ACNC: 16.2 SEC — HIGH (ref 10.5–13.4)
PROTHROM AB SERPL-ACNC: 21.9 SEC — HIGH (ref 10.5–13.4)
PROTHROM AB SERPL-ACNC: 23.8 SEC — HIGH (ref 10.5–13.4)
RBC # BLD: 2.78 M/UL — LOW (ref 3.8–5.2)
RBC # BLD: 2.94 M/UL — LOW (ref 3.8–5.2)
RBC # BLD: 2.95 M/UL — LOW (ref 3.8–5.2)
RBC # BLD: 3.05 M/UL — LOW (ref 3.8–5.2)
RBC # BLD: 3.14 M/UL — LOW (ref 3.8–5.2)
RBC # BLD: 3.16 M/UL — LOW (ref 3.8–5.2)
RBC # BLD: 3.25 M/UL — LOW (ref 3.8–5.2)
RBC # BLD: 3.25 M/UL — LOW (ref 3.8–5.2)
RBC # BLD: 3.27 M/UL — LOW (ref 3.8–5.2)
RBC # BLD: 3.3 M/UL — LOW (ref 3.8–5.2)
RBC # BLD: 3.3 M/UL — LOW (ref 3.8–5.2)
RBC # BLD: 3.32 M/UL — LOW (ref 3.8–5.2)
RBC # BLD: 3.39 M/UL — LOW (ref 3.8–5.2)
RBC # BLD: 3.43 M/UL — LOW (ref 3.8–5.2)
RBC # BLD: 3.46 M/UL — LOW (ref 3.8–5.2)
RBC # BLD: 3.48 M/UL — LOW (ref 3.8–5.2)
RBC # BLD: 3.52 M/UL — LOW (ref 3.8–5.2)
RBC # BLD: 3.53 M/UL — LOW (ref 3.8–5.2)
RBC # BLD: 3.54 M/UL — LOW (ref 3.8–5.2)
RBC # BLD: 3.57 M/UL — LOW (ref 3.8–5.2)
RBC # BLD: 3.59 M/UL — LOW (ref 3.8–5.2)
RBC # BLD: 3.8 M/UL — SIGNIFICANT CHANGE UP (ref 3.8–5.2)
RBC # BLD: 4.28 M/UL — SIGNIFICANT CHANGE UP (ref 3.8–5.2)
RBC # FLD: 12.8 % — SIGNIFICANT CHANGE UP (ref 10.3–14.5)
RBC # FLD: 12.8 % — SIGNIFICANT CHANGE UP (ref 10.3–14.5)
RBC # FLD: 12.9 % — SIGNIFICANT CHANGE UP (ref 10.3–14.5)
RBC # FLD: 12.9 % — SIGNIFICANT CHANGE UP (ref 10.3–14.5)
RBC # FLD: 13.1 % — SIGNIFICANT CHANGE UP (ref 10.3–14.5)
RBC # FLD: 13.1 % — SIGNIFICANT CHANGE UP (ref 10.3–14.5)
RBC # FLD: 13.2 % — SIGNIFICANT CHANGE UP (ref 10.3–14.5)
RBC # FLD: 13.2 % — SIGNIFICANT CHANGE UP (ref 10.3–14.5)
RBC # FLD: 13.5 % — SIGNIFICANT CHANGE UP (ref 10.3–14.5)
RBC # FLD: 13.7 % — SIGNIFICANT CHANGE UP (ref 10.3–14.5)
RBC # FLD: 13.8 % — SIGNIFICANT CHANGE UP (ref 10.3–14.5)
RBC # FLD: 13.9 % — SIGNIFICANT CHANGE UP (ref 10.3–14.5)
RBC # FLD: 14 % — SIGNIFICANT CHANGE UP (ref 10.3–14.5)
RBC # FLD: 14 % — SIGNIFICANT CHANGE UP (ref 10.3–14.5)
RBC # FLD: 14.2 % — SIGNIFICANT CHANGE UP (ref 10.3–14.5)
RBC # FLD: 14.4 % — SIGNIFICANT CHANGE UP (ref 10.3–14.5)
RBC # FLD: 14.5 % — SIGNIFICANT CHANGE UP (ref 10.3–14.5)
RBC # FLD: 14.6 % — HIGH (ref 10.3–14.5)
RBC # FLD: 14.6 % — HIGH (ref 10.3–14.5)
RH IG SCN BLD-IMP: POSITIVE — SIGNIFICANT CHANGE UP
RSV RNA NPH QL NAA+NON-PROBE: SIGNIFICANT CHANGE UP
S AUREUS DNA NOSE QL NAA+PROBE: SIGNIFICANT CHANGE UP
S AUREUS DNA NOSE QL NAA+PROBE: SIGNIFICANT CHANGE UP
SARS-COV-2 RNA SPEC QL NAA+PROBE: SIGNIFICANT CHANGE UP
SODIUM SERPL-SCNC: 134 MMOL/L — LOW (ref 135–145)
SODIUM SERPL-SCNC: 135 MMOL/L — SIGNIFICANT CHANGE UP (ref 135–145)
SODIUM SERPL-SCNC: 136 MMOL/L — SIGNIFICANT CHANGE UP (ref 135–145)
SODIUM SERPL-SCNC: 137 MMOL/L — SIGNIFICANT CHANGE UP (ref 135–145)
SODIUM SERPL-SCNC: 138 MMOL/L — SIGNIFICANT CHANGE UP (ref 135–145)
SODIUM SERPL-SCNC: 139 MMOL/L — SIGNIFICANT CHANGE UP (ref 135–145)
SODIUM SERPL-SCNC: 139 MMOL/L — SIGNIFICANT CHANGE UP (ref 135–145)
SODIUM SERPL-SCNC: 140 MMOL/L — SIGNIFICANT CHANGE UP (ref 135–145)
SODIUM SERPL-SCNC: 140 MMOL/L — SIGNIFICANT CHANGE UP (ref 135–145)
SODIUM SERPL-SCNC: 141 MMOL/L — SIGNIFICANT CHANGE UP (ref 135–145)
SODIUM SERPL-SCNC: 141 MMOL/L — SIGNIFICANT CHANGE UP (ref 135–145)
SP GR SPEC: 1.02 — SIGNIFICANT CHANGE UP (ref 1.01–1.02)
SPECIMEN SOURCE: SIGNIFICANT CHANGE UP
SPECIMEN SOURCE: SIGNIFICANT CHANGE UP
TRIGL SERPL-MCNC: 73 MG/DL — SIGNIFICANT CHANGE UP
TROPONIN T, HIGH SENSITIVITY RESULT: 308 NG/L — HIGH (ref 0–51)
TROPONIN T, HIGH SENSITIVITY RESULT: 355 NG/L — HIGH (ref 0–51)
UROBILINOGEN FLD QL: NEGATIVE — SIGNIFICANT CHANGE UP
WBC # BLD: 10.66 K/UL — HIGH (ref 3.8–10.5)
WBC # BLD: 11.04 K/UL — HIGH (ref 3.8–10.5)
WBC # BLD: 11.59 K/UL — HIGH (ref 3.8–10.5)
WBC # BLD: 11.69 K/UL — HIGH (ref 3.8–10.5)
WBC # BLD: 11.91 K/UL — HIGH (ref 3.8–10.5)
WBC # BLD: 11.95 K/UL — HIGH (ref 3.8–10.5)
WBC # BLD: 12.17 K/UL — HIGH (ref 3.8–10.5)
WBC # BLD: 12.42 K/UL — HIGH (ref 3.8–10.5)
WBC # BLD: 12.54 K/UL — HIGH (ref 3.8–10.5)
WBC # BLD: 13.16 K/UL — HIGH (ref 3.8–10.5)
WBC # BLD: 13.31 K/UL — HIGH (ref 3.8–10.5)
WBC # BLD: 13.31 K/UL — HIGH (ref 3.8–10.5)
WBC # BLD: 13.44 K/UL — HIGH (ref 3.8–10.5)
WBC # BLD: 13.74 K/UL — HIGH (ref 3.8–10.5)
WBC # BLD: 13.75 K/UL — HIGH (ref 3.8–10.5)
WBC # BLD: 14.79 K/UL — HIGH (ref 3.8–10.5)
WBC # BLD: 15.45 K/UL — HIGH (ref 3.8–10.5)
WBC # BLD: 15.56 K/UL — HIGH (ref 3.8–10.5)
WBC # BLD: 17.2 K/UL — HIGH (ref 3.8–10.5)
WBC # BLD: 18.48 K/UL — HIGH (ref 3.8–10.5)
WBC # BLD: 19.29 K/UL — HIGH (ref 3.8–10.5)
WBC # BLD: 9.78 K/UL — SIGNIFICANT CHANGE UP (ref 3.8–10.5)
WBC # BLD: 9.83 K/UL — SIGNIFICANT CHANGE UP (ref 3.8–10.5)
WBC # FLD AUTO: 10.66 K/UL — HIGH (ref 3.8–10.5)
WBC # FLD AUTO: 11.04 K/UL — HIGH (ref 3.8–10.5)
WBC # FLD AUTO: 11.59 K/UL — HIGH (ref 3.8–10.5)
WBC # FLD AUTO: 11.69 K/UL — HIGH (ref 3.8–10.5)
WBC # FLD AUTO: 11.91 K/UL — HIGH (ref 3.8–10.5)
WBC # FLD AUTO: 11.95 K/UL — HIGH (ref 3.8–10.5)
WBC # FLD AUTO: 12.17 K/UL — HIGH (ref 3.8–10.5)
WBC # FLD AUTO: 12.42 K/UL — HIGH (ref 3.8–10.5)
WBC # FLD AUTO: 12.54 K/UL — HIGH (ref 3.8–10.5)
WBC # FLD AUTO: 13.16 K/UL — HIGH (ref 3.8–10.5)
WBC # FLD AUTO: 13.31 K/UL — HIGH (ref 3.8–10.5)
WBC # FLD AUTO: 13.31 K/UL — HIGH (ref 3.8–10.5)
WBC # FLD AUTO: 13.44 K/UL — HIGH (ref 3.8–10.5)
WBC # FLD AUTO: 13.74 K/UL — HIGH (ref 3.8–10.5)
WBC # FLD AUTO: 13.75 K/UL — HIGH (ref 3.8–10.5)
WBC # FLD AUTO: 14.79 K/UL — HIGH (ref 3.8–10.5)
WBC # FLD AUTO: 15.45 K/UL — HIGH (ref 3.8–10.5)
WBC # FLD AUTO: 15.56 K/UL — HIGH (ref 3.8–10.5)
WBC # FLD AUTO: 17.2 K/UL — HIGH (ref 3.8–10.5)
WBC # FLD AUTO: 18.48 K/UL — HIGH (ref 3.8–10.5)
WBC # FLD AUTO: 19.29 K/UL — HIGH (ref 3.8–10.5)
WBC # FLD AUTO: 9.78 K/UL — SIGNIFICANT CHANGE UP (ref 3.8–10.5)
WBC # FLD AUTO: 9.83 K/UL — SIGNIFICANT CHANGE UP (ref 3.8–10.5)

## 2023-01-01 PROCEDURE — 0042T: CPT

## 2023-01-01 PROCEDURE — 72192 CT PELVIS W/O DYE: CPT | Mod: MA

## 2023-01-01 PROCEDURE — U0005: CPT

## 2023-01-01 PROCEDURE — 71250 CT THORAX DX C-: CPT

## 2023-01-01 PROCEDURE — 99223 1ST HOSP IP/OBS HIGH 75: CPT

## 2023-01-01 PROCEDURE — 70496 CT ANGIOGRAPHY HEAD: CPT

## 2023-01-01 PROCEDURE — 74230 X-RAY XM SWLNG FUNCJ C+: CPT

## 2023-01-01 PROCEDURE — 71045 X-RAY EXAM CHEST 1 VIEW: CPT | Mod: 26

## 2023-01-01 PROCEDURE — 93970 EXTREMITY STUDY: CPT

## 2023-01-01 PROCEDURE — 99233 SBSQ HOSP IP/OBS HIGH 50: CPT

## 2023-01-01 PROCEDURE — 92611 MOTION FLUOROSCOPY/SWALLOW: CPT

## 2023-01-01 PROCEDURE — 97166 OT EVAL MOD COMPLEX 45 MIN: CPT

## 2023-01-01 PROCEDURE — 82962 GLUCOSE BLOOD TEST: CPT

## 2023-01-01 PROCEDURE — 76377 3D RENDER W/INTRP POSTPROCES: CPT | Mod: 26

## 2023-01-01 PROCEDURE — 0042T: CPT | Mod: MA

## 2023-01-01 PROCEDURE — 93291 INTERROG DEV EVAL SCRMS IP: CPT | Mod: 26

## 2023-01-01 PROCEDURE — 93306 TTE W/DOPPLER COMPLETE: CPT | Mod: 26

## 2023-01-01 PROCEDURE — 71045 X-RAY EXAM CHEST 1 VIEW: CPT

## 2023-01-01 PROCEDURE — 85027 COMPLETE CBC AUTOMATED: CPT

## 2023-01-01 PROCEDURE — 86900 BLOOD TYPING SEROLOGIC ABO: CPT

## 2023-01-01 PROCEDURE — 83036 HEMOGLOBIN GLYCOSYLATED A1C: CPT

## 2023-01-01 PROCEDURE — 96374 THER/PROPH/DIAG INJ IV PUSH: CPT

## 2023-01-01 PROCEDURE — U0003: CPT

## 2023-01-01 PROCEDURE — 99232 SBSQ HOSP IP/OBS MODERATE 35: CPT

## 2023-01-01 PROCEDURE — 84100 ASSAY OF PHOSPHORUS: CPT

## 2023-01-01 PROCEDURE — 87637 SARSCOV2&INF A&B&RSV AMP PRB: CPT

## 2023-01-01 PROCEDURE — 36415 COLL VENOUS BLD VENIPUNCTURE: CPT

## 2023-01-01 PROCEDURE — 74018 RADEX ABDOMEN 1 VIEW: CPT | Mod: 26

## 2023-01-01 PROCEDURE — 84134 ASSAY OF PREALBUMIN: CPT

## 2023-01-01 PROCEDURE — 86901 BLOOD TYPING SEROLOGIC RH(D): CPT

## 2023-01-01 PROCEDURE — 99222 1ST HOSP IP/OBS MODERATE 55: CPT

## 2023-01-01 PROCEDURE — 99285 EMERGENCY DEPT VISIT HI MDM: CPT | Mod: GC

## 2023-01-01 PROCEDURE — 93306 TTE W/DOPPLER COMPLETE: CPT

## 2023-01-01 PROCEDURE — 93970 EXTREMITY STUDY: CPT | Mod: 26

## 2023-01-01 PROCEDURE — 70496 CT ANGIOGRAPHY HEAD: CPT | Mod: 26,MA

## 2023-01-01 PROCEDURE — 97116 GAIT TRAINING THERAPY: CPT

## 2023-01-01 PROCEDURE — 85610 PROTHROMBIN TIME: CPT

## 2023-01-01 PROCEDURE — 85025 COMPLETE CBC W/AUTO DIFF WBC: CPT

## 2023-01-01 PROCEDURE — 70496 CT ANGIOGRAPHY HEAD: CPT | Mod: 26

## 2023-01-01 PROCEDURE — 80053 COMPREHEN METABOLIC PANEL: CPT

## 2023-01-01 PROCEDURE — 85730 THROMBOPLASTIN TIME PARTIAL: CPT

## 2023-01-01 PROCEDURE — 97162 PT EVAL MOD COMPLEX 30 MIN: CPT

## 2023-01-01 PROCEDURE — 73523 X-RAY EXAM HIPS BI 5/> VIEWS: CPT | Mod: 26

## 2023-01-01 PROCEDURE — 73502 X-RAY EXAM HIP UNI 2-3 VIEWS: CPT

## 2023-01-01 PROCEDURE — 74018 RADEX ABDOMEN 1 VIEW: CPT

## 2023-01-01 PROCEDURE — 80061 LIPID PANEL: CPT

## 2023-01-01 PROCEDURE — 92526 ORAL FUNCTION THERAPY: CPT

## 2023-01-01 PROCEDURE — 87641 MR-STAPH DNA AMP PROBE: CPT

## 2023-01-01 PROCEDURE — 72192 CT PELVIS W/O DYE: CPT | Mod: 26,MA

## 2023-01-01 PROCEDURE — 70450 CT HEAD/BRAIN W/O DYE: CPT | Mod: MA

## 2023-01-01 PROCEDURE — 96375 TX/PRO/DX INJ NEW DRUG ADDON: CPT

## 2023-01-01 PROCEDURE — 87040 BLOOD CULTURE FOR BACTERIA: CPT

## 2023-01-01 PROCEDURE — 76377 3D RENDER W/INTRP POSTPROCES: CPT

## 2023-01-01 PROCEDURE — 84484 ASSAY OF TROPONIN QUANT: CPT

## 2023-01-01 PROCEDURE — 70498 CT ANGIOGRAPHY NECK: CPT | Mod: 26,MA

## 2023-01-01 PROCEDURE — 81003 URINALYSIS AUTO W/O SCOPE: CPT

## 2023-01-01 PROCEDURE — 97110 THERAPEUTIC EXERCISES: CPT

## 2023-01-01 PROCEDURE — 97530 THERAPEUTIC ACTIVITIES: CPT

## 2023-01-01 PROCEDURE — 97760 ORTHOTIC MGMT&TRAING 1ST ENC: CPT

## 2023-01-01 PROCEDURE — 83735 ASSAY OF MAGNESIUM: CPT

## 2023-01-01 PROCEDURE — 73552 X-RAY EXAM OF FEMUR 2/>: CPT | Mod: 26,LT,76

## 2023-01-01 PROCEDURE — 92012 INTRM OPH EXAM EST PATIENT: CPT

## 2023-01-01 PROCEDURE — 70551 MRI BRAIN STEM W/O DYE: CPT

## 2023-01-01 PROCEDURE — 74230 X-RAY XM SWLNG FUNCJ C+: CPT | Mod: 26

## 2023-01-01 PROCEDURE — 70498 CT ANGIOGRAPHY NECK: CPT | Mod: MA

## 2023-01-01 PROCEDURE — 73552 X-RAY EXAM OF FEMUR 2/>: CPT

## 2023-01-01 PROCEDURE — 99222 1ST HOSP IP/OBS MODERATE 55: CPT | Mod: GC

## 2023-01-01 PROCEDURE — 87640 STAPH A DNA AMP PROBE: CPT

## 2023-01-01 PROCEDURE — 71250 CT THORAX DX C-: CPT | Mod: 26

## 2023-01-01 PROCEDURE — 70551 MRI BRAIN STEM W/O DYE: CPT | Mod: 26

## 2023-01-01 PROCEDURE — 80048 BASIC METABOLIC PNL TOTAL CA: CPT

## 2023-01-01 PROCEDURE — 72190 X-RAY EXAM OF PELVIS: CPT

## 2023-01-01 PROCEDURE — 99223 1ST HOSP IP/OBS HIGH 75: CPT | Mod: GC

## 2023-01-01 PROCEDURE — 92610 EVALUATE SWALLOWING FUNCTION: CPT

## 2023-01-01 PROCEDURE — 99285 EMERGENCY DEPT VISIT HI MDM: CPT

## 2023-01-01 PROCEDURE — 86850 RBC ANTIBODY SCREEN: CPT

## 2023-01-01 PROCEDURE — 70498 CT ANGIOGRAPHY NECK: CPT | Mod: 26

## 2023-01-01 PROCEDURE — 92523 SPEECH SOUND LANG COMPREHEN: CPT

## 2023-01-01 RX ORDER — MORPHINE SULFATE 50 MG/1
2 CAPSULE, EXTENDED RELEASE ORAL ONCE
Refills: 0 | Status: DISCONTINUED | OUTPATIENT
Start: 2023-01-01 | End: 2023-01-01

## 2023-01-01 RX ORDER — ACETAMINOPHEN 500 MG
1000 TABLET ORAL ONCE
Refills: 0 | Status: COMPLETED | OUTPATIENT
Start: 2023-01-01 | End: 2023-01-01

## 2023-01-01 RX ORDER — LACTULOSE 10 G/15ML
10 SOLUTION ORAL ONCE
Refills: 0 | Status: COMPLETED | OUTPATIENT
Start: 2023-01-01 | End: 2023-01-01

## 2023-01-01 RX ORDER — RIVAROXABAN 15 MG-20MG
1 KIT ORAL
Qty: 0 | Refills: 0 | DISCHARGE

## 2023-01-01 RX ORDER — LANOLIN ALCOHOL/MO/W.PET/CERES
5 CREAM (GRAM) TOPICAL AT BEDTIME
Refills: 0 | Status: COMPLETED | OUTPATIENT
Start: 2023-01-01 | End: 2023-01-01

## 2023-01-01 RX ORDER — METOPROLOL TARTRATE 50 MG
25 TABLET ORAL DAILY
Refills: 0 | Status: DISCONTINUED | OUTPATIENT
Start: 2023-01-01 | End: 2023-01-01

## 2023-01-01 RX ORDER — METOPROLOL TARTRATE 50 MG
5 TABLET ORAL ONCE
Refills: 0 | Status: COMPLETED | OUTPATIENT
Start: 2023-01-01 | End: 2023-01-01

## 2023-01-01 RX ORDER — MORPHINE SULFATE 50 MG/1
1 CAPSULE, EXTENDED RELEASE ORAL EVERY 6 HOURS
Refills: 0 | Status: DISCONTINUED | OUTPATIENT
Start: 2023-01-01 | End: 2023-01-01

## 2023-01-01 RX ORDER — OXYCODONE HYDROCHLORIDE 5 MG/1
2.5 TABLET ORAL EVERY 4 HOURS
Refills: 0 | Status: DISCONTINUED | OUTPATIENT
Start: 2023-01-01 | End: 2023-01-01

## 2023-01-01 RX ORDER — SENNA PLUS 8.6 MG/1
2 TABLET ORAL AT BEDTIME
Refills: 0 | Status: DISCONTINUED | OUTPATIENT
Start: 2023-01-01 | End: 2023-01-01

## 2023-01-01 RX ORDER — MORPHINE SULFATE 50 MG/1
1 CAPSULE, EXTENDED RELEASE ORAL ONCE
Refills: 0 | Status: DISCONTINUED | OUTPATIENT
Start: 2023-01-01 | End: 2023-01-01

## 2023-01-01 RX ORDER — TRAZODONE HCL 50 MG
25 TABLET ORAL AT BEDTIME
Refills: 0 | Status: DISCONTINUED | OUTPATIENT
Start: 2023-01-01 | End: 2023-01-01

## 2023-01-01 RX ORDER — POLYETHYLENE GLYCOL 3350 17 G/17G
17 POWDER, FOR SOLUTION ORAL DAILY
Refills: 0 | Status: DISCONTINUED | OUTPATIENT
Start: 2023-01-01 | End: 2023-01-01

## 2023-01-01 RX ORDER — LOSARTAN POTASSIUM 100 MG/1
25 TABLET, FILM COATED ORAL DAILY
Refills: 0 | Status: DISCONTINUED | OUTPATIENT
Start: 2023-01-01 | End: 2023-01-01

## 2023-01-01 RX ORDER — NALOXONE HYDROCHLORIDE 4 MG/.1ML
0 SPRAY NASAL
Qty: 0 | Refills: 0 | DISCHARGE

## 2023-01-01 RX ORDER — POLYETHYLENE GLYCOL 3350 17 G/17G
17 POWDER, FOR SOLUTION ORAL
Qty: 0 | Refills: 0 | DISCHARGE
Start: 2023-01-01

## 2023-01-01 RX ORDER — APIXABAN 2.5 MG/1
1 TABLET, FILM COATED ORAL
Qty: 0 | Refills: 0 | DISCHARGE
Start: 2023-01-01

## 2023-01-01 RX ORDER — APIXABAN 2.5 MG/1
5 TABLET, FILM COATED ORAL
Refills: 0 | Status: DISCONTINUED | OUTPATIENT
Start: 2023-01-01 | End: 2023-01-01

## 2023-01-01 RX ORDER — METOPROLOL TARTRATE 50 MG
25 TABLET ORAL ONCE
Refills: 0 | Status: COMPLETED | OUTPATIENT
Start: 2023-01-01 | End: 2023-01-01

## 2023-01-01 RX ORDER — METOPROLOL TARTRATE 50 MG
75 TABLET ORAL DAILY
Refills: 0 | Status: DISCONTINUED | OUTPATIENT
Start: 2023-01-01 | End: 2023-01-01

## 2023-01-01 RX ORDER — ROSUVASTATIN CALCIUM 5 MG/1
40 TABLET ORAL AT BEDTIME
Refills: 0 | Status: DISCONTINUED | OUTPATIENT
Start: 2023-01-01 | End: 2023-01-01

## 2023-01-01 RX ORDER — CHLORHEXIDINE GLUCONATE 213 G/1000ML
1 SOLUTION TOPICAL DAILY
Refills: 0 | Status: DISCONTINUED | OUTPATIENT
Start: 2023-01-01 | End: 2023-01-01

## 2023-01-01 RX ORDER — FUROSEMIDE 40 MG
20 TABLET ORAL EVERY 12 HOURS
Refills: 0 | Status: DISCONTINUED | OUTPATIENT
Start: 2023-01-01 | End: 2023-01-01

## 2023-01-01 RX ORDER — ATORVASTATIN CALCIUM 80 MG/1
1 TABLET, FILM COATED ORAL
Qty: 0 | Refills: 0 | DISCHARGE

## 2023-01-01 RX ORDER — OXYCODONE HYDROCHLORIDE 5 MG/1
2.5 TABLET ORAL ONCE
Refills: 0 | Status: DISCONTINUED | OUTPATIENT
Start: 2023-01-01 | End: 2023-01-01

## 2023-01-01 RX ORDER — ASPIRIN/CALCIUM CARB/MAGNESIUM 324 MG
1 TABLET ORAL
Qty: 0 | Refills: 0 | DISCHARGE

## 2023-01-01 RX ORDER — ROSUVASTATIN CALCIUM 5 MG/1
1 TABLET ORAL
Qty: 0 | Refills: 0 | DISCHARGE
Start: 2023-01-01

## 2023-01-01 RX ORDER — SODIUM CHLORIDE 9 MG/ML
1000 INJECTION, SOLUTION INTRAVENOUS
Refills: 0 | Status: DISCONTINUED | OUTPATIENT
Start: 2023-01-01 | End: 2023-01-01

## 2023-01-01 RX ORDER — SENNA PLUS 8.6 MG/1
2 TABLET ORAL AT BEDTIME
Refills: 0 | Status: COMPLETED | OUTPATIENT
Start: 2023-01-01 | End: 2023-01-01

## 2023-01-01 RX ORDER — RIVAROXABAN 15 MG-20MG
15 KIT ORAL
Refills: 0 | Status: DISCONTINUED | OUTPATIENT
Start: 2023-01-01 | End: 2023-01-01

## 2023-01-01 RX ORDER — OXYCODONE HYDROCHLORIDE 5 MG/1
0.5 TABLET ORAL
Qty: 0 | Refills: 0 | DISCHARGE

## 2023-01-01 RX ORDER — ACETAMINOPHEN 500 MG
650 TABLET ORAL ONCE
Refills: 0 | Status: COMPLETED | OUTPATIENT
Start: 2023-01-01 | End: 2023-01-01

## 2023-01-01 RX ORDER — METOPROLOL TARTRATE 50 MG
3 TABLET ORAL
Qty: 0 | Refills: 0 | DISCHARGE
Start: 2023-01-01

## 2023-01-01 RX ORDER — DIGOXIN 250 MCG
250 TABLET ORAL EVERY 8 HOURS
Refills: 0 | Status: COMPLETED | OUTPATIENT
Start: 2023-01-01 | End: 2023-01-01

## 2023-01-01 RX ORDER — SODIUM CHLORIDE 9 MG/ML
250 INJECTION INTRAMUSCULAR; INTRAVENOUS; SUBCUTANEOUS ONCE
Refills: 0 | Status: COMPLETED | OUTPATIENT
Start: 2023-01-01 | End: 2023-01-01

## 2023-01-01 RX ORDER — ATORVASTATIN CALCIUM 80 MG/1
80 TABLET, FILM COATED ORAL AT BEDTIME
Refills: 0 | Status: DISCONTINUED | OUTPATIENT
Start: 2023-01-01 | End: 2023-01-01

## 2023-01-01 RX ORDER — DIGOXIN 250 MCG
250 TABLET ORAL EVERY OTHER DAY
Refills: 0 | Status: DISCONTINUED | OUTPATIENT
Start: 2023-01-01 | End: 2023-01-01

## 2023-01-01 RX ORDER — LANOLIN ALCOHOL/MO/W.PET/CERES
3 CREAM (GRAM) TOPICAL ONCE
Refills: 0 | Status: DISCONTINUED | OUTPATIENT
Start: 2023-01-01 | End: 2023-01-01

## 2023-01-01 RX ORDER — APIXABAN 2.5 MG/1
2.5 TABLET, FILM COATED ORAL EVERY 12 HOURS
Refills: 0 | Status: DISCONTINUED | OUTPATIENT
Start: 2023-01-01 | End: 2023-01-01

## 2023-01-01 RX ORDER — LISINOPRIL 2.5 MG/1
1 TABLET ORAL
Qty: 0 | Refills: 0 | DISCHARGE

## 2023-01-01 RX ORDER — ACETAMINOPHEN 500 MG
975 TABLET ORAL EVERY 6 HOURS
Refills: 0 | Status: DISCONTINUED | OUTPATIENT
Start: 2023-01-01 | End: 2023-01-01

## 2023-01-01 RX ORDER — METOPROLOL TARTRATE 50 MG
50 TABLET ORAL DAILY
Refills: 0 | Status: DISCONTINUED | OUTPATIENT
Start: 2023-01-01 | End: 2023-01-01

## 2023-01-01 RX ORDER — ACETAMINOPHEN 500 MG
3 TABLET ORAL
Qty: 0 | Refills: 0 | DISCHARGE
Start: 2023-01-01

## 2023-01-01 RX ORDER — FUROSEMIDE 40 MG
20 TABLET ORAL ONCE
Refills: 0 | Status: COMPLETED | OUTPATIENT
Start: 2023-01-01 | End: 2023-01-01

## 2023-01-01 RX ORDER — SODIUM CHLORIDE 9 MG/ML
1000 INJECTION INTRAMUSCULAR; INTRAVENOUS; SUBCUTANEOUS
Refills: 0 | Status: DISCONTINUED | OUTPATIENT
Start: 2023-01-01 | End: 2023-01-01

## 2023-01-01 RX ADMIN — Medication 975 MILLIGRAM(S): at 00:45

## 2023-01-01 RX ADMIN — Medication 975 MILLIGRAM(S): at 23:33

## 2023-01-01 RX ADMIN — Medication 650 MILLIGRAM(S): at 05:05

## 2023-01-01 RX ADMIN — APIXABAN 5 MILLIGRAM(S): 2.5 TABLET, FILM COATED ORAL at 17:28

## 2023-01-01 RX ADMIN — OXYCODONE HYDROCHLORIDE 2.5 MILLIGRAM(S): 5 TABLET ORAL at 07:02

## 2023-01-01 RX ADMIN — Medication 25 MILLIGRAM(S): at 05:43

## 2023-01-01 RX ADMIN — Medication 975 MILLIGRAM(S): at 05:44

## 2023-01-01 RX ADMIN — POLYETHYLENE GLYCOL 3350 17 GRAM(S): 17 POWDER, FOR SOLUTION ORAL at 13:00

## 2023-01-01 RX ADMIN — MORPHINE SULFATE 1 MILLIGRAM(S): 50 CAPSULE, EXTENDED RELEASE ORAL at 18:15

## 2023-01-01 RX ADMIN — OXYCODONE HYDROCHLORIDE 2.5 MILLIGRAM(S): 5 TABLET ORAL at 01:25

## 2023-01-01 RX ADMIN — Medication 25 MILLIGRAM(S): at 06:02

## 2023-01-01 RX ADMIN — Medication 75 MILLIGRAM(S): at 06:20

## 2023-01-01 RX ADMIN — APIXABAN 5 MILLIGRAM(S): 2.5 TABLET, FILM COATED ORAL at 18:42

## 2023-01-01 RX ADMIN — CHLORHEXIDINE GLUCONATE 1 APPLICATION(S): 213 SOLUTION TOPICAL at 07:48

## 2023-01-01 RX ADMIN — ROSUVASTATIN CALCIUM 40 MILLIGRAM(S): 5 TABLET ORAL at 22:18

## 2023-01-01 RX ADMIN — Medication 975 MILLIGRAM(S): at 00:40

## 2023-01-01 RX ADMIN — APIXABAN 5 MILLIGRAM(S): 2.5 TABLET, FILM COATED ORAL at 05:17

## 2023-01-01 RX ADMIN — Medication 975 MILLIGRAM(S): at 18:20

## 2023-01-01 RX ADMIN — Medication 20 MILLIGRAM(S): at 05:29

## 2023-01-01 RX ADMIN — OXYCODONE HYDROCHLORIDE 2.5 MILLIGRAM(S): 5 TABLET ORAL at 20:48

## 2023-01-01 RX ADMIN — Medication 975 MILLIGRAM(S): at 05:18

## 2023-01-01 RX ADMIN — Medication 75 MILLIGRAM(S): at 05:32

## 2023-01-01 RX ADMIN — Medication 975 MILLIGRAM(S): at 05:14

## 2023-01-01 RX ADMIN — Medication 975 MILLIGRAM(S): at 23:44

## 2023-01-01 RX ADMIN — APIXABAN 5 MILLIGRAM(S): 2.5 TABLET, FILM COATED ORAL at 17:45

## 2023-01-01 RX ADMIN — CHLORHEXIDINE GLUCONATE 1 APPLICATION(S): 213 SOLUTION TOPICAL at 13:32

## 2023-01-01 RX ADMIN — Medication 975 MILLIGRAM(S): at 11:34

## 2023-01-01 RX ADMIN — Medication 25 MILLIGRAM(S): at 05:37

## 2023-01-01 RX ADMIN — APIXABAN 5 MILLIGRAM(S): 2.5 TABLET, FILM COATED ORAL at 17:05

## 2023-01-01 RX ADMIN — Medication 975 MILLIGRAM(S): at 18:43

## 2023-01-01 RX ADMIN — SENNA PLUS 2 TABLET(S): 8.6 TABLET ORAL at 21:25

## 2023-01-01 RX ADMIN — Medication 975 MILLIGRAM(S): at 23:15

## 2023-01-01 RX ADMIN — Medication 975 MILLIGRAM(S): at 13:00

## 2023-01-01 RX ADMIN — Medication 975 MILLIGRAM(S): at 13:11

## 2023-01-01 RX ADMIN — Medication 975 MILLIGRAM(S): at 00:13

## 2023-01-01 RX ADMIN — Medication 975 MILLIGRAM(S): at 00:59

## 2023-01-01 RX ADMIN — Medication 975 MILLIGRAM(S): at 06:35

## 2023-01-01 RX ADMIN — OXYCODONE HYDROCHLORIDE 2.5 MILLIGRAM(S): 5 TABLET ORAL at 23:33

## 2023-01-01 RX ADMIN — MORPHINE SULFATE 1 MILLIGRAM(S): 50 CAPSULE, EXTENDED RELEASE ORAL at 02:19

## 2023-01-01 RX ADMIN — Medication 975 MILLIGRAM(S): at 13:41

## 2023-01-01 RX ADMIN — Medication 975 MILLIGRAM(S): at 06:32

## 2023-01-01 RX ADMIN — Medication 1000 MILLIGRAM(S): at 00:09

## 2023-01-01 RX ADMIN — Medication 25 MILLIGRAM(S): at 15:37

## 2023-01-01 RX ADMIN — OXYCODONE HYDROCHLORIDE 2.5 MILLIGRAM(S): 5 TABLET ORAL at 08:00

## 2023-01-01 RX ADMIN — Medication 975 MILLIGRAM(S): at 22:49

## 2023-01-01 RX ADMIN — Medication 975 MILLIGRAM(S): at 17:11

## 2023-01-01 RX ADMIN — Medication 975 MILLIGRAM(S): at 23:08

## 2023-01-01 RX ADMIN — CHLORHEXIDINE GLUCONATE 1 APPLICATION(S): 213 SOLUTION TOPICAL at 09:00

## 2023-01-01 RX ADMIN — MORPHINE SULFATE 1 MILLIGRAM(S): 50 CAPSULE, EXTENDED RELEASE ORAL at 18:27

## 2023-01-01 RX ADMIN — Medication 400 MILLIGRAM(S): at 10:25

## 2023-01-01 RX ADMIN — POLYETHYLENE GLYCOL 3350 17 GRAM(S): 17 POWDER, FOR SOLUTION ORAL at 12:22

## 2023-01-01 RX ADMIN — Medication 975 MILLIGRAM(S): at 12:04

## 2023-01-01 RX ADMIN — MORPHINE SULFATE 1 MILLIGRAM(S): 50 CAPSULE, EXTENDED RELEASE ORAL at 23:30

## 2023-01-01 RX ADMIN — OXYCODONE HYDROCHLORIDE 2.5 MILLIGRAM(S): 5 TABLET ORAL at 13:30

## 2023-01-01 RX ADMIN — APIXABAN 5 MILLIGRAM(S): 2.5 TABLET, FILM COATED ORAL at 17:35

## 2023-01-01 RX ADMIN — Medication 975 MILLIGRAM(S): at 23:24

## 2023-01-01 RX ADMIN — POLYETHYLENE GLYCOL 3350 17 GRAM(S): 17 POWDER, FOR SOLUTION ORAL at 13:33

## 2023-01-01 RX ADMIN — Medication 1 TABLET(S): at 11:09

## 2023-01-01 RX ADMIN — Medication 975 MILLIGRAM(S): at 17:40

## 2023-01-01 RX ADMIN — Medication 75 MILLIGRAM(S): at 05:48

## 2023-01-01 RX ADMIN — MORPHINE SULFATE 1 MILLIGRAM(S): 50 CAPSULE, EXTENDED RELEASE ORAL at 06:23

## 2023-01-01 RX ADMIN — Medication 1 TABLET(S): at 11:48

## 2023-01-01 RX ADMIN — Medication 400 MILLIGRAM(S): at 23:39

## 2023-01-01 RX ADMIN — OXYCODONE HYDROCHLORIDE 2.5 MILLIGRAM(S): 5 TABLET ORAL at 06:45

## 2023-01-01 RX ADMIN — CHLORHEXIDINE GLUCONATE 1 APPLICATION(S): 213 SOLUTION TOPICAL at 12:49

## 2023-01-01 RX ADMIN — Medication 975 MILLIGRAM(S): at 01:09

## 2023-01-01 RX ADMIN — Medication 250 MICROGRAM(S): at 12:38

## 2023-01-01 RX ADMIN — Medication 975 MILLIGRAM(S): at 19:06

## 2023-01-01 RX ADMIN — Medication 75 MILLIGRAM(S): at 05:26

## 2023-01-01 RX ADMIN — SENNA PLUS 2 TABLET(S): 8.6 TABLET ORAL at 21:38

## 2023-01-01 RX ADMIN — Medication 25 MILLIGRAM(S): at 21:48

## 2023-01-01 RX ADMIN — APIXABAN 5 MILLIGRAM(S): 2.5 TABLET, FILM COATED ORAL at 18:18

## 2023-01-01 RX ADMIN — RIVAROXABAN 15 MILLIGRAM(S): KIT at 17:41

## 2023-01-01 RX ADMIN — APIXABAN 5 MILLIGRAM(S): 2.5 TABLET, FILM COATED ORAL at 17:54

## 2023-01-01 RX ADMIN — OXYCODONE HYDROCHLORIDE 2.5 MILLIGRAM(S): 5 TABLET ORAL at 19:51

## 2023-01-01 RX ADMIN — Medication 975 MILLIGRAM(S): at 06:30

## 2023-01-01 RX ADMIN — Medication 975 MILLIGRAM(S): at 05:25

## 2023-01-01 RX ADMIN — Medication 975 MILLIGRAM(S): at 01:15

## 2023-01-01 RX ADMIN — MORPHINE SULFATE 1 MILLIGRAM(S): 50 CAPSULE, EXTENDED RELEASE ORAL at 22:33

## 2023-01-01 RX ADMIN — Medication 975 MILLIGRAM(S): at 00:30

## 2023-01-01 RX ADMIN — APIXABAN 5 MILLIGRAM(S): 2.5 TABLET, FILM COATED ORAL at 05:06

## 2023-01-01 RX ADMIN — Medication 975 MILLIGRAM(S): at 14:00

## 2023-01-01 RX ADMIN — OXYCODONE HYDROCHLORIDE 2.5 MILLIGRAM(S): 5 TABLET ORAL at 16:01

## 2023-01-01 RX ADMIN — Medication 975 MILLIGRAM(S): at 11:13

## 2023-01-01 RX ADMIN — OXYCODONE HYDROCHLORIDE 2.5 MILLIGRAM(S): 5 TABLET ORAL at 16:59

## 2023-01-01 RX ADMIN — Medication 975 MILLIGRAM(S): at 13:08

## 2023-01-01 RX ADMIN — MORPHINE SULFATE 1 MILLIGRAM(S): 50 CAPSULE, EXTENDED RELEASE ORAL at 17:44

## 2023-01-01 RX ADMIN — Medication 975 MILLIGRAM(S): at 16:59

## 2023-01-01 RX ADMIN — Medication 975 MILLIGRAM(S): at 23:21

## 2023-01-01 RX ADMIN — ROSUVASTATIN CALCIUM 40 MILLIGRAM(S): 5 TABLET ORAL at 21:48

## 2023-01-01 RX ADMIN — Medication 50 MILLIGRAM(S): at 05:20

## 2023-01-01 RX ADMIN — MORPHINE SULFATE 1 MILLIGRAM(S): 50 CAPSULE, EXTENDED RELEASE ORAL at 09:21

## 2023-01-01 RX ADMIN — Medication 975 MILLIGRAM(S): at 05:17

## 2023-01-01 RX ADMIN — RIVAROXABAN 15 MILLIGRAM(S): KIT at 18:02

## 2023-01-01 RX ADMIN — Medication 975 MILLIGRAM(S): at 12:10

## 2023-01-01 RX ADMIN — MORPHINE SULFATE 1 MILLIGRAM(S): 50 CAPSULE, EXTENDED RELEASE ORAL at 23:01

## 2023-01-01 RX ADMIN — APIXABAN 5 MILLIGRAM(S): 2.5 TABLET, FILM COATED ORAL at 17:11

## 2023-01-01 RX ADMIN — OXYCODONE HYDROCHLORIDE 2.5 MILLIGRAM(S): 5 TABLET ORAL at 17:50

## 2023-01-01 RX ADMIN — MORPHINE SULFATE 1 MILLIGRAM(S): 50 CAPSULE, EXTENDED RELEASE ORAL at 05:25

## 2023-01-01 RX ADMIN — MORPHINE SULFATE 1 MILLIGRAM(S): 50 CAPSULE, EXTENDED RELEASE ORAL at 18:57

## 2023-01-01 RX ADMIN — MORPHINE SULFATE 1 MILLIGRAM(S): 50 CAPSULE, EXTENDED RELEASE ORAL at 02:53

## 2023-01-01 RX ADMIN — Medication 975 MILLIGRAM(S): at 01:00

## 2023-01-01 RX ADMIN — Medication 975 MILLIGRAM(S): at 01:14

## 2023-01-01 RX ADMIN — ROSUVASTATIN CALCIUM 40 MILLIGRAM(S): 5 TABLET ORAL at 22:21

## 2023-01-01 RX ADMIN — Medication 400 MILLIGRAM(S): at 00:50

## 2023-01-01 RX ADMIN — APIXABAN 5 MILLIGRAM(S): 2.5 TABLET, FILM COATED ORAL at 17:23

## 2023-01-01 RX ADMIN — Medication 5 MILLIGRAM(S): at 22:21

## 2023-01-01 RX ADMIN — APIXABAN 5 MILLIGRAM(S): 2.5 TABLET, FILM COATED ORAL at 06:20

## 2023-01-01 RX ADMIN — ROSUVASTATIN CALCIUM 40 MILLIGRAM(S): 5 TABLET ORAL at 23:04

## 2023-01-01 RX ADMIN — Medication 975 MILLIGRAM(S): at 18:17

## 2023-01-01 RX ADMIN — SENNA PLUS 2 TABLET(S): 8.6 TABLET ORAL at 23:49

## 2023-01-01 RX ADMIN — Medication 975 MILLIGRAM(S): at 11:38

## 2023-01-01 RX ADMIN — Medication 975 MILLIGRAM(S): at 12:18

## 2023-01-01 RX ADMIN — SODIUM CHLORIDE 60 MILLILITER(S): 9 INJECTION, SOLUTION INTRAVENOUS at 11:48

## 2023-01-01 RX ADMIN — ROSUVASTATIN CALCIUM 40 MILLIGRAM(S): 5 TABLET ORAL at 22:02

## 2023-01-01 RX ADMIN — OXYCODONE HYDROCHLORIDE 2.5 MILLIGRAM(S): 5 TABLET ORAL at 23:50

## 2023-01-01 RX ADMIN — Medication 975 MILLIGRAM(S): at 19:00

## 2023-01-01 RX ADMIN — LOSARTAN POTASSIUM 25 MILLIGRAM(S): 100 TABLET, FILM COATED ORAL at 05:37

## 2023-01-01 RX ADMIN — Medication 1 TABLET(S): at 11:17

## 2023-01-01 RX ADMIN — Medication 975 MILLIGRAM(S): at 18:15

## 2023-01-01 RX ADMIN — CHLORHEXIDINE GLUCONATE 1 APPLICATION(S): 213 SOLUTION TOPICAL at 10:11

## 2023-01-01 RX ADMIN — Medication 25 MILLIGRAM(S): at 05:14

## 2023-01-01 RX ADMIN — OXYCODONE HYDROCHLORIDE 2.5 MILLIGRAM(S): 5 TABLET ORAL at 16:14

## 2023-01-01 RX ADMIN — Medication 975 MILLIGRAM(S): at 06:15

## 2023-01-01 RX ADMIN — Medication 25 MILLIGRAM(S): at 03:58

## 2023-01-01 RX ADMIN — SODIUM CHLORIDE 60 MILLILITER(S): 9 INJECTION, SOLUTION INTRAVENOUS at 16:55

## 2023-01-01 RX ADMIN — Medication 975 MILLIGRAM(S): at 18:13

## 2023-01-01 RX ADMIN — APIXABAN 5 MILLIGRAM(S): 2.5 TABLET, FILM COATED ORAL at 06:22

## 2023-01-01 RX ADMIN — Medication 1000 MILLIGRAM(S): at 06:23

## 2023-01-01 RX ADMIN — Medication 25 MILLIGRAM(S): at 01:32

## 2023-01-01 RX ADMIN — Medication 975 MILLIGRAM(S): at 00:10

## 2023-01-01 RX ADMIN — Medication 975 MILLIGRAM(S): at 12:38

## 2023-01-01 RX ADMIN — Medication 975 MILLIGRAM(S): at 07:00

## 2023-01-01 RX ADMIN — Medication 975 MILLIGRAM(S): at 06:38

## 2023-01-01 RX ADMIN — SENNA PLUS 2 TABLET(S): 8.6 TABLET ORAL at 21:48

## 2023-01-01 RX ADMIN — Medication 5 MILLIGRAM(S): at 23:16

## 2023-01-01 RX ADMIN — CHLORHEXIDINE GLUCONATE 1 APPLICATION(S): 213 SOLUTION TOPICAL at 12:38

## 2023-01-01 RX ADMIN — Medication 975 MILLIGRAM(S): at 12:22

## 2023-01-01 RX ADMIN — Medication 975 MILLIGRAM(S): at 23:34

## 2023-01-01 RX ADMIN — APIXABAN 5 MILLIGRAM(S): 2.5 TABLET, FILM COATED ORAL at 05:25

## 2023-01-01 RX ADMIN — POLYETHYLENE GLYCOL 3350 17 GRAM(S): 17 POWDER, FOR SOLUTION ORAL at 14:09

## 2023-01-01 RX ADMIN — CHLORHEXIDINE GLUCONATE 1 APPLICATION(S): 213 SOLUTION TOPICAL at 13:15

## 2023-01-01 RX ADMIN — Medication 975 MILLIGRAM(S): at 23:00

## 2023-01-01 RX ADMIN — ROSUVASTATIN CALCIUM 40 MILLIGRAM(S): 5 TABLET ORAL at 21:43

## 2023-01-01 RX ADMIN — APIXABAN 5 MILLIGRAM(S): 2.5 TABLET, FILM COATED ORAL at 06:32

## 2023-01-01 RX ADMIN — Medication 975 MILLIGRAM(S): at 12:15

## 2023-01-01 RX ADMIN — Medication 975 MILLIGRAM(S): at 06:52

## 2023-01-01 RX ADMIN — Medication 975 MILLIGRAM(S): at 18:11

## 2023-01-01 RX ADMIN — OXYCODONE HYDROCHLORIDE 2.5 MILLIGRAM(S): 5 TABLET ORAL at 22:55

## 2023-01-01 RX ADMIN — Medication 25 MILLIGRAM(S): at 06:56

## 2023-01-01 RX ADMIN — RIVAROXABAN 15 MILLIGRAM(S): KIT at 17:47

## 2023-01-01 RX ADMIN — CHLORHEXIDINE GLUCONATE 1 APPLICATION(S): 213 SOLUTION TOPICAL at 12:11

## 2023-01-01 RX ADMIN — OXYCODONE HYDROCHLORIDE 2.5 MILLIGRAM(S): 5 TABLET ORAL at 14:56

## 2023-01-01 RX ADMIN — CHLORHEXIDINE GLUCONATE 1 APPLICATION(S): 213 SOLUTION TOPICAL at 12:16

## 2023-01-01 RX ADMIN — OXYCODONE HYDROCHLORIDE 2.5 MILLIGRAM(S): 5 TABLET ORAL at 01:09

## 2023-01-01 RX ADMIN — APIXABAN 5 MILLIGRAM(S): 2.5 TABLET, FILM COATED ORAL at 18:36

## 2023-01-01 RX ADMIN — Medication 75 MILLIGRAM(S): at 06:22

## 2023-01-01 RX ADMIN — APIXABAN 5 MILLIGRAM(S): 2.5 TABLET, FILM COATED ORAL at 18:13

## 2023-01-01 RX ADMIN — Medication 20 MILLIGRAM(S): at 17:38

## 2023-01-01 RX ADMIN — Medication 25 MILLIGRAM(S): at 06:49

## 2023-01-01 RX ADMIN — Medication 975 MILLIGRAM(S): at 05:10

## 2023-01-01 RX ADMIN — OXYCODONE HYDROCHLORIDE 2.5 MILLIGRAM(S): 5 TABLET ORAL at 20:30

## 2023-01-01 RX ADMIN — OXYCODONE HYDROCHLORIDE 2.5 MILLIGRAM(S): 5 TABLET ORAL at 00:12

## 2023-01-01 RX ADMIN — APIXABAN 5 MILLIGRAM(S): 2.5 TABLET, FILM COATED ORAL at 17:39

## 2023-01-01 RX ADMIN — Medication 975 MILLIGRAM(S): at 18:54

## 2023-01-01 RX ADMIN — ATORVASTATIN CALCIUM 80 MILLIGRAM(S): 80 TABLET, FILM COATED ORAL at 22:44

## 2023-01-01 RX ADMIN — CHLORHEXIDINE GLUCONATE 1 APPLICATION(S): 213 SOLUTION TOPICAL at 11:17

## 2023-01-01 RX ADMIN — Medication 975 MILLIGRAM(S): at 11:20

## 2023-01-01 RX ADMIN — Medication 975 MILLIGRAM(S): at 12:59

## 2023-01-01 RX ADMIN — Medication 975 MILLIGRAM(S): at 18:24

## 2023-01-01 RX ADMIN — Medication 975 MILLIGRAM(S): at 12:45

## 2023-01-01 RX ADMIN — ROSUVASTATIN CALCIUM 40 MILLIGRAM(S): 5 TABLET ORAL at 21:38

## 2023-01-01 RX ADMIN — MORPHINE SULFATE 2 MILLIGRAM(S): 50 CAPSULE, EXTENDED RELEASE ORAL at 11:04

## 2023-01-01 RX ADMIN — Medication 1 TABLET(S): at 11:21

## 2023-01-01 RX ADMIN — Medication 975 MILLIGRAM(S): at 05:20

## 2023-01-01 RX ADMIN — Medication 20 MILLIGRAM(S): at 06:22

## 2023-01-01 RX ADMIN — MORPHINE SULFATE 1 MILLIGRAM(S): 50 CAPSULE, EXTENDED RELEASE ORAL at 02:31

## 2023-01-01 RX ADMIN — RIVAROXABAN 15 MILLIGRAM(S): KIT at 17:07

## 2023-01-01 RX ADMIN — Medication 975 MILLIGRAM(S): at 17:43

## 2023-01-01 RX ADMIN — CHLORHEXIDINE GLUCONATE 1 APPLICATION(S): 213 SOLUTION TOPICAL at 11:44

## 2023-01-01 RX ADMIN — Medication 975 MILLIGRAM(S): at 17:37

## 2023-01-01 RX ADMIN — APIXABAN 5 MILLIGRAM(S): 2.5 TABLET, FILM COATED ORAL at 06:02

## 2023-01-01 RX ADMIN — CHLORHEXIDINE GLUCONATE 1 APPLICATION(S): 213 SOLUTION TOPICAL at 11:49

## 2023-01-01 RX ADMIN — Medication 975 MILLIGRAM(S): at 17:47

## 2023-01-01 RX ADMIN — CHLORHEXIDINE GLUCONATE 1 APPLICATION(S): 213 SOLUTION TOPICAL at 12:35

## 2023-01-01 RX ADMIN — MORPHINE SULFATE 1 MILLIGRAM(S): 50 CAPSULE, EXTENDED RELEASE ORAL at 15:59

## 2023-01-01 RX ADMIN — OXYCODONE HYDROCHLORIDE 2.5 MILLIGRAM(S): 5 TABLET ORAL at 21:43

## 2023-01-01 RX ADMIN — CHLORHEXIDINE GLUCONATE 1 APPLICATION(S): 213 SOLUTION TOPICAL at 11:53

## 2023-01-01 RX ADMIN — POLYETHYLENE GLYCOL 3350 17 GRAM(S): 17 POWDER, FOR SOLUTION ORAL at 11:30

## 2023-01-01 RX ADMIN — OXYCODONE HYDROCHLORIDE 2.5 MILLIGRAM(S): 5 TABLET ORAL at 05:39

## 2023-01-01 RX ADMIN — Medication 975 MILLIGRAM(S): at 18:06

## 2023-01-01 RX ADMIN — Medication 975 MILLIGRAM(S): at 23:51

## 2023-01-01 RX ADMIN — APIXABAN 5 MILLIGRAM(S): 2.5 TABLET, FILM COATED ORAL at 18:24

## 2023-01-01 RX ADMIN — APIXABAN 5 MILLIGRAM(S): 2.5 TABLET, FILM COATED ORAL at 05:32

## 2023-01-01 RX ADMIN — ROSUVASTATIN CALCIUM 40 MILLIGRAM(S): 5 TABLET ORAL at 21:47

## 2023-01-01 RX ADMIN — MORPHINE SULFATE 1 MILLIGRAM(S): 50 CAPSULE, EXTENDED RELEASE ORAL at 12:10

## 2023-01-01 RX ADMIN — Medication 975 MILLIGRAM(S): at 18:36

## 2023-01-01 RX ADMIN — POLYETHYLENE GLYCOL 3350 17 GRAM(S): 17 POWDER, FOR SOLUTION ORAL at 12:14

## 2023-01-01 RX ADMIN — ROSUVASTATIN CALCIUM 40 MILLIGRAM(S): 5 TABLET ORAL at 21:25

## 2023-01-01 RX ADMIN — OXYCODONE HYDROCHLORIDE 2.5 MILLIGRAM(S): 5 TABLET ORAL at 04:42

## 2023-01-01 RX ADMIN — Medication 975 MILLIGRAM(S): at 05:55

## 2023-01-01 RX ADMIN — Medication 975 MILLIGRAM(S): at 05:06

## 2023-01-01 RX ADMIN — Medication 975 MILLIGRAM(S): at 05:58

## 2023-01-01 RX ADMIN — Medication 975 MILLIGRAM(S): at 13:55

## 2023-01-01 RX ADMIN — Medication 975 MILLIGRAM(S): at 06:56

## 2023-01-01 RX ADMIN — Medication 975 MILLIGRAM(S): at 18:30

## 2023-01-01 RX ADMIN — LOSARTAN POTASSIUM 25 MILLIGRAM(S): 100 TABLET, FILM COATED ORAL at 05:17

## 2023-01-01 RX ADMIN — Medication 975 MILLIGRAM(S): at 17:54

## 2023-01-01 RX ADMIN — LACTULOSE 10 GRAM(S): 10 SOLUTION ORAL at 00:40

## 2023-01-01 RX ADMIN — CHLORHEXIDINE GLUCONATE 1 APPLICATION(S): 213 SOLUTION TOPICAL at 16:31

## 2023-01-01 RX ADMIN — LOSARTAN POTASSIUM 25 MILLIGRAM(S): 100 TABLET, FILM COATED ORAL at 05:52

## 2023-01-01 RX ADMIN — Medication 975 MILLIGRAM(S): at 23:10

## 2023-01-01 RX ADMIN — MORPHINE SULFATE 1 MILLIGRAM(S): 50 CAPSULE, EXTENDED RELEASE ORAL at 10:20

## 2023-01-01 RX ADMIN — Medication 975 MILLIGRAM(S): at 11:48

## 2023-01-01 RX ADMIN — Medication 975 MILLIGRAM(S): at 05:48

## 2023-01-01 RX ADMIN — Medication 975 MILLIGRAM(S): at 11:41

## 2023-01-01 RX ADMIN — CHLORHEXIDINE GLUCONATE 1 APPLICATION(S): 213 SOLUTION TOPICAL at 11:00

## 2023-01-01 RX ADMIN — APIXABAN 5 MILLIGRAM(S): 2.5 TABLET, FILM COATED ORAL at 06:53

## 2023-01-01 RX ADMIN — Medication 10 MILLIGRAM(S): at 06:31

## 2023-01-01 RX ADMIN — SODIUM CHLORIDE 250 MILLILITER(S): 9 INJECTION INTRAMUSCULAR; INTRAVENOUS; SUBCUTANEOUS at 02:30

## 2023-01-01 RX ADMIN — Medication 975 MILLIGRAM(S): at 21:21

## 2023-01-01 RX ADMIN — Medication 975 MILLIGRAM(S): at 17:46

## 2023-01-01 RX ADMIN — Medication 975 MILLIGRAM(S): at 18:42

## 2023-01-01 RX ADMIN — Medication 975 MILLIGRAM(S): at 17:39

## 2023-01-01 RX ADMIN — OXYCODONE HYDROCHLORIDE 2.5 MILLIGRAM(S): 5 TABLET ORAL at 15:34

## 2023-01-01 RX ADMIN — Medication 975 MILLIGRAM(S): at 11:51

## 2023-01-01 RX ADMIN — Medication 975 MILLIGRAM(S): at 17:23

## 2023-01-01 RX ADMIN — Medication 975 MILLIGRAM(S): at 11:17

## 2023-01-01 RX ADMIN — MORPHINE SULFATE 1 MILLIGRAM(S): 50 CAPSULE, EXTENDED RELEASE ORAL at 17:04

## 2023-01-01 RX ADMIN — Medication 975 MILLIGRAM(S): at 13:48

## 2023-01-01 RX ADMIN — Medication 975 MILLIGRAM(S): at 05:35

## 2023-01-01 RX ADMIN — MORPHINE SULFATE 1 MILLIGRAM(S): 50 CAPSULE, EXTENDED RELEASE ORAL at 00:30

## 2023-01-01 RX ADMIN — CHLORHEXIDINE GLUCONATE 1 APPLICATION(S): 213 SOLUTION TOPICAL at 13:31

## 2023-01-01 RX ADMIN — OXYCODONE HYDROCHLORIDE 2.5 MILLIGRAM(S): 5 TABLET ORAL at 21:30

## 2023-01-01 RX ADMIN — CHLORHEXIDINE GLUCONATE 1 APPLICATION(S): 213 SOLUTION TOPICAL at 12:06

## 2023-01-01 RX ADMIN — Medication 5 MILLIGRAM(S): at 21:37

## 2023-01-01 RX ADMIN — Medication 975 MILLIGRAM(S): at 05:50

## 2023-01-01 RX ADMIN — Medication 975 MILLIGRAM(S): at 17:07

## 2023-01-01 RX ADMIN — APIXABAN 5 MILLIGRAM(S): 2.5 TABLET, FILM COATED ORAL at 05:40

## 2023-01-01 RX ADMIN — Medication 25 MILLIGRAM(S): at 05:28

## 2023-01-01 RX ADMIN — Medication 975 MILLIGRAM(S): at 01:34

## 2023-01-01 RX ADMIN — OXYCODONE HYDROCHLORIDE 2.5 MILLIGRAM(S): 5 TABLET ORAL at 12:53

## 2023-01-01 RX ADMIN — MORPHINE SULFATE 1 MILLIGRAM(S): 50 CAPSULE, EXTENDED RELEASE ORAL at 01:00

## 2023-01-01 RX ADMIN — Medication 975 MILLIGRAM(S): at 02:42

## 2023-01-01 RX ADMIN — SENNA PLUS 2 TABLET(S): 8.6 TABLET ORAL at 21:53

## 2023-01-01 RX ADMIN — Medication 1000 MILLIGRAM(S): at 17:27

## 2023-01-01 RX ADMIN — CHLORHEXIDINE GLUCONATE 1 APPLICATION(S): 213 SOLUTION TOPICAL at 11:52

## 2023-01-01 RX ADMIN — Medication 25 MILLIGRAM(S): at 05:17

## 2023-01-01 RX ADMIN — Medication 975 MILLIGRAM(S): at 01:42

## 2023-01-01 RX ADMIN — Medication 975 MILLIGRAM(S): at 17:30

## 2023-01-01 RX ADMIN — RIVAROXABAN 15 MILLIGRAM(S): KIT at 16:38

## 2023-01-01 RX ADMIN — Medication 25 MILLIGRAM(S): at 05:52

## 2023-01-01 RX ADMIN — Medication 975 MILLIGRAM(S): at 12:28

## 2023-01-01 RX ADMIN — Medication 1 TABLET(S): at 11:30

## 2023-01-01 RX ADMIN — ROSUVASTATIN CALCIUM 40 MILLIGRAM(S): 5 TABLET ORAL at 21:53

## 2023-01-01 RX ADMIN — Medication 1 TABLET(S): at 12:40

## 2023-01-01 RX ADMIN — OXYCODONE HYDROCHLORIDE 2.5 MILLIGRAM(S): 5 TABLET ORAL at 15:53

## 2023-01-01 RX ADMIN — RIVAROXABAN 15 MILLIGRAM(S): KIT at 18:27

## 2023-01-01 RX ADMIN — APIXABAN 5 MILLIGRAM(S): 2.5 TABLET, FILM COATED ORAL at 17:03

## 2023-01-01 RX ADMIN — Medication 975 MILLIGRAM(S): at 05:49

## 2023-01-01 RX ADMIN — OXYCODONE HYDROCHLORIDE 2.5 MILLIGRAM(S): 5 TABLET ORAL at 17:05

## 2023-01-01 RX ADMIN — MORPHINE SULFATE 1 MILLIGRAM(S): 50 CAPSULE, EXTENDED RELEASE ORAL at 12:59

## 2023-01-01 RX ADMIN — POLYETHYLENE GLYCOL 3350 17 GRAM(S): 17 POWDER, FOR SOLUTION ORAL at 11:19

## 2023-01-01 RX ADMIN — Medication 975 MILLIGRAM(S): at 00:29

## 2023-01-01 RX ADMIN — Medication 1 ENEMA: at 02:31

## 2023-01-01 RX ADMIN — MORPHINE SULFATE 1 MILLIGRAM(S): 50 CAPSULE, EXTENDED RELEASE ORAL at 02:23

## 2023-01-01 RX ADMIN — Medication 975 MILLIGRAM(S): at 06:20

## 2023-01-01 RX ADMIN — Medication 25 MILLIGRAM(S): at 06:32

## 2023-01-01 RX ADMIN — Medication 975 MILLIGRAM(S): at 11:21

## 2023-01-01 RX ADMIN — OXYCODONE HYDROCHLORIDE 2.5 MILLIGRAM(S): 5 TABLET ORAL at 02:48

## 2023-01-01 RX ADMIN — Medication 975 MILLIGRAM(S): at 11:30

## 2023-01-01 RX ADMIN — ROSUVASTATIN CALCIUM 40 MILLIGRAM(S): 5 TABLET ORAL at 21:17

## 2023-01-01 RX ADMIN — CHLORHEXIDINE GLUCONATE 1 APPLICATION(S): 213 SOLUTION TOPICAL at 13:00

## 2023-01-01 RX ADMIN — Medication 650 MILLIGRAM(S): at 03:58

## 2023-01-01 RX ADMIN — Medication 975 MILLIGRAM(S): at 05:31

## 2023-01-01 RX ADMIN — APIXABAN 2.5 MILLIGRAM(S): 2.5 TABLET, FILM COATED ORAL at 05:15

## 2023-01-01 RX ADMIN — MORPHINE SULFATE 2 MILLIGRAM(S): 50 CAPSULE, EXTENDED RELEASE ORAL at 17:27

## 2023-01-01 RX ADMIN — Medication 25 MILLIGRAM(S): at 05:39

## 2023-01-01 RX ADMIN — OXYCODONE HYDROCHLORIDE 2.5 MILLIGRAM(S): 5 TABLET ORAL at 23:04

## 2023-01-01 RX ADMIN — OXYCODONE HYDROCHLORIDE 2.5 MILLIGRAM(S): 5 TABLET ORAL at 22:45

## 2023-01-01 RX ADMIN — OXYCODONE HYDROCHLORIDE 2.5 MILLIGRAM(S): 5 TABLET ORAL at 01:34

## 2023-01-01 RX ADMIN — ATORVASTATIN CALCIUM 80 MILLIGRAM(S): 80 TABLET, FILM COATED ORAL at 22:10

## 2023-01-01 RX ADMIN — Medication 975 MILLIGRAM(S): at 08:05

## 2023-01-01 RX ADMIN — ROSUVASTATIN CALCIUM 40 MILLIGRAM(S): 5 TABLET ORAL at 21:42

## 2023-01-01 RX ADMIN — SODIUM CHLORIDE 75 MILLILITER(S): 9 INJECTION INTRAMUSCULAR; INTRAVENOUS; SUBCUTANEOUS at 11:20

## 2023-01-01 RX ADMIN — Medication 20 MILLIGRAM(S): at 17:26

## 2023-01-01 RX ADMIN — OXYCODONE HYDROCHLORIDE 2.5 MILLIGRAM(S): 5 TABLET ORAL at 12:28

## 2023-01-01 RX ADMIN — Medication 975 MILLIGRAM(S): at 13:32

## 2023-01-01 RX ADMIN — MORPHINE SULFATE 1 MILLIGRAM(S): 50 CAPSULE, EXTENDED RELEASE ORAL at 06:46

## 2023-01-01 RX ADMIN — Medication 25 MILLIGRAM(S): at 05:06

## 2023-01-01 RX ADMIN — Medication 975 MILLIGRAM(S): at 12:13

## 2023-01-01 RX ADMIN — Medication 975 MILLIGRAM(S): at 18:27

## 2023-01-01 RX ADMIN — OXYCODONE HYDROCHLORIDE 2.5 MILLIGRAM(S): 5 TABLET ORAL at 00:50

## 2023-01-01 RX ADMIN — Medication 975 MILLIGRAM(S): at 06:14

## 2023-01-01 RX ADMIN — SODIUM CHLORIDE 30 MILLILITER(S): 9 INJECTION, SOLUTION INTRAVENOUS at 18:18

## 2023-01-01 RX ADMIN — Medication 975 MILLIGRAM(S): at 06:50

## 2023-01-01 RX ADMIN — Medication 975 MILLIGRAM(S): at 17:35

## 2023-01-01 RX ADMIN — Medication 5 MILLIGRAM(S): at 23:24

## 2023-01-01 RX ADMIN — Medication 975 MILLIGRAM(S): at 05:52

## 2023-01-01 RX ADMIN — OXYCODONE HYDROCHLORIDE 2.5 MILLIGRAM(S): 5 TABLET ORAL at 20:18

## 2023-01-01 RX ADMIN — CHLORHEXIDINE GLUCONATE 1 APPLICATION(S): 213 SOLUTION TOPICAL at 11:35

## 2023-01-01 RX ADMIN — APIXABAN 5 MILLIGRAM(S): 2.5 TABLET, FILM COATED ORAL at 06:56

## 2023-01-01 RX ADMIN — MORPHINE SULFATE 1 MILLIGRAM(S): 50 CAPSULE, EXTENDED RELEASE ORAL at 07:18

## 2023-01-01 RX ADMIN — ROSUVASTATIN CALCIUM 40 MILLIGRAM(S): 5 TABLET ORAL at 21:37

## 2023-01-01 RX ADMIN — Medication 10 MILLIGRAM(S): at 17:04

## 2023-01-01 RX ADMIN — ATORVASTATIN CALCIUM 80 MILLIGRAM(S): 80 TABLET, FILM COATED ORAL at 21:12

## 2023-01-01 RX ADMIN — Medication 1 TABLET(S): at 12:59

## 2023-01-01 RX ADMIN — Medication 25 MILLIGRAM(S): at 21:16

## 2023-01-01 RX ADMIN — Medication 975 MILLIGRAM(S): at 12:34

## 2023-01-01 RX ADMIN — Medication 975 MILLIGRAM(S): at 17:59

## 2023-01-01 RX ADMIN — Medication 20 MILLIGRAM(S): at 22:33

## 2023-01-01 RX ADMIN — OXYCODONE HYDROCHLORIDE 2.5 MILLIGRAM(S): 5 TABLET ORAL at 00:27

## 2023-01-01 RX ADMIN — SENNA PLUS 2 TABLET(S): 8.6 TABLET ORAL at 22:23

## 2023-01-01 RX ADMIN — MORPHINE SULFATE 1 MILLIGRAM(S): 50 CAPSULE, EXTENDED RELEASE ORAL at 22:50

## 2023-01-01 RX ADMIN — OXYCODONE HYDROCHLORIDE 2.5 MILLIGRAM(S): 5 TABLET ORAL at 12:58

## 2023-01-01 RX ADMIN — Medication 975 MILLIGRAM(S): at 07:26

## 2023-01-01 RX ADMIN — MORPHINE SULFATE 1 MILLIGRAM(S): 50 CAPSULE, EXTENDED RELEASE ORAL at 23:03

## 2023-01-01 RX ADMIN — Medication 975 MILLIGRAM(S): at 00:17

## 2023-01-01 RX ADMIN — Medication 1 TABLET(S): at 12:28

## 2023-01-01 RX ADMIN — RIVAROXABAN 15 MILLIGRAM(S): KIT at 17:44

## 2023-01-01 RX ADMIN — Medication 975 MILLIGRAM(S): at 14:02

## 2023-01-01 RX ADMIN — POLYETHYLENE GLYCOL 3350 17 GRAM(S): 17 POWDER, FOR SOLUTION ORAL at 12:38

## 2023-01-01 RX ADMIN — OXYCODONE HYDROCHLORIDE 2.5 MILLIGRAM(S): 5 TABLET ORAL at 16:48

## 2023-01-01 RX ADMIN — Medication 975 MILLIGRAM(S): at 07:35

## 2023-01-01 RX ADMIN — Medication 975 MILLIGRAM(S): at 02:00

## 2023-01-01 RX ADMIN — Medication 975 MILLIGRAM(S): at 23:49

## 2023-01-01 RX ADMIN — ROSUVASTATIN CALCIUM 40 MILLIGRAM(S): 5 TABLET ORAL at 21:56

## 2023-01-01 RX ADMIN — OXYCODONE HYDROCHLORIDE 2.5 MILLIGRAM(S): 5 TABLET ORAL at 16:38

## 2023-01-01 RX ADMIN — OXYCODONE HYDROCHLORIDE 2.5 MILLIGRAM(S): 5 TABLET ORAL at 12:42

## 2023-01-01 RX ADMIN — Medication 975 MILLIGRAM(S): at 02:48

## 2023-01-01 RX ADMIN — Medication 975 MILLIGRAM(S): at 17:29

## 2023-01-01 RX ADMIN — APIXABAN 5 MILLIGRAM(S): 2.5 TABLET, FILM COATED ORAL at 05:48

## 2023-01-01 RX ADMIN — Medication 975 MILLIGRAM(S): at 17:05

## 2023-01-01 RX ADMIN — LOSARTAN POTASSIUM 25 MILLIGRAM(S): 100 TABLET, FILM COATED ORAL at 05:14

## 2023-01-01 RX ADMIN — MORPHINE SULFATE 1 MILLIGRAM(S): 50 CAPSULE, EXTENDED RELEASE ORAL at 06:36

## 2023-01-01 RX ADMIN — Medication 250 MICROGRAM(S): at 13:02

## 2023-01-01 RX ADMIN — Medication 975 MILLIGRAM(S): at 17:41

## 2023-01-01 RX ADMIN — Medication 975 MILLIGRAM(S): at 13:31

## 2023-01-01 RX ADMIN — APIXABAN 5 MILLIGRAM(S): 2.5 TABLET, FILM COATED ORAL at 17:30

## 2023-01-01 RX ADMIN — OXYCODONE HYDROCHLORIDE 2.5 MILLIGRAM(S): 5 TABLET ORAL at 06:02

## 2023-01-01 RX ADMIN — SODIUM CHLORIDE 75 MILLILITER(S): 9 INJECTION INTRAMUSCULAR; INTRAVENOUS; SUBCUTANEOUS at 15:32

## 2023-01-01 RX ADMIN — ROSUVASTATIN CALCIUM 40 MILLIGRAM(S): 5 TABLET ORAL at 21:04

## 2023-01-01 RX ADMIN — CHLORHEXIDINE GLUCONATE 1 APPLICATION(S): 213 SOLUTION TOPICAL at 11:23

## 2023-01-01 RX ADMIN — OXYCODONE HYDROCHLORIDE 2.5 MILLIGRAM(S): 5 TABLET ORAL at 12:49

## 2023-01-01 RX ADMIN — MORPHINE SULFATE 1 MILLIGRAM(S): 50 CAPSULE, EXTENDED RELEASE ORAL at 14:30

## 2023-01-01 RX ADMIN — OXYCODONE HYDROCHLORIDE 2.5 MILLIGRAM(S): 5 TABLET ORAL at 17:22

## 2023-01-01 RX ADMIN — Medication 975 MILLIGRAM(S): at 06:00

## 2023-01-01 RX ADMIN — RIVAROXABAN 15 MILLIGRAM(S): KIT at 17:37

## 2023-01-01 RX ADMIN — Medication 250 MICROGRAM(S): at 21:42

## 2023-01-01 RX ADMIN — OXYCODONE HYDROCHLORIDE 2.5 MILLIGRAM(S): 5 TABLET ORAL at 16:18

## 2023-01-01 RX ADMIN — Medication 975 MILLIGRAM(S): at 18:57

## 2023-01-01 RX ADMIN — Medication 975 MILLIGRAM(S): at 18:16

## 2023-01-01 RX ADMIN — MORPHINE SULFATE 1 MILLIGRAM(S): 50 CAPSULE, EXTENDED RELEASE ORAL at 16:47

## 2023-01-01 RX ADMIN — APIXABAN 5 MILLIGRAM(S): 2.5 TABLET, FILM COATED ORAL at 05:05

## 2023-01-01 RX ADMIN — MORPHINE SULFATE 1 MILLIGRAM(S): 50 CAPSULE, EXTENDED RELEASE ORAL at 03:15

## 2023-01-01 RX ADMIN — OXYCODONE HYDROCHLORIDE 2.5 MILLIGRAM(S): 5 TABLET ORAL at 00:39

## 2023-01-01 RX ADMIN — APIXABAN 5 MILLIGRAM(S): 2.5 TABLET, FILM COATED ORAL at 05:58

## 2023-01-01 RX ADMIN — CHLORHEXIDINE GLUCONATE 1 APPLICATION(S): 213 SOLUTION TOPICAL at 11:08

## 2023-01-01 RX ADMIN — Medication 975 MILLIGRAM(S): at 11:05

## 2023-01-01 RX ADMIN — Medication 975 MILLIGRAM(S): at 12:03

## 2023-01-01 RX ADMIN — Medication 975 MILLIGRAM(S): at 17:03

## 2023-01-01 RX ADMIN — Medication 25 MILLIGRAM(S): at 05:59

## 2023-01-31 PROBLEM — I10 ESSENTIAL (PRIMARY) HYPERTENSION: Chronic | Status: ACTIVE | Noted: 2022-03-10

## 2023-02-24 NOTE — ED ADULT TRIAGE NOTE - MEANS OF ARRIVAL
Referral placed.    Delphine Ronquillo MD  Internal Medicine/Pediatrics  [facility]       stretcher

## 2023-02-24 NOTE — CONSULT NOTE ADULT - ATTENDING COMMENTS
Above was discussed with patient/family, who expresses understanding. Medical issues needing to be addressed include: Cerebral infarction w/ cytotoxic cerebral edema, hx of Afib on Xarelto, CAD, HTN, R ankle fracture and pelvic fracture. CT (-), CTA ww/ ~45% stenosis of L ICA. L corona radiata/bg stroke. It is possible this could be small vessel/risk factor related given somewhat stuttering course - but would not rule out embolic possibility. Given pelvic hematoma and acute stroke, ok to hold Xarelto - from stroke perspective would be ok resuming Monday, if ok from hematoma perspective. In the mean time, would recommend CT CAP to r/o malignancy. Stroke w up pending. Statin if ok from liver fxn standpoint. Exam w/ 2/5 RLE, 3/5 RUE, facial droop, mild dysarthria. PT/OT/ST.     Case discussed with pt's son Freddy who is a physician. Above was discussed with patient/family, who expresses understanding. Medical issues needing to be addressed include: Cerebral infarction w/ cytotoxic cerebral edema, hx of Afib on Xarelto, CAD, HTN, R ankle fracture and pelvic fracture. CT (-), CTA ww/ ~45% stenosis of L ICA. L corona radiata/bg stroke. It is possible this could be small vessel/risk factor related given location and somewhat stuttering course - but would not rule out embolic possibility considering size and thrombocytosis. With pelvic hematoma and acute stroke, ok to hold Xarelto - from stroke perspective would be ok resuming Monday, if ok from hematoma perspective. In the mean time, would recommend CT CAP to r/o malignancy. Stroke labs and echo pending. Statin if ok from liver fxn standpoint. Exam w/ 2/5 RLE, 3/5 RUE, facial droop, mild dysarthria. PT/OT/ST.     Case discussed with pt's son Freddy who is a physician.

## 2023-02-24 NOTE — CONSULT NOTE ADULT - SUBJECTIVE AND OBJECTIVE BOX
Neurology - Consult Note    -  Spectra: [phone] ([facility]), [phone] ([facility])  -    HPI: Patient SHEY HINOJOSA is a 89y (02-Dec-1933) woman with afib on Xarelto, CAD, htn, R ankle fx 2022 admitted for Pubic rami fracture after mechanical fall. Patient last took xarelto last night 2/23. Patient reported having right facial droop and RUE weakness while in ED. Weakness and facial droop improved over the next 20 minutes. Patient has significant pain to hip R>L due to fall. Denies numbness, changes to speech changes to vison, headache. At baseline, patient is able to ambulate unassisted.     LKN 2:10 pm 2/24  NIHSS 8  mRS 1      Review of Systems:  All other review of systems is negative unless indicated above.    Allergies:      PMHx/PSHx/Family Hx: As above, otherwise see below   HTN (hypertension)    Hyperlipidemia    CAD (coronary artery disease)    MVP (mitral valve prolapse)    HTN (hypertension)        Social Hx:  No current use of tobacco, alcohol, or illicit drugs    Medications:  MEDICATIONS  (STANDING):    MEDICATIONS  (PRN):      Vitals:  T(C): 36.6 (02-24-23 @ 09:43), Max: 36.6 (02-24-23 @ 09:43)  HR: 82 (02-24-23 @ 14:40) (77 - 82)  BP: 106/82 (02-24-23 @ 14:40) (106/82 - 189/89)  RR: 20 (02-24-23 @ 14:40) (16 - 20)  SpO2: 100% (02-24-23 @ 14:40) (99% - 100%)    Physical Examination:   General - NAD  Cardiovascular - Peripheral pulses palpable, no edema    Neurologic Exam:  Mental status - Awake, Alert, Oriented to person, place, and time. Speech fluent, repetition and naming intact. Follows simple and complex commands.   Cranial nerves - PERRL, VFF, EOMI, face sensation (V1-V3) intact b/l, Right nasal labial fold flattening, hearing intact b/l, palate with symmetric elevation,  sternocleidomastiod 5/5 strength b/l, tongue midline on protrusion with full lateral movement    Motor - Normal bulk and tone throughout. Mild  pronator drift right   Strength testing            Deltoid      Biceps      Triceps     Wrist Extension         Wrist Flexion                     R            5                 5               5                     5                              5                        5                   L             5                 5               5                     5                              5                        5                     Testing of LE limited due to pain. Patient able to move LE antigravity, immediately falls to bed.    Sensation - Light touch/temperature intact throughout        Coordination - Finger to Nose intact b/l. No tremors appreciated    Gait and station - unable to ambulate     Labs:                        13.6   15.56 )-----------( 158      ( 24 Feb 2023 10:57 )             40.8     02-24    140  |  101  |  22  ----------------------------<  96  4.1   |  24  |  0.94    Ca    10.0      24 Feb 2023 10:57    TPro  7.7  /  Alb  4.4  /  TBili  0.5  /  DBili  x   /  AST  38  /  ALT  34  /  AlkPhos  195<H>  02-24    CAPILLARY BLOOD GLUCOSE  99 (24 Feb 2023 15:23)      POCT Blood Glucose.: 99 mg/dL (24 Feb 2023 14:38)    LIVER FUNCTIONS - ( 24 Feb 2023 10:57 )  Alb: 4.4 g/dL / Pro: 7.7 g/dL / ALK PHOS: 195 U/L / ALT: 34 U/L / AST: 38 U/L / GGT: x             PT/INR - ( 24 Feb 2023 10:57 )   PT: 16.2 sec;   INR: 1.40 ratio         PTT - ( 24 Feb 2023 10:57 )  PTT:35.2 sec        Radiology:     Neurology - Consult Note    -  Spectra: [phone] ([facility]), [phone] ([facility])  -    HPI: Patient SHEY HINOJOSA is a 89y (02-Dec-1933) woman with afib on Xarelto, CAD, htn, R ankle fx 2022 admitted for Pubic rami fracture after mechanical fall. Patient last took xarelto last night 2/23. Patient reported having right facial droop and RUE weakness while in ED. Weakness and facial droop improved over the next 20 minutes. Patient has significant pain to hip R>L due to fall. Denies numbness, changes to speech changes to vison, headache. At baseline, patient is able to ambulate unassisted.     LKN 2:10 pm 2/24  NIHSS 8  ABCD2 score 4  mRS 1      Review of Systems:  All other review of systems is negative unless indicated above.    Allergies:      PMHx/PSHx/Family Hx: As above, otherwise see below   HTN (hypertension)    Hyperlipidemia    CAD (coronary artery disease)    MVP (mitral valve prolapse)    HTN (hypertension)        Social Hx:  No current use of tobacco, alcohol, or illicit drugs    Medications:  MEDICATIONS  (STANDING):    MEDICATIONS  (PRN):      Vitals:  T(C): 36.6 (02-24-23 @ 09:43), Max: 36.6 (02-24-23 @ 09:43)  HR: 82 (02-24-23 @ 14:40) (77 - 82)  BP: 106/82 (02-24-23 @ 14:40) (106/82 - 189/89)  RR: 20 (02-24-23 @ 14:40) (16 - 20)  SpO2: 100% (02-24-23 @ 14:40) (99% - 100%)    Physical Examination:   General - NAD  Cardiovascular - Peripheral pulses palpable, no edema    Neurologic Exam:  Mental status - Awake, Alert, Oriented to person, place, and time. Speech fluent, repetition and naming intact. Follows simple and complex commands.   Cranial nerves - PERRL, VFF, EOMI, face sensation (V1-V3) intact b/l, Right nasal labial fold flattening, hearing intact b/l, palate with symmetric elevation,  sternocleidomastiod 5/5 strength b/l, tongue midline on protrusion with full lateral movement    Motor - Normal bulk and tone throughout. Mild  pronator drift right   Strength testing    On initial assessment:   RUE antigravity with drift, does not fall to bed  LUE no drift   B/L LE symmetric, able to move toes, does not go antigravity, limited by pain    Upon reassessment:             Deltoid      Biceps      Triceps     Wrist Extension         Wrist Flexion                     R            5                 5               5                     5                              5                        5                   L             5                 5               5                     5                              5                        5                     Testing of LE limited due to pain. Patient able to move LE antigravity, immediately falls to bed.    Sensation - Light touch/temperature intact throughout        Coordination - Finger to Nose intact b/l. No tremors appreciated    Gait and station - unable to ambulate     Labs:                        13.6   15.56 )-----------( 158      ( 24 Feb 2023 10:57 )             40.8     02-24    140  |  101  |  22  ----------------------------<  96  4.1   |  24  |  0.94    Ca    10.0      24 Feb 2023 10:57    TPro  7.7  /  Alb  4.4  /  TBili  0.5  /  DBili  x   /  AST  38  /  ALT  34  /  AlkPhos  195<H>  02-24    CAPILLARY BLOOD GLUCOSE  99 (24 Feb 2023 15:23)      POCT Blood Glucose.: 99 mg/dL (24 Feb 2023 14:38)    LIVER FUNCTIONS - ( 24 Feb 2023 10:57 )  Alb: 4.4 g/dL / Pro: 7.7 g/dL / ALK PHOS: 195 U/L / ALT: 34 U/L / AST: 38 U/L / GGT: x             PT/INR - ( 24 Feb 2023 10:57 )   PT: 16.2 sec;   INR: 1.40 ratio         PTT - ( 24 Feb 2023 10:57 )  PTT:35.2 sec        Radiology:  < from: CT Brain Stroke Protocol (02.24.23 @ 15:11) >  IMPRESSION:    CT brain:  No hydrocephalus, acute intracranial hemorrhage, mass effect, or brain   edema.    CT brain perfusion:  No perfusion abnormality    CTA brain:  No flow-limiting stenosis or vascular aneurysm. No AVM.    CTA neck:  Approximately 45% short segmentstenosis of the origin left internal   carotid artery due to the presence of partially calcified plaque. Normal   flow-related enhancement of the more distal left ICA.    No flow-limiting stenosis, evidence for arterial dissection, or vascular   aneurysm.

## 2023-02-24 NOTE — H&P ADULT - NSICDXPASTMEDICALHX_GEN_ALL_CORE_FT
PAST MEDICAL HISTORY:  CAD (coronary artery disease) Non-obstructive- Had an angiogram years ago    Chronic atrial fibrillation     HTN (hypertension)     HTN (hypertension)     Hyperlipidemia     MVP (mitral valve prolapse)

## 2023-02-24 NOTE — ED PROVIDER NOTE - NSICDXPASTMEDICALHX_GEN_ALL_CORE_FT
PAST MEDICAL HISTORY:  CAD (coronary artery disease) Non-obstructive- Had an angiogram years ago    HTN (hypertension)     HTN (hypertension)     Hyperlipidemia     MVP (mitral valve prolapse)

## 2023-02-24 NOTE — H&P ADULT - ASSESSMENT
December 13, 2022      [facility]  [address]  [place] [address]  Phone: [phone]  Fax: [phone]       Patient: Feliberto Bhakta   YOB: 1989  Date of Visit: 12/13/2022    To Whom It May Concern:    Candelaria Bhakta  was at [facility] on 12/13/2022. The patient may return to work when he is afebrile for 24 hours without the use of fever reducing medications (e.g. Tylenol, Motrin). If you have any questions or concerns, or if I can be of further assistance, please do not hesitate to contact me.    Sincerely,      Yvette De La Cruz MD     
Patient is an 89 year old female from home AAO x3, with PMH of Afib on xarelto, CAD, HTN, HLD and history of right ankle fracture s/p repair 9 months ago, who presented to the ED s/p fall at home.     WBC of 15K. Xray noted to show acute fractures of bilateral pubic rami. CT pelvis done showing acute fracture of superior and inferior pubic rami and right sacral ala. Hematoma in right lower pelvis. CXR reviewed; appears clear. Code stroke called in ED due to facial droop and right UE weakness. CT head, CTA brain and neck done with no acute abnormalities.

## 2023-02-24 NOTE — H&P ADULT - PROBLEM SELECTOR PLAN 6
- xarelto for AC [Alert] : alert [Acute Distress] : no acute distress [Normocephalic] : not normocephalic [Flat Open Anterior [place]] : flat open anterior fontanelle [PERRL] : PERRL [Red Reflex Bilateral] : red reflex bilateral [Normally Placed Ears] : normally placed ears [Auricles Well Formed] : auricles well formed [Clear Tympanic membranes] : clear tympanic membranes [Light reflex present] : light reflex present [Bony landmarks visible] : bony landmarks visible [Discharge] : no discharge [Nares Patent] : nares patent [Palate Intact] : palate intact [Uvula Midline] : uvula midline [Supple, full passive range of motion] : supple, full passive range of motion [Palpable Masses] : no palpable masses [Symmetric Chest Rise] : symmetric chest rise [Clear to Auscultation Bilaterally] : clear to auscultation bilaterally [Regular Rate and Rhythm] : regular rate and rhythm [S1, S2 present] : S1, S2 present [Murmurs] : no murmurs [+2 Femoral Pulses] : +2 femoral pulses [Soft] : soft [Tender] : nontender [Distended] : not distended [Bowel Sounds] : bowel sounds present [Hepatomegaly] : no hepatomegaly [Splenomegaly] : no splenomegaly [Normal external genitailia] : normal external genitalia [Clitoromegaly] : no clitoromegaly [Patent Vagina] : vagina patent [Normally Placed] : normally placed [No Abnormal Lymph Nodes Palpated] : no abnormal lymph nodes palpated [Samson-Ortolani] : negative Samson-Ortolani [Symmetric Flexed Extremities] : symmetric flexed extremities [Spinal Dimple] : no spinal dimple [Tuft of Hair] : no tuft of hair [Startle Reflex] : startle reflex present [Suck Reflex] : suck reflex present [Rooting] : rooting reflex present [Palmar Grasp] : palmar grasp reflex present [Plantar Grasp] : plantar grasp reflex present [Symmetric V[place]ha] : symmetric [place] [Rash and/or lesion present] : no rash/lesion [FreeTextEntry2] : + mild plagiocephaly

## 2023-02-24 NOTE — ED ADULT NURSE NOTE - OBJECTIVE STATEMENT
89 yr old female to ED via EMS with H/O afib on Xarelto , CAD , rt ankle fx x 9 months ago to ED from home after RLE became weak and pt fell.  Denies head inj Denies LOC . Denies dizziness or lightheadedness.   Patient reports that she has baseline RLE weakness after the surgery. Denies chest pain or sob Denies abd pain Denies N/V. Moving extremities with worsening pain RT hip Pos sensation to extremities Denies numbness. Cap refill WNL. GCS 15 Responds approp to verbal and tactile stimuli

## 2023-02-24 NOTE — ED PROVIDER NOTE - PHYSICAL EXAMINATION
Gen: Patient is well-appearing, NAD, AAOx3  HEENT: NCAT, EOMI, PERRLA, normal conjunctiva, tongue midline, oral mucosa moist, neck is supple, no midline tenderness   Lung: CTAB, no respiratory distress, no wheezes/rhonchi/rales B/L, speaking in full sentences  CV: RRR, no murmurs, rubs or gallops, distal pulses 2+ b/l  Abd: soft, NT, ND, no guarding, no rigidity, no rebound tenderness, no CVA tenderness   MSK: no visible deformities, ROM of LEs limited due to pain, in UE/LE, no back TTP  Neuro: No focal sensory or motor deficits, normal CN exam, normal coordination   Skin: Warm, well perfused, no leg swelling  Psych: normal affect, calm

## 2023-02-24 NOTE — ED PROVIDER NOTE - OBJECTIVE STATEMENT
89 y F, hx of afib on Xarelto, CAD, htn, R ankle fx s/p repaired 9 months ago, coming from home, lives by herself, presenting with 1-day onset of R hip pain after a mechanical fall. Patient reports that she has baseline RLE weakness after the surgery. Her RLE gave away as she was bending over to grab some items from the drawer and she fell on her R side. Denies head injury, LOC, headache, vision changes, chest pain, shortness of breath, abdominal pain. PMD: Dr. Larissa Beal.

## 2023-02-24 NOTE — H&P ADULT - PROBLEM SELECTOR PLAN 5
- patient on metoprolol and losartan at home  - will hold for now to allow for permissive HTN up to 220/120 mmHg for first 24 hours after symptom onset followed by gradual normotension per neuro recs   - monitor BP and resume BP meds tomorrow after 2:10pm

## 2023-02-24 NOTE — ED ADULT NURSE REASSESSMENT NOTE - NS ED NURSE REASSESS COMMENT FT1
pt family member states 'my mom is having weakness in her right arm'. Neuro exam performed on patient, neuro completely intact, no focal deficits, pt able to hold both arms out in front of body and keep arms up, no drift. no facial droop, no slurring of words. pt received morphine for pain and reports feeling 'groggy' from pain medication. plan of care explained to family member and patient, VSS, resting comfortably. MD aware.

## 2023-02-24 NOTE — H&P ADULT - PROBLEM SELECTOR PLAN 4
- hold home med of metoprolol for now to allow for permissive HTN  - resume home metoprolol after 2:10 on 2/25  - resume home xarelto at 15mg instead of 20mg due to age and renal function per pharmacy recs

## 2023-02-24 NOTE — H&P ADULT - NSHPPOAPULMEMBOLUS_GEN_A_CORE
Onset: ~ 5-10 minutes ago    Location / description: pt states having pizza last night from a gas station around 9 pm. Pt woke up around 1:30 am with indigestion. Pt then had diarrhea. About 5-10 minutes ago pt started vomiting. Pt spit afterwards and had spit with blood in it. Pt is unsure if there was blood in the vomit. Pt states having chills and feeling warm. Pt has some abdominal pain.    Pain Scale (1 - 10), 10 highest: 2-4/10 constant and moving up    Recent Care: 4/16/18 OV: health exam     Reason for Disposition  • Weak, dizzy or lightheaded    Protocols used: VOMITING OF BLOOD-A-AH       no

## 2023-02-24 NOTE — H&P ADULT - PROBLEM SELECTOR PLAN 3
- patient was noted to have right facial droop and right UE weakness around 2:10pm 2/24  - Code stroke called; CT head and CTA done with no acute abnormalities  - symptoms currently resolved; ?TIA  - neuro recs appreciated; MRI brain ordered; ECHO ordered   - f/u A1c and lipid profile   - passed bedside dysphagia; start regular diet   - neuro checks   - allow for permissive HTN up to 220/120 mmHg for first 24 hours after symptom onset followed by gradual normotension; hold losartan and metoprolol for now   - resume home xarelto at 15mg instead of 20mg due to age and renal function per pharmacy recs

## 2023-02-24 NOTE — H&P ADULT - PROBLEM SELECTOR PLAN 1
- patient presented s/p fall  - xrays and CT pelvis done showing acute fractures of pubic rami   - ortho on board; no plans for acute intervention at this time  - pain control with tylenol 975 q6 ATC for now; IV morphine PRN for severe pain   - PT eval   - fall precautions

## 2023-02-24 NOTE — H&P ADULT - NSHPADDITIONALINFOADULT_GEN_ALL_CORE
Discussed with patient and daughter at bedside. Discussed with patient and daughter at bedside.  Discussed with ACPBrice

## 2023-02-24 NOTE — ED PROVIDER NOTE - CLINICAL SUMMARY MEDICAL DECISION MAKING FREE TEXT BOX
89 y F, hx of afib on Xarelto, p/w R hip pain after a mechanical fall. No head injury. VSWNL. PE as noted above, notable for limited ROM of hips bilaterally. Head is atraumatic. Will get xray to r/o fx, labs, pain control. 89 y F lives alone , hx of afib on Xarelto, p/w R hip pain after a mechanical fall. No head injury.  tenderness  over both pelvic area  and for limited ROM of hips bilaterally. Head is atraumatic. Will get xray to r/o fx, labs, pain control.   social service  ,reassess ZR

## 2023-02-24 NOTE — ED ADULT NURSE REASSESSMENT NOTE - NS ED NURSE REASSESS COMMENT FT1
Pt improved since sx started Talking Slurred speech and facial droop reserved Moving RUE Neuro at bedside CT done

## 2023-02-24 NOTE — ED ADULT NURSE REASSESSMENT NOTE - NS ED NURSE REASSESS COMMENT FT1
To 4MONTI Pt awake alert and orientedx4 Resp even and nonlab Denies acute discomfort Responds approp to verbal and tactile stimuli Daughter at bedside

## 2023-02-24 NOTE — CONSULT NOTE ADULT - ASSESSMENT
Patient SHEY HINOJOSA is a 89y (02-Dec-1933) woman with afib on Xarelto, CAD, htn, R ankle fx 2022 admitted for Pubic rami fracture after mechanical fall. Patient last took xarelto last night 2/23. Patient reported having right facial droop and RUE weakness while in ED. Weakness and facial droop improved over the next 20 minutes. Patient has significant pain to hip R>L due to fall. Denies numbness, changes to speech changes to vison, headache. At baseline, patient is able to ambulate unassisted.  Patient SHEY HINOJOSA is a 89y (02-Dec-1933) woman with afib on Xarelto, CAD, htn, R ankle fx 2022 admitted for Pubic rami fracture after mechanical fall. Patient last took xarelto last night 2/23. Patient reported having right facial droop and RUE weakness while in ED. Weakness and facial droop improved over the next 20 minutes. Patient has significant pain to hip R>L due to fall. Denies numbness, changes to speech changes to vison, headache. At baseline, patient is able to ambulate unassisted. 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    Impression: Transient Right upper extremity weakness and Right facial palsy from Left brain dysfunction possibly due to TIA    Recommendation:    Imaging/Labs  [] MRI brain w/o  [] TTE   [] HbA1C and Lipid Panel    Meds  [] Can continue Xarelto from neurological perspective, if no contraindications from ortho   [] Atorvastatin 80MG QHS, titrate to LDL<70    Other  [] Telemonitoring; Neurochecks and vital signs per unit protocol, Q4H  [] Permissive HTN up to 220/120 mmHg for first 24 hours after symptom onset followed by gradual normotension.   [] BG goal <180, avoid hypoglycemia  [] NPO until clears dysphagia screen, otherwise swallow evaluation  [] Fall, aspiration precautions  [] PT/OT eval    Discussed with Dr. Serrano undersupervision of stroke attending.  Patient SHEY HINOJOSA is a 89y (02-Dec-1933) woman with afib on Xarelto, CAD, htn, R ankle fx 2022 admitted for Pubic rami fracture after mechanical fall. Patient last took xarelto last night 2/23. Patient reported having right facial droop and RUE weakness while in ED. Weakness and facial droop improved over the next 20 minutes. Patient has significant pain to hip R>L due to fall. Denies numbness, changes to speech changes to vison, headache. At baseline, patient is able to ambulate unassisted. 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    Not Tenecteplase candidate due abnormal coagulation factor due to xarelto use and improving RUE weakness   Not thrombectomy candidate due to no LVO    Impression: Transient Right upper extremity weakness and Right facial palsy from Left brain dysfunction possibly due to TIA    Recommendation:    Imaging/Labs  [] MRI brain w/o  [] TTE   [] HbA1C and Lipid Panel    Meds  [] Can continue Xarelto from neurological perspective, if no contraindications from ortho   [] Atorvastatin 80MG QHS, titrate to LDL<70    Other  [] Telemonitoring; Neurochecks and vital signs per unit protocol, Q4H  [] Permissive HTN up to 220/120 mmHg for first 24 hours after symptom onset followed by gradual normotension.   [] BG goal <180, avoid hypoglycemia  [] NPO until clears dysphagia screen, otherwise swallow evaluation  [] Fall, aspiration precautions  [] PT/OT eval    Discussed with Dr. Serrano undersupervision of stroke attending.

## 2023-02-24 NOTE — H&P ADULT - HISTORY OF PRESENT ILLNESS
Patient is an 89 year old female from home AAO x3, with PMH of Afib on xarelto, CAD, HTN, HLD and history of right ankle fracture s/p repair 9 months ago, who presented to the ED s/p fall at home. Patient states that earlier this morning, she was crouching down to  something and her right leg gave out and patient had fallen. Patient unable to get up and was afraid she would fall again if she got up. States she had fallen mainly on her right side. Denies any LOC or hitting head. States that she has been having problems with her leg ever since she had her ankle surgery about 9 months ago. Denies any pain in LLE.     Of note, patient was noted to have right upper extremity weakness and facial droop. Patient was unaware of facial droop but daughter at bedside confirmed noticing right facial droop with some slurred speech. Denies any confusion or LOC at the time. Symptoms all currently resolved. Denies any history of symptoms similar in the past. Denies any history of CVA. History of CVA in her mother.

## 2023-02-24 NOTE — ED ADULT NURSE REASSESSMENT NOTE - NS ED NURSE REASSESS COMMENT FT1
break coverage RN from 1344 - 1434: left sided facial droop noted at 1430 precisely and code stroke called at 1430. pt did not have facial droop previously, did not have any neurological symptoms previously. pt reported weakness and 'grogginess' after receiving morphine for right arm pain, and previous intact and normal neuro exam. symptoms of facial droop presented at 1430. bilateral arm weakness noted. finger stick 99. MD, RN, and family at bedside. pt currently in CT scan with neurology at bedside. Symptoms currently resolving in CT scan. Will continue to monitor and assess while offering support and reassurance.

## 2023-02-24 NOTE — CONSULT NOTE ADULT - SUBJECTIVE AND OBJECTIVE BOX
HPI  89yFemale s/p R ankle ORIF (March 2022, Dr. Parra) w/ some residual subjective RLE weakness c/o b/l groin pain s/p mechanical fall this AM. Pt states she's comfortable at rest, only has pain when moving. Afraid to attempt bearing weight in either LE since the fall. Denies headstrike or LOC. Denies numbness/tingling in the RLE. Denies any other trauma/injuries at this time. At baseline, community ambulator w/o assistive devices.    ROS  Negative unless otherwise specified in HPI.    PAST MEDICAL & SURGICAL Hx  PAST MEDICAL & SURGICAL HISTORY:  HTN (hypertension)      Hyperlipidemia      CAD (coronary artery disease)  Non-obstructive- Had an angiogram years ago      MVP (mitral valve prolapse)      HTN (hypertension)      No significant past surgical history          MEDICATIONS  Home Medications:  acetaminophen 325 mg oral tablet: 3 tab(s) orally every 8 hours (30 Jul 2022 15:00)  aspirin 81 mg oral delayed release tablet: 1 tab(s) orally once a day (30 Jul 2022 15:00)  Aspirin Enteric Coated 81 mg oral delayed release tablet: 1 tab(s) orally once a day (05 Apr 2021 11:45)  atorvastatin 40 mg oral tablet: 1 tab(s) orally once a day (at bedtime) (30 Jul 2022 15:00)  enoxaparin: 40 milligram(s) subcutaneous once a day x 6 weeks total for dvt prevention (30 Jul 2022 15:00)  Lipitor 40 mg oral tablet: 1 tab(s) orally once a day (05 Apr 2021 11:55)  lisinopril 10 mg oral tablet: 1 tab(s) orally 2 times a day (05 Apr 2021 11:46)  losartan 25 mg oral tablet: 1 tab(s) orally once a day (30 Jul 2022 15:00)  metoprolol succinate 25 mg oral tablet, extended release: 1 tab(s) orally once a day (30 Jul 2022 15:00)  oxyCODONE 5 mg oral tablet: 0.5-1 tab(s) orally every 6 hours, As needed, moderate to Severe Pain (30 Jul 2022 15:00)  polyethylene glycol 3350 oral powder for reconstitution: 17 gram(s) orally once a day, As needed, Constipation (30 Jul 2022 15:00)  senna oral tablet: 2 tab(s) orally once a day (at bedtime) (30 Jul 2022 15:00)    ALLERGIES  No Known Allergies    VITALS  Vital Signs Last 24 Hrs  T(C): 36.6 (24 Feb 2023 09:43), Max: 36.6 (24 Feb 2023 09:43)  T(F): 97.9 (24 Feb 2023 09:43), Max: 97.9 (24 Feb 2023 09:43)  HR: 77 (24 Feb 2023 09:43) (77 - 77)  BP: 189/89 (24 Feb 2023 09:43) (189/89 - 189/89)  RR: 16 (24 Feb 2023 09:43) (16 - 16)  SpO2: 99% (24 Feb 2023 09:43) (99% - 99%)  Parameters below as of 24 Feb 2023 09:43  Patient On (Oxygen Delivery Method): room air    PHYSICAL EXAM  Gen: Lying in bed, NAD  Resp: No increased WOB  RLE:  Skin intact, ankle incisions well healed  +Mild TTP over R groin, no TTP along remainder of extremity; compartments soft  (-) Log roll test  No pain with axial loading  Motor: TA/EHL/GS/FHL intact  Sensory: DP/SP/Tib/Mariya/Saph SILT  +DP pulse, WWP    LLE:  Skin intact  No TTP over R groin, no TTP along remainder of extremity; compartments soft  (-) Log roll test  No pain with axial loading  Motor: TA/EHL/GS/FHL intact  Sensory: DP/SP/Tib/Mariya/Saph SILT  +DP pulse, WWP    Secondary survey:  No TTP along spine or other extremities, pelvis grossly stable, SILT and compartments soft throughout    LABS                        13.6   15.56 )-----------( 158      ( 24 Feb 2023 10:57 )             40.8     02-24    140  |  101  |  22  ----------------------------<  96  4.1   |  24  |  0.94    Ca    10.0      24 Feb 2023 10:57    TPro  7.7  /  Alb  4.4  /  TBili  0.5  /  DBili  x   /  AST  38  /  ALT  34  /  AlkPhos  195<H>  02-24    PT/INR - ( 24 Feb 2023 10:57 )   PT: 16.2 sec;   INR: 1.40 ratio    PTT - ( 24 Feb 2023 10:57 )  PTT:35.2 sec    IMAGING  XRs: R puboacetabular junction fx, R inferior pubic ramus fx, L superior/inferior pubic ramus fxs    ASSESSMENT & PLAN  89yFemale w/ stable pelvic ring injury: R puboacetabular junction fx, R inferior pubic ramus fx, L superior/inferior pubic ramus fxs  -WBAT RLE  -pain control  -ice/cold compress  -PT/OT  -no acute ortho surgery at this time  -f/u outpt with Dr. Parra in 1 week, call office for appt HPI  89yFemale s/p R ankle ORIF (March 2022, Dr. Parra) w/ some residual subjective RLE weakness c/o b/l groin pain s/p mechanical fall this AM. Pt states she's comfortable at rest, only has pain when moving. Afraid to attempt bearing weight in either LE since the fall. Denies headstrike or LOC. Denies numbness/tingling in the RLE. Denies any other trauma/injuries at this time. At baseline, community ambulator w/o assistive devices.    ROS  Negative unless otherwise specified in HPI.    PAST MEDICAL & SURGICAL Hx  PAST MEDICAL & SURGICAL HISTORY:  HTN (hypertension)      Hyperlipidemia      CAD (coronary artery disease)  Non-obstructive- Had an angiogram years ago      MVP (mitral valve prolapse)      HTN (hypertension)      No significant past surgical history          MEDICATIONS  Home Medications:  acetaminophen 325 mg oral tablet: 3 tab(s) orally every 8 hours (30 Jul 2022 15:00)  aspirin 81 mg oral delayed release tablet: 1 tab(s) orally once a day (30 Jul 2022 15:00)  Aspirin Enteric Coated 81 mg oral delayed release tablet: 1 tab(s) orally once a day (05 Apr 2021 11:45)  atorvastatin 40 mg oral tablet: 1 tab(s) orally once a day (at bedtime) (30 Jul 2022 15:00)  enoxaparin: 40 milligram(s) subcutaneous once a day x 6 weeks total for dvt prevention (30 Jul 2022 15:00)  Lipitor 40 mg oral tablet: 1 tab(s) orally once a day (05 Apr 2021 11:55)  lisinopril 10 mg oral tablet: 1 tab(s) orally 2 times a day (05 Apr 2021 11:46)  losartan 25 mg oral tablet: 1 tab(s) orally once a day (30 Jul 2022 15:00)  metoprolol succinate 25 mg oral tablet, extended release: 1 tab(s) orally once a day (30 Jul 2022 15:00)  oxyCODONE 5 mg oral tablet: 0.5-1 tab(s) orally every 6 hours, As needed, moderate to Severe Pain (30 Jul 2022 15:00)  polyethylene glycol 3350 oral powder for reconstitution: 17 gram(s) orally once a day, As needed, Constipation (30 Jul 2022 15:00)  senna oral tablet: 2 tab(s) orally once a day (at bedtime) (30 Jul 2022 15:00)    ALLERGIES  No Known Allergies    VITALS  Vital Signs Last 24 Hrs  T(C): 36.6 (24 Feb 2023 09:43), Max: 36.6 (24 Feb 2023 09:43)  T(F): 97.9 (24 Feb 2023 09:43), Max: 97.9 (24 Feb 2023 09:43)  HR: 77 (24 Feb 2023 09:43) (77 - 77)  BP: 189/89 (24 Feb 2023 09:43) (189/89 - 189/89)  RR: 16 (24 Feb 2023 09:43) (16 - 16)  SpO2: 99% (24 Feb 2023 09:43) (99% - 99%)  Parameters below as of 24 Feb 2023 09:43  Patient On (Oxygen Delivery Method): room air    PHYSICAL EXAM  Gen: Lying in bed, NAD  Resp: No increased WOB  RLE:  Skin intact, ankle incisions well healed  +Mild TTP over R groin, no TTP along remainder of extremity; compartments soft  (-) Log roll test  No pain with axial loading  Motor: TA/EHL/GS/FHL intact  Sensory: DP/SP/Tib/Mariya/Saph SILT  +DP pulse, WWP    LLE:  Skin intact  No TTP over R groin, no TTP along remainder of extremity; compartments soft  (-) Log roll test  No pain with axial loading  Motor: TA/EHL/GS/FHL intact  Sensory: DP/SP/Tib/Mariya/Saph SILT  +DP pulse, WWP    Secondary survey:  No TTP along spine or other extremities, pelvis grossly stable, SILT and compartments soft throughout    LABS                        13.6   15.56 )-----------( 158      ( 24 Feb 2023 10:57 )             40.8     02-24    140  |  101  |  22  ----------------------------<  96  4.1   |  24  |  0.94    Ca    10.0      24 Feb 2023 10:57    TPro  7.7  /  Alb  4.4  /  TBili  0.5  /  DBili  x   /  AST  38  /  ALT  34  /  AlkPhos  195<H>  02-24    PT/INR - ( 24 Feb 2023 10:57 )   PT: 16.2 sec;   INR: 1.40 ratio    PTT - ( 24 Feb 2023 10:57 )  PTT:35.2 sec    IMAGING  < from: CT Pelvis Bony Only No Cont (02.24.23 @ 12:20) >  ACC: [id number] EXAM:  CT 3D RECONSTRUCT CORDELIA HALL      ACC: [id number] EXAM:  CT PELVIS BONY ONLY      PROCEDURE DATE:  02/24/2023      INTERPRETATION:  EXAMINATION: CT of the pelvis without contrast    CLINICAL INFORMATION: Lower extremity pain after fall    TECHNIQUE: Axial CT images were obtained through the pelvis. Coronal and   sagittal reformatted images were made. 3-D reconstruction images were   also performed.    FINDINGS: There is an acute nondisplaced fracture of the right   puboacetabular junction and an acute mildly displaced comminuted fracture   of the right inferior pubic ramus. There is also an acute comminuted and   mildly displaced fracture of the medial aspect of the left superior pubic   ramus and an acute nondisplaced fracture of the left inferior pubic   ramus. There is an acute nondisplaced fracture of the right sacral ala.    There is lower lumbar spondylosis. There are no advanced arthritic   changes at the hips.    There is hematoma in the right lower pelvis with slight mass effect upon   the right lateral wall of the bladder.    There are vascular calcifications.     The 3-D reconstructed images confirm the above fractures.    IMPRESSION: Acute fractures of the superior and inferior pubic rami   bilaterally and of the right sacral ala.    Hematoma in the right lower pelvis with slight mass effect upon the right   lateral wall of the bladder.    --- End of Report ---      < end of copied text >      ASSESSMENT & PLAN  89yFemale w/ R LC1 injury and L sup/inf pubic rami fxs  -WBAT b/l LEs  -pain control  -ice/cold compress  -PT/OT  -no acute ortho surgery at this time  -f/u outpt with Dr. Parra in 1 week, call office for appt

## 2023-02-24 NOTE — ED PROVIDER NOTE - PROGRESS NOTE DETAILS
call to see a patient who became altered  with left  facial droop and  rt arm weakness ,code stroke called ,pt went to stat CT SCAN .wILL reassess ZR Olman PGY2: per neurology, no tenecteplase will be given because of abnormal INR/PT. Patient is symptomatically improving.

## 2023-02-24 NOTE — PATIENT PROFILE ADULT - FALL HARM RISK - HARM RISK INTERVENTIONS
Assistance with ambulation/Assistance OOB with selected safe patient handling equipment/Communicate Risk of Fall with Harm to all staff/Discuss with provider need for PT consult/Monitor gait and stability/Reinforce activity limits and safety measures with patient and family/Tailored Fall Risk Interventions/Visual Cue: Yellow wristband and red socks/Bed in lowest position, wheels locked, appropriate side rails in place/Call bell, personal items and telephone in reach/Instruct patient to call for assistance before getting out of bed or chair/Non-slip footwear when patient is out of bed/[place] to call system/Physically safe environment - no spills, clutter or unnecessary equipment/Purposeful Proactive Rounding/Room/bathroom lighting operational, light cord in reach

## 2023-02-25 NOTE — PROGRESS NOTE ADULT - ASSESSMENT
89y F with Hx afib (on Xarelto), presenting w/ acute R arm weakness      LKW: 9:25pm 2/24  NIHSS:  2  Baseline MRS: 0  Not a Teneceteplase candidate due to abnormal coag  Not a thrombectomy candidate due to lack of LVO    CT head: neg  CTA/P: neg    Impression:  acute R arm drift and R facial droop. NIHSS 2. Repeat CTH/A/P unremarkable. DDx ischemic stroke, TIA, toxic/metabolic/infectious    Recommendations:  [] c/w Xarelto  [] Atorvastatin 40-80 mg daily titrated per LDL < 70  [] HgbA1C, fasting lipid panel, CBC, CMP, coag panel, troponin  [] MRI brain w/o con (if not contraindicated)  [] TTE w/ bubble study  [] telemetry to check for arrhythmia, EKG, will discuss loop recorder    - Tight glucose control (long-term goal HgbA1c < 6%)  - Stroke education and counseling  - Neuro-checks and VS q4h  - Permissive HTN up to 220/120 for 24-48h from symptom onset  - Dysphagia screen. If fails, speech/swallow eval  - aspiration, fall precautions  - STAT CT head non-contrast for change in neuro exam.   - PT/ OT / DVT ppx per primary team     Discussed with stroke fellow Dr. Betsey Serrano      and under supervision of attending Dr. Lakeshia Wilson      regarding decision against candidacy for Tenecteplase / thrombectomy. Will be formally staffed on morning rounds with attending. Recommendations will be complete once signed by attending.   89y F with Hx afib (on Xarelto), presenting w/ acute R arm weakness      LKW: 9:25pm 2/24  NIHSS:  2  Baseline MRS: 0  Not a Teneceteplase candidate due to abnormal coag  Not a thrombectomy candidate due to lack of LVO    CT head: neg  CTA/P: neg    Impression:  acute R arm drift and R facial droop. NIHSS 2. Repeat CTH/A/P unremarkable. DDx ischemic stroke, TIA, toxic/metabolic/infectious    Recommendations:  [] Atorvastatin 40-80 mg daily titrated per LDL < 70  [] HgbA1C, fasting lipid panel, CBC, CMP, coag panel, troponin  [] MRI brain w/o con (if not contraindicated)  [] TTE w/ bubble study  [] telemetry to check for arrhythmia, EKG, will discuss loop recorder    - Tight glucose control (long-term goal HgbA1c < 6%)  - Stroke education and counseling  - Neuro-checks and VS q4h  - Permissive HTN up to 220/120 for 24-48h from symptom onset  - Dysphagia screen. If fails, speech/swallow eval  - aspiration, fall precautions  - STAT CT head non-contrast for change in neuro exam.   - PT/ OT / DVT ppx per primary team     Discussed with stroke fellow Dr. Betsey Serrano      and under supervision of attending Dr. Lakeshia Wilson      regarding decision against candidacy for Tenecteplase / thrombectomy. Will be formally staffed on morning rounds with attending. Recommendations will be complete once signed by attending.

## 2023-02-25 NOTE — STROKE CODE NOTE - MRS SCORE
(1) No significant disability. Able to carry out all usual activities, despite some symptoms.
(0) No symptoms

## 2023-02-25 NOTE — STROKE CODE NOTE - NIH STROKE SCALE: 6B. MOTOR LEG, RIGHT, QM
(0) No drift; leg holds 30 degree position for full 5 secs
(2) Some effort against gravity; leg falls to bed by 5 secs, but has some effort against gravity

## 2023-02-25 NOTE — PHYSICAL THERAPY INITIAL EVALUATION ADULT - PHYSICAL ASSIST/NONPHYSICAL ASSIST: SUPINE/SIT, REHAB EVAL
IUD Removal:  SUBJECTIVE:      Shawna Carrillo is a 29 ADALBERTO Edge CNM        SUBJECTIVE:                                                   Shawna Carrillo is a 29 Wilber Banegas, DNP, APRN, CNM         verbal cues/nonverbal cues (demo/gestures)/1 person assist

## 2023-02-25 NOTE — PHYSICAL THERAPY INITIAL EVALUATION ADULT - ADDITIONAL COMMENTS
Pt lives alone in elevator access apartment; pt independent prior to admission with no AD. Pt owns RW, wheelchair and straight cane.

## 2023-02-25 NOTE — PHYSICAL THERAPY INITIAL EVALUATION ADULT - GENERAL OBSERVATIONS, REHAB EVAL
pt received semi-supine in bed in NAD +tele, IV lock, z-flow boots, family at bedside, s/p code stroke overnight for R side arm weakness and facial droop

## 2023-02-25 NOTE — CONSULT NOTE ADULT - SUBJECTIVE AND OBJECTIVE BOX
Neurology - Consult Note    -  Spectra: [phone] ([facility]), [phone] ([facility])  -    HPI: Patient SHEY HINOJOSA is a 89y (02-Dec-1933) woman with a PMHx significant for afib (on Xarelto), who is consulted w/ CC of R weakness. LKW 9:25pm (per bedside RN). Last dose Xarelto given 5pm. RRT called at 11:30pm for new onset R sided weakness. NIHSS 2 upon stroke team arrival (RUE drift, R facial droop). Stat CBC, CMP, coag, trop ordered per stroke protocol. Taken CT for CTH/A/P. Patient stating she has never had symptoms like this before. She is calm and in no acute distress.      Review of Systems:  INCOMPLETE   CONSTITUTIONAL: No fevers or chills  EYES AND ENT: No visual changes or no throat pain   NECK: No pain or stiffness  RESPIRATORY: No hemoptysis or shortness of breath  CARDIOVASCULAR: No chest pain or palpitations  GASTROINTESTINAL: No melena or hematochezia  GENITOURINARY: No dysuria or hematuria  NEUROLOGICAL: +As stated in HPI above  SKIN: No itching, burning, rashes, or lesions   All other review of systems is negative unless indicated above.    Allergies:      PMHx/PSHx/Family Hx: As above, otherwise see below   HTN (hypertension)    Hyperlipidemia    CAD (coronary artery disease)    MVP (mitral valve prolapse)    HTN (hypertension)    Chronic atrial fibrillation        Social Hx:  No current use of tobacco, alcohol, or illicit drugs  Lives with ***    Medications:  MEDICATIONS  (STANDING):  acetaminophen     Tablet .. 975 milliGRAM(s) Oral every 6 hours  atorvastatin 80 milliGRAM(s) Oral at bedtime  rivaroxaban 15 milliGRAM(s) Oral with dinner    MEDICATIONS  (PRN):  morphine  - Injectable 1 milliGRAM(s) IV Push every 6 hours PRN Severe Pain (7 - 10)      Vitals:  T(C): 36.5 (02-24-23 @ 21:25), Max: 36.8 (02-24-23 @ 17:51)  HR: 90 (02-24-23 @ 21:25) (71 - 90)  BP: 119/64 (02-24-23 @ 21:25) (106/82 - 189/89)  RR: 16 (02-24-23 @ 21:25) (16 - 20)  SpO2: 97% (02-24-23 @ 21:25) (96% - 100%)    Physical Examination: INCOMPLETE  General - NAD  Cardiovascular - Peripheral pulses palpable, no edema  Eyes - Fundoscopy with flat, sharp optic discs and no hemorrhage or exudates; Fundoscopy not well visualized; Fundoscopy not performed due to safety precautions in the setting of the COVID-19 pandemic    Neurologic Exam:  Mental status - Awake, Alert, Oriented to person, place, and time. Speech fluent, repetition and naming intact. Follows simple and complex commands. Attention/concentration, recent and remote memory (including registration and recall), and fund of knowledge intact    Cranial nerves - PERRLA, VFF, EOMI, face sensation (V1-V3) intact b/l, facial strength intact without asymmetry b/l, hearing intact b/l, palate with symmetric elevation, trapezius OR sternocleidomastiod 5/5 strength b/l, tongue midline on protrusion with full lateral movement    Motor - Normal bulk and tone throughout. No pronator drift.  Strength testing            Deltoid      Biceps      Triceps     Wrist Extension    Wrist Flexion     Interossei         R            5                 5               5                     5                              5                        5                 5  L             5                 5               5                     5                              5                        5                 5              Hip Flexion    Hip Extension    Knee Flexion    Knee Extension    Dorsiflexion    Plantar Flexion  R              5                           5                       5                           5                            5                          5  L              5                           5                        5                           5                            5                          5    Sensation - Light touch/temperature OR pain/vibration intact throughout    DTR's -             Biceps      Triceps     Brachioradialis      Patellar    Ankle    Toes/plantar response  R             2+             2+                  2+                       2+            2+                 Down  L              2+             2+                 2+                        2+           2+                 Down    Coordination - Finger to Nose intact b/l. No tremors appreciated    Gait and station - Normal casual gait. Romberg (-)    Labs:                        13.6   15.56 )-----------( 158      ( 24 Feb 2023 10:57 )             40.8     02-24    140  |  101  |  22  ----------------------------<  96  4.1   |  24  |  0.94    Ca    10.0      24 Feb 2023 10:57    TPro  7.7  /  Alb  4.4  /  TBili  0.5  /  DBili  x   /  AST  38  /  ALT  34  /  AlkPhos  195<H>  02-24    CAPILLARY BLOOD GLUCOSE  99 (24 Feb 2023 15:23)      POCT Blood Glucose.: 221 mg/dL (24 Feb 2023 23:47)    LIVER FUNCTIONS - ( 24 Feb 2023 10:57 )  Alb: 4.4 g/dL / Pro: 7.7 g/dL / ALK PHOS: 195 U/L / ALT: 34 U/L / AST: 38 U/L / GGT: x             PT/INR - ( 24 Feb 2023 10:57 )   PT: 16.2 sec;   INR: 1.40 ratio         PTT - ( 24 Feb 2023 10:57 )  PTT:35.2 sec                  Radiology:  repeat CTH neg   Neurology - Consult Note    -  Spectra: [phone] ([facility]), [phone] ([facility])  -    HPI: Patient SHEY HINOJOSA is a 89y (02-Dec-1933) woman with a PMHx significant for afib (on Xarelto), who is consulted w/ CC of R weakness. LKW 9:25pm (per bedside RN). Last dose Xarelto given 5pm. RRT called at 11:30pm for new onset R sided weakness. NIHSS 2 upon stroke team arrival (RUE drift, R facial droop). Stat CBC, CMP, coag, trop ordered per stroke protocol. Taken CT for CTH/A/P. Patient stating she has never had symptoms like this before. She is calm and in no acute distress.      Review of Systems:  CONSTITUTIONAL: No fevers or chills  EYES AND ENT: No visual changes or no throat pain   NECK: No pain or stiffness  RESPIRATORY: No hemoptysis or shortness of breath  CARDIOVASCULAR: No chest pain or palpitations  GASTROINTESTINAL: No melena or hematochezia  GENITOURINARY: No dysuria or hematuria  NEUROLOGICAL: +As stated in HPI above  SKIN: No itching, burning, rashes, or lesions   All other review of systems is negative unless indicated above.    Allergies:      PMHx/PSHx/Family Hx: As above, otherwise see below   HTN (hypertension)    Hyperlipidemia    CAD (coronary artery disease)    MVP (mitral valve prolapse)    HTN (hypertension)    Chronic atrial fibrillation        Social Hx:  unknown    Medications:  MEDICATIONS  (STANDING):  acetaminophen     Tablet .. 975 milliGRAM(s) Oral every 6 hours  atorvastatin 80 milliGRAM(s) Oral at bedtime  rivaroxaban 15 milliGRAM(s) Oral with dinner    MEDICATIONS  (PRN):  morphine  - Injectable 1 milliGRAM(s) IV Push every 6 hours PRN Severe Pain (7 - 10)      Vitals:  T(C): 36.5 (02-24-23 @ 21:25), Max: 36.8 (02-24-23 @ 17:51)  HR: 90 (02-24-23 @ 21:25) (71 - 90)  BP: 119/64 (02-24-23 @ 21:25) (106/82 - 189/89)  RR: 16 (02-24-23 @ 21:25) (16 - 20)  SpO2: 97% (02-24-23 @ 21:25) (96% - 100%)    Physical Examination:   General - NAD  Cardiovascular - Peripheral pulses palpable, no edema  Eyes - Fundoscopy not performed due to safety precautions in the setting of the COVID-19 pandemic    Neurologic Exam:  Mental status - Awake, Alert, Oriented to person, place, and time. Speech fluent, repetition and naming intact. Follows simple and complex commands. Attention/concentration, recent and remote memory (including registration and recall), and fund of knowledge intact    Cranial nerves - PERRLA, VFF, EOMI, face sensation (V1-V3) intact b/l, subtle R facial droop, hearing intact b/l, palate with symmetric elevation, trapezius OR sternocleidomastiod 5/5 strength b/l, tongue midline on protrusion with full lateral movement    Motor - Normal bulk and tone throughout. Mild pronator drift w/ RUE. Hand  5/5 b/l. Deltoids anti-gravity b/l. Able to give resistance b/l w/ hip flexors.     Sensation - Light touch/temperature throughout    DTR's - did not assess due to acute illness    Coordination - Finger to Nose intact b/l. No tremors appreciated    Gait and station - did not assess due to acute illness    Labs:                        13.6   15.56 )-----------( 158      ( 24 Feb 2023 10:57 )             40.8     02-24    140  |  101  |  22  ----------------------------<  96  4.1   |  24  |  0.94    Ca    10.0      24 Feb 2023 10:57    TPro  7.7  /  Alb  4.4  /  TBili  0.5  /  DBili  x   /  AST  38  /  ALT  34  /  AlkPhos  195<H>  02-24    CAPILLARY BLOOD GLUCOSE  99 (24 Feb 2023 15:23)      POCT Blood Glucose.: 221 mg/dL (24 Feb 2023 23:47)    LIVER FUNCTIONS - ( 24 Feb 2023 10:57 )  Alb: 4.4 g/dL / Pro: 7.7 g/dL / ALK PHOS: 195 U/L / ALT: 34 U/L / AST: 38 U/L / GGT: x             PT/INR - ( 24 Feb 2023 10:57 )   PT: 16.2 sec;   INR: 1.40 ratio         PTT - ( 24 Feb 2023 10:57 )  PTT:35.2 sec                  Radiology:  CT HEAD:  No large territory acute infarct, intracranial hemorrhage, or mass effect.    CTA HEAD/NECK:  No occlusion or flow-limiting stenosis.    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    Mild calcified plaque at the bilateral carotid bulbs, producing up to 40%   stenosis of the proximal left ICA by NASCET criteria. No hemodynamically   flow-limiting stenosis at the origin of the right ICA.    CT PERFUSION:  There is no convincing evidence of core infarct or ischemic penumbra.

## 2023-02-25 NOTE — CHART NOTE - NSCHARTNOTEFT_GEN_A_CORE
Documentation to reflect Stroke Service attention to case:      Patient interviewed and examined at the bedside on the morning of 2/25/2023. Please see attending attestation to Consult Note from 2/24/2023 for further plan of care.

## 2023-02-25 NOTE — STROKE CODE NOTE - NSSTROKETPAEXCLABS_THROMBIN2
--- Use of oral direct thrombin inhibitor (e.g. dabigatran) or oral direct factor Xa inhibitors (e.g. apixaban, rivaroxaban) within 48 hours who are less than 4.5 hours from last known well, and have any degree of abnormal coagulation studies (PT/INR, PTT [anti factor-Xa])
--- Use of oral direct thrombin inhibitor (e.g. dabigatran) or oral direct factor Xa inhibitors (e.g. apixaban, rivaroxaban) within 48 hours who are less than 4.5 hours from last known well, and have any degree of abnormal coagulation studies (PT/INR, PTT [anti factor-Xa])

## 2023-02-25 NOTE — PROGRESS NOTE ADULT - ASSESSMENT
Patient is an 89 year old female from home AAO x3, with PMH of Afib on xarelto, CAD, HTN, HLD and history of right ankle fracture s/p repair 9 months ago, who presented to the ED s/p fall at home.     WBC of 15K. Xray noted to show acute fractures of bilateral pubic rami. CT pelvis done showing acute fracture of superior and inferior pubic rami and right sacral ala. Hematoma in right lower pelvis. CXR reviewed; appears clear. Code stroke called in ED due to facial droop and right UE weakness. CT head, CTA brain and neck done with no acute abnormalities.       Acute CVA:    Neuro f/up noted  ECHO  Cardio eval

## 2023-02-25 NOTE — CONSULT NOTE ADULT - ASSESSMENT
89y F with Hx afib (on Xarelto), presenting w/ acute R arm weakness      LKW: 9:25pm 2/24  NIHSS:  2  Baseline MRS: 0  Not a Teneceteplase candidate due to abnormal coag  Not a thrombectomy candidate due to lack of LVO    CT head: neg  CTA/P: neg    Impression:  acute R arm drift and R facial droop. NIHSS 2. Repeat CTH/A/P unremarkable. DDx ischemic stroke, TIA, toxic/metabolic/infectious    Recommendations:  [] c/w Xarelto  [] Atorvastatin 40-80 mg daily titrated per LDL < 70  [] HgbA1C, fasting lipid panel, CBC, CMP, coag panel, troponin  [] MRI brain w/o con (if not contraindicated)  [] TTE w/ bubble study  [] telemetry to check for arrhythmia, EKG, will discuss loop recorder    - Tight glucose control (long-term goal HgbA1c < 6%)  - Stroke education and counseling  - Neuro-checks and VS q4h  - Permissive HTN up to 220/120 for 24-48h from symptom onset  - Dysphagia screen. If fails, speech/swallow eval  - aspiration, fall precautions  - STAT CT head non-contrast for change in neuro exam.   - PT/ OT / DVT ppx per primary team     Discussed with stroke fellow Dr. Betsey Serrano      and under supervision of attending Dr. Lakeshia Wilson      regarding decision against candidacy for Tenecteplase / thrombectomy. Will be formally staffed on morning rounds with attending. Recommendations will be complete once signed by attending.

## 2023-02-25 NOTE — PROGRESS NOTE ADULT - SUBJECTIVE AND OBJECTIVE BOX
Patient is a 89y old  Female who presents with a chief complaint of fall; pubic rami fracture (25 Feb 2023 04:47)      SUBJECTIVE / OVERNIGHT EVENTS:    Events noted. Slurred speech  CONSTITUTIONAL: No fever,  or fatigue  RESPIRATORY: No cough, wheezing,  No shortness of breath  CARDIOVASCULAR: No chest pain, palpitations, dizziness, or leg swelling  GASTROINTESTINAL: No abdominal or epigastric pain.       MEDICATIONS  (STANDING):  acetaminophen     Tablet .. 975 milliGRAM(s) Oral every 6 hours  atorvastatin 80 milliGRAM(s) Oral at bedtime  chlorhexidine 2% Cloths 1 Application(s) Topical daily  rivaroxaban 15 milliGRAM(s) Oral with dinner    MEDICATIONS  (PRN):  morphine  - Injectable 1 milliGRAM(s) IV Push every 6 hours PRN Severe Pain (7 - 10)        CAPILLARY BLOOD GLUCOSE      POCT Blood Glucose.: 221 mg/dL (24 Feb 2023 23:47)    I&O's Summary      T(C): 36.9 (02-25-23 @ 21:00), Max: 36.9 (02-25-23 @ 11:01)  HR: 65 (02-25-23 @ 21:00) (65 - 79)  BP: 134/69 (02-25-23 @ 21:00) (114/67 - 137/64)  RR: 18 (02-25-23 @ 21:00) (18 - 18)  SpO2: 95% (02-25-23 @ 21:00) (95% - 97%)    PHYSICAL EXAM:  GENERAL: NAD  NECK: Supple, No JVD  CHEST/LUNG: Clear to auscultation bilaterally; No wheezing.  HEART: Regular rate and rhythm; No murmurs, rubs, or gallops  ABDOMEN: Soft, Nontender, Nondistended; Bowel sounds present  EXTREMITIES:   No edema  NEUROLOGY: AAO       LABS:                        10.8   12.54 )-----------( 142      ( 25 Feb 2023 07:08 )             32.6     02-25    138  |  103  |  24<H>  ----------------------------<  112<H>  4.0   |  22  |  0.87    Ca    9.3      25 Feb 2023 07:29  Phos  3.2     02-25  Mg     1.8     02-25    TPro  6.3  /  Alb  3.7  /  TBili  0.6  /  DBili  x   /  AST  61<H>  /  ALT  33  /  AlkPhos  149<H>  02-25    PT/INR - ( 25 Feb 2023 07:09 )   PT: 21.9 sec;   INR: 1.88 ratio         PTT - ( 25 Feb 2023 07:09 )  PTT:32.1 sec        CAPILLARY BLOOD GLUCOSE      POCT Blood Glucose.: 221 mg/dL (24 Feb 2023 23:47)        RADIOLOGY & ADDITIONAL TESTS:    Imaging Personally Reviewed:    Consultant(s) Notes Reviewed:      Care Discussed with Consultants/Other Providers:    Francisco Cheng MD, CMD, FACP    [address] [place] [address]  Office Tel: [phone]  Cell: [phone]

## 2023-02-25 NOTE — PROGRESS NOTE ADULT - SUBJECTIVE AND OBJECTIVE BOX
Neurology Progress Note    SUBJECTIVE/OBJECTIVE/INTERVAL EVENTS: Patient SHEY HINOJOSA is a 89y (02-Dec-1933) woman with a PMHx significant for afib (on Xarelto), who is consulted w/ CC of R weakness. LKW 9:25pm (per bedside RN). Last dose Xarelto given 5pm. RRT called at 11:30pm for new onset R sided weakness. Code stroke called at 11:52pm. NIHSS 2 upon stroke team arrival (RUE drift, R facial droop). Stat CBC, CMP, coag, trop ordered per stroke protocol. Taken CT for CTH/A/P. Patient stating she has never had symptoms like this before. She is calm and in no acute distress.    REVIEW OF SYSTEMS: Otherwise denies fever, chills, headaches, vision changes, blurry vision, double vision, nausea, vomiting, hearing change, focal weakness, focal numbness, parasthesias, bowel/ bladder incontinence.  Few questions of a 10-system ROS was performed and is negative except for those items noted above and/or in the HPI.    VITALS & EXAMINATION:  Vital Signs Last 24 Hrs  T(C): 36.5 (24 Feb 2023 21:25), Max: 36.8 (24 Feb 2023 17:51)  T(F): 97.7 (24 Feb 2023 21:25), Max: 98.2 (24 Feb 2023 17:51)  HR: 90 (24 Feb 2023 21:25) (71 - 90)  BP: 119/64 (24 Feb 2023 21:25) (106/82 - 189/89)  BP(mean): --  RR: 16 (24 Feb 2023 21:25) (16 - 20)  SpO2: 97% (24 Feb 2023 21:25) (96% - 100%)    Parameters below as of 24 Feb 2023 21:25  Patient On (Oxygen Delivery Method): room air        General:  Constitutional: Female, appears stated age   Head: Normocephalic; Eyes: clear sclera;   Extremities: No cyanosis; Skin: No mao edema of LE  Resp: breathing comfortably     Neurological (>12):  Mental status - Awake, Alert, Oriented to person, place, and time. Speech fluent, repetition and naming intact. Follows simple and complex commands. Attention/concentration, recent and remote memory (including registration and recall), and fund of knowledge intact    Cranial nerves - PERRLA, VFF, EOMI, face sensation (V1-V3) intact b/l, subtle R facial droop, hearing intact b/l, palate with symmetric elevation, trapezius OR sternocleidomastiod 5/5 strength b/l, tongue midline on protrusion with full lateral movement    Motor - Normal bulk and tone throughout. Mild pronator drift w/ RUE. Hand  5/5 b/l. Deltoids anti-gravity b/l. Able to give resistance b/l w/ hip flexors.     Sensation - Light touch/temperature throughout    DTR's - did not assess due to acute illness    Coordination - Finger to Nose intact b/l. No tremors appreciated    Gait and station - did not assess due to acute illness    LABORATORY:  CBC                       11.9   13.75 )-----------( 158      ( 25 Feb 2023 00:08 )             36.6     Chem 02-25    139  |  102  |  25<H>  ----------------------------<  214<H>  3.7   |  24  |  0.93    Ca    9.6      25 Feb 2023 00:08  Phos  3.4     02-25  Mg     1.8     02-25    TPro  6.8  /  Alb  3.9  /  TBili  0.7  /  DBili  x   /  AST  50<H>  /  ALT  29  /  AlkPhos  162<H>  02-25    LFTs LIVER FUNCTIONS - ( 25 Feb 2023 00:08 )  Alb: 3.9 g/dL / Pro: 6.8 g/dL / ALK PHOS: 162 U/L / ALT: 29 U/L / AST: 50 U/L / GGT: x           Coagulopathy PT/INR - ( 25 Feb 2023 00:08 )   PT: 23.8 sec;   INR: 2.05 ratio         PTT - ( 25 Feb 2023 00:08 )  PTT:37.4 sec      STUDIES & IMAGING: (EEG, CT, MR, U/S, TTE/DYLON):  CT HEAD:  No large territory acute infarct, intracranial hemorrhage, or mass effect.    CTA HEAD/NECK:  No occlusion or flow-limiting stenosis.    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    Mild calcified plaque at the bilateral carotid bulbs, producing up to 40%   stenosis of the proximal left ICA by NASCET criteria. No hemodynamically   flow-limiting stenosis at the origin of the right ICA.    CT PERFUSION:  There is no convincing evidence of core infarct or ischemic penumbra.

## 2023-02-25 NOTE — PHYSICAL THERAPY INITIAL EVALUATION ADULT - PERTINENT HX OF CURRENT PROBLEM, REHAB EVAL
88y/o F from home AAO x3, with PMH of Afib on xarelto, CAD, HTN, HLD and history of R ankle fracture s/p repair 9 months ago, who presented to the ED s/p fall at home. Pt states that earlier this morning, she was crouching down to  something and her right leg gave out and patient had fallen. Pt unable to get up and was afraid she would fall again if she got up. States she had fallen mainly on her right side. States that she has been having problems with her leg ever since she had her ankle surgery about 9 months ago. Denies any pain in LLE. Of note, pt was noted to have RUE weakness and facial droop. Pt was unaware of facial droop but daughter at bedside confirmed noticing right facial droop with some slurred speech. Symptoms all currently resolved. 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 No hemodynamically flow-limiting stenosis at the origin of the right ICA.CT PERFUSION:There is no convincing evidence of core infarct or ischemic penumbra. CTPelvis: Acute fractures of the superior and inferior pubic rami bilaterally and of the right sacral ala. Hematoma in the right lower pelvis with slight mass effect upon the right lateral wall of the bladder. MRIHead; Acute infarct involving the left posterior putamen and frontal corona radiata. No acute intracranial hemorrhage. CXR: (-)

## 2023-02-25 NOTE — PHYSICAL THERAPY INITIAL EVALUATION ADULT - MANUAL MUSCLE TESTING RESULTS, REHAB EVAL
What Type Of Note Output Would You Prefer (Optional)?: Bullet Format What Is The Reason For Today's Visit?: Full Body Skin Examination What Is The Reason For Today's Visit? (Being Monitored For X): concerning skin lesions on an annual basis LUE/LLE at least 3/5 throughout; RLE at least 2/5 throughout; R shoulder 1/5 R elbow 2/5 R wrist 2/5 R hand  2/5

## 2023-02-25 NOTE — CHART NOTE - NSCHARTNOTEFT_GEN_A_CORE
Patient is s/p RRT/code stroke for new onset right sided weakness. See RRT note for full detail.  Post RRT, patient remains on the unit.  F/u with CBC, CMP, mag, phos, PT/PTT/INR, cardiac enzymes, VBG with lytes and lactate, and BCx x 2, CTH/CTA.  Continue to monitor.  Patient's daughter, Aida Longo ([phone]), has been updated regarding the above events.  Will endorse to primary team in AM.      Opal Faustin PA-C  Department of Medicine  [facility] [phone]

## 2023-02-26 NOTE — PROGRESS NOTE ADULT - SUBJECTIVE AND OBJECTIVE BOX
Patient is a 89y old  Female who presents with a chief complaint of fall; pubic rami fracture (25 Feb 2023 15:32)      SUBJECTIVE / OVERNIGHT EVENTS:    Events noted. Slurred speech  CONSTITUTIONAL: No fever,  or fatigue  RESPIRATORY: No cough, wheezing,  No shortness of breath  CARDIOVASCULAR: No chest pain, palpitations, dizziness, or leg swelling  GASTROINTESTINAL: No abdominal or epigastric pain. No nausea, vomiting.      MEDICATIONS  (STANDING):  acetaminophen     Tablet .. 975 milliGRAM(s) Oral every 6 hours  atorvastatin 80 milliGRAM(s) Oral at bedtime  chlorhexidine 2% Cloths 1 Application(s) Topical daily  rivaroxaban 15 milliGRAM(s) Oral with dinner    MEDICATIONS  (PRN):  morphine  - Injectable 1 milliGRAM(s) IV Push every 6 hours PRN Severe Pain (7 - 10)        CAPILLARY BLOOD GLUCOSE        I&O's Summary    25 Feb 2023 07:01  -  26 Feb 2023 07:00  --------------------------------------------------------  IN: 0 mL / OUT: 550 mL / NET: -550 mL        T(C): 36.6 (02-26-23 @ 14:38), Max: 36.9 (02-25-23 @ 21:00)  HR: 71 (02-26-23 @ 14:38) (65 - 88)  BP: 105/72 (02-26-23 @ 14:38) (105/72 - 144/75)  RR: 18 (02-26-23 @ 14:38) (18 - 18)  SpO2: 96% (02-26-23 @ 14:38) (95% - 96%)    PHYSICAL EXAM:    NECK: Supple, No JVD  CHEST/LUNG: Clear to auscultation bilaterally; No wheezing.  HEART: Regular rate and rhythm; No murmurs, rubs, or gallops  ABDOMEN: Soft, Nontender, Nondistended; Bowel sounds present  EXTREMITIES:   No edema  NEUROLOGY: AAO       LABS:                        11.4   11.04 )-----------( 129      ( 26 Feb 2023 06:30 )             34.5     02-26    140  |  104  |  22  ----------------------------<  110<H>  3.9   |  26  |  0.81    Ca    9.1      26 Feb 2023 06:00  Phos  3.2     02-25  Mg     1.8     02-25    TPro  6.3  /  Alb  3.7  /  TBili  0.6  /  DBili  x   /  AST  61<H>  /  ALT  33  /  AlkPhos  149<H>  02-25    PT/INR - ( 25 Feb 2023 07:09 )   PT: 21.9 sec;   INR: 1.88 ratio         PTT - ( 25 Feb 2023 07:09 )  PTT:32.1 sec        CAPILLARY BLOOD GLUCOSE            RADIOLOGY & ADDITIONAL TESTS:    Imaging Personally Reviewed:    Consultant(s) Notes Reviewed:      Care Discussed with Consultants/Other Providers:    Francisco Cheng MD, CMD, FACP    [address] [address]  Office Tel: [phone]  Cell: [phone]

## 2023-02-26 NOTE — OCCUPATIONAL THERAPY INITIAL EVALUATION ADULT - ADL RETRAINING, OT EVAL
GOAL: Patient will require MIN A with UB dressing within 4 weeks GOAL: Patient will perform grooming while seated with set-up assist in 4 weeks

## 2023-02-26 NOTE — OCCUPATIONAL THERAPY INITIAL EVALUATION ADULT - SHORT TERM MEMORY, REHAB EVAL
Scored 2 error points out of 28 on Short Blessed Test (cog screen) indicating normal cognition./intact

## 2023-02-26 NOTE — PROGRESS NOTE ADULT - ASSESSMENT
Patient is an 89 year old female from home AAO x3, with PMH of Afib on xarelto, CAD, HTN, HLD and history of right ankle fracture s/p repair 9 months ago, who presented to the ED s/p fall at home.     WBC of 15K. Xray noted to show acute fractures of bilateral pubic rami. CT pelvis done showing acute fracture of superior and inferior pubic rami and right sacral ala. Hematoma in right lower pelvis. CXR reviewed; appears clear. Code stroke called in ED due to facial droop and right UE weakness. CT head, CTA brain and neck done with no acute abnormalities.       Acute CVA:    Neuro f/up noted  ECHO  Cardio eval called    Dw family

## 2023-02-26 NOTE — OCCUPATIONAL THERAPY INITIAL EVALUATION ADULT - LIVES WITH, PROFILE
Pt lives alone in an apartment, elevator to enter, IND PTA without AD +.  Pt owns RW, w/c, and cane from previous ankle fx.

## 2023-02-26 NOTE — OCCUPATIONAL THERAPY INITIAL EVALUATION ADULT - RUE MMT, REHAB EVAL
shoulder=0/5, elbow flexion=2/5, elbow extension=1/5, wrist/digits=1/5 (will hold on splints at this time 2/2 movement observed)

## 2023-02-26 NOTE — OCCUPATIONAL THERAPY INITIAL EVALUATION ADULT - PERTINENT HX OF CURRENT PROBLEM, REHAB EVAL
89 year old female from home AAO x3, with PMH of Afib on xarelto, CAD, HTN, HLD and history of right ankle fracture s/p repair 9 months ago, who presented to the ED s/p fall at home. Patient states that earlier this morning, she was crouching down to  something and her right leg gave out and patient had fallen.  Xray noted to show acute fractures of bilateral pubic rami. CT pelvis done showing acute fracture of superior and inferior pubic rami and right sacral ala. Hematoma in right lower pelvis. CXR reviewed; appears clear. No intervention per ortho, WBAT.    S/p code stroke 2/25 11:52pm. NIHSS 2 upon stroke team arrival (RUE drift, R facial droop). Not a Teneceteplase candidate due to abnormal coag. Not a thrombectomy candidate due to lack of LVO.     MRI HEAD: Acute infarct involving the left posterior putamen and frontal corona radiata. No acute intracranial hemorrhage. CTA HEAD/NECK:No occlusion or flow-limiting stenosis. 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 Mild calcified plaque at the bilateral carotid bulbs, producing up to 40% stenosis of the proximal left ICA by NASCET criteria. No hemodynamically flow-limiting stenosis at the origin of the right ICA. CT PERFUSION: There is no convincing evidence of core infarct or ischemic penumbra.

## 2023-02-27 NOTE — CONSULT NOTE ADULT - ASSESSMENT
Echo 4/6/21: Nml LV fxn EF 61%    A/P  89 year old female from home AAO x3, with PMH of Afib on xarelto, CAD, HTN, HLD and history of right ankle fracture s/p repair 9 months ago, who presented to the ED s/p fall at home, found to have b/l pubic rami fx with hematoma and acute infarct on MRI.    #Acute CVA  -Likely cause of fall given reported hx and no cardiac prodrome sx  -Neuro following  -Continue xarelto  -Continue atorvastatin  -Prior echo with nml lv fxn  -Get echo   -Pending CT CAP to r/o malignancy    #Afib  -Presently in NSR on tele, but intermittently converts to afib with controlled rates  -Resume metoprolol 25mg qd  -Continue xarelto  -Recommend EP consult to eval need for ILR  -Get echo    #CAD  -Continue atorvastatin    #HTN  -Resume metoprolol as above  -Hold losartan and lisinopril for now    #Pubic rami fx w/ hematoma  -Per ortho no surgical intervention  -Monitor h/h while on xarelto      D/w acp  Please call/text me with any questions/concerns between 8am-4pm  [phone]   Echo 4/6/21: Nml LV fxn EF 61%    A/P  89 year old female from home AAO x3, with PMH of Afib on xarelto, CAD, HTN, HLD and history of right ankle fracture s/p repair 9 months ago, who presented to the ED s/p fall at home, found to have b/l pubic rami fx with hematoma and acute infarct on MRI.    #Acute CVA  -Likely cause of fall given reported hx and no cardiac prodrome sx  -Neuro following  -Continue xarelto  -Continue atorvastatin  -Prior echo with nml lv fxn  -Get echo   -Pending CT CAP to r/o malignancy    #Afib  -Presently in NSR on tele, but intermittently converts to afib with controlled rates  -Resume metoprolol 25mg qd  -Continue xarelto  -Get echo    #CAD  -Continue atorvastatin    #HTN  -Resume metoprolol as above  -Hold losartan and lisinopril for now    #Pubic rami fx w/ hematoma  -Per ortho no surgical intervention  -Monitor h/h while on xarelto      D/w acp  Please call/text me with any questions/concerns between 8am-4pm  [phone]

## 2023-02-27 NOTE — CONSULT NOTE ADULT - SUBJECTIVE AND OBJECTIVE BOX
CARDIOLOGY CONSULT - Dr. Booker         HPI:  Patient is an 89 year old female from home AAO x3, with PMH of Afib on xarelto, CAD, HTN, HLD and history of right ankle fracture s/p repair 9 months ago, who presented to the ED s/p fall at home. Patient states that earlier this morning, she was crouching down to  something and her right leg gave out and patient had fallen. Patient unable to get up and was afraid she would fall again if she got up. States she had fallen mainly on her right side. Denies any LOC or hitting head. States that she has been having problems with her leg ever since she had her ankle surgery about 9 months ago. Denies any pain in LLE.     Of note, patient was noted to have right upper extremity weakness and facial droop. Patient was unaware of facial droop but daughter at bedside confirmed noticing right facial droop with some slurred speech. Denies any confusion or LOC at the time. Symptoms all currently resolved. Denies any history of symptoms similar in the past. Denies any history of CVA. History of CVA in her mother.    Pt examined at bedside. She remembers her fall and states that she felt her right lower leg become weak and give out prior to her fall. She denies CP, palpitations, SOB and dizziness prior to her fall. Presently denying CV complaints. She follows w/ Dr. Govea.      PAST MEDICAL & SURGICAL HISTORY:  HTN (hypertension)      Hyperlipidemia      CAD (coronary artery disease)  Non-obstructive- Had an angiogram years ago      MVP (mitral valve prolapse)      HTN (hypertension)      Chronic atrial fibrillation      History of ankle surgery              PREVIOUS DIAGNOSTIC TESTING:    [x] Echocardiogram:  < from: Transthoracic Echocardiogram (04.06.21 @ 11:58) >  CONCLUSIONS:  1. Mitral annular calcification, otherwise normal mitral  valve. Mild mitral regurgitation.  2. Calcified trileaflet aortic valve with normal opening.  3. Normal left ventricular internal dimensions and wall  thicknesses.  4. Normal left ventricular systolic function. No segmental  wall motion abnormalities.  5. Normal right ventricular size and function.    < end of copied text >    [ ]  Catheterization:  [ ] Stress Test:  	    MEDICATIONS:  Home Medications:  losartan 25 mg oral tablet: 1 tab(s) orally once a day (24 Feb 2023 17:17)  metoprolol succinate 25 mg oral tablet, extended release: 1 tab(s) orally once a day (at bedtime) (24 Feb 2023 17:17)  Xarelto 20 mg oral tablet: 1 tab(s) orally once a day (in the evening) (24 Feb 2023 17:17)      MEDICATIONS  (STANDING):  acetaminophen     Tablet .. 975 milliGRAM(s) Oral every 6 hours  atorvastatin 80 milliGRAM(s) Oral at bedtime  chlorhexidine 2% Cloths 1 Application(s) Topical daily  rivaroxaban 15 milliGRAM(s) Oral with dinner      FAMILY HISTORY:  Family history of CVA (Mother)        SOCIAL HISTORY:    [x] Non-smoker  [ ] Smoker  [ ] Alcohol    Allergies    No Known Allergies    Intolerances    	    REVIEW OF SYSTEMS:  CONSTITUTIONAL: No fever, weight loss, or fatigue  EYES: No eye pain, visual disturbances, or discharge  ENMT:  No difficulty hearing, tinnitus, vertigo; No sinus or throat pain  NECK: No pain or stiffness  RESPIRATORY: No cough, wheezing, chills or hemoptysis; No Shortness of Breath  CARDIOVASCULAR: No chest pain, palpitations, passing out, dizziness, or leg swelling  GASTROINTESTINAL: No abdominal or epigastric pain. No nausea, vomiting, or hematemesis; No diarrhea or constipation. No melena or hematochezia.  GENITOURINARY: No dysuria, frequency, hematuria, or incontinence  NEUROLOGICAL: No headaches, memory loss, numbness, or tremors. +R sided weakness  SKIN: No itching, burning, rashes, or lesions   	    [x] All others negative	  [ ] Unable to obtain    PHYSICAL EXAM:  T(C): 36.7 (02-27-23 @ 11:47), Max: 36.9 (02-27-23 @ 04:31)  HR: 75 (02-27-23 @ 11:47) (71 - 90)  BP: 129/77 (02-27-23 @ 11:47) (105/66 - 129/77)  RR: 18 (02-27-23 @ 11:47) (18 - 18)  SpO2: 97% (02-27-23 @ 11:47) (96% - 98%)  Wt(kg): --  I&O's Summary    27 Feb 2023 07:01  -  27 Feb 2023 12:53  --------------------------------------------------------  IN: 300 mL / OUT: 0 mL / NET: 300 mL        Appearance: Normal	  Psychiatry: A & O x 3, Mood & affect appropriate  HEENT:   Dysarthria  Lymphatic: No lymphadenopathy  Cardiovascular: Normal S1 S2,RRR, No JVD, No murmurs  Respiratory: Lungs clear to auscultation b/l	  Gastrointestinal:  Soft, Non-tender, + BS	  Skin: No rashes, No ecchymoses, No cyanosis	  Neurologic: Non-focal  Extremities: +RUE and RLE weakness  Vascular: Peripheral pulses palpable 2+ bilaterally    TELEMETRY: SR with intermittenet afibb 70-100s	    ECG:  	  RADIOLOGY:  < from: MR Head No Cont (02.25.23 @ 09:53) >  IMPRESSION:  Acute infarct involving the left posterior putamen and frontal corona   radiata. No acute intracranial hemorrhage.    Report also sent to the referring providers via [employer] teams message.    < end of copied text >    < from: CT Angio Brain Stroke Protocol  w/ IV Cont (02.25.23 @ 00:43) >  IMPRESSION:    CTA HEAD/NECK:  No occlusion or flow-limiting stenosis.    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    Mild calcified plaque at the bilateral carotid bulbs, producing up to 40%   stenosis of the proximal left ICA by NASCET criteria. No hemodynamically   flow-limiting stenosis at the origin of the right ICA.    CT PERFUSION:  There is no convincing evidence of core infarct or ischemic penumbra.    < end of copied text >      OTHER: 	  	  LABS:	 	    CARDIAC MARKERS:  Troponin T, High Sensitivity Result: 355 ng/L (02-25 @ 07:29)  Troponin T, High Sensitivity Result: 308 ng/L (02-25 @ 00:08)                                  11.4   13.74 )-----------( 146      ( 27 Feb 2023 06:42 )             35.3     02-27    141  |  103  |  30<H>  ----------------------------<  115<H>  4.1   |  28  |  1.05    Ca    9.5      27 Feb 2023 06:41        proBNP:   Lipid Profile:   HgA1c:   TSH:

## 2023-02-27 NOTE — CONSULT NOTE ADULT - TIME BILLING
Agree with above PA note.  A/P  89 year old female from home AAO x3, with PMH of Afib on xarelto, CAD, HTN, HLD and history of right ankle fracture s/p repair 9 months ago, who presented to the ED s/p fall at home, found to have b/l pubic rami fx with hematoma and acute infarct on MRI.    #Acute CVA  -Neuro following  -Continue xarelto per neuro   -Continue atorvastatin  -f/u echo   -CT CAP to r/o malignancy    #Afib  -PAF, metoprolol 25mg qd  -Continue xarelto  -f/u echo    #CAD  -Continue atorvastatin    #HTN  -metoprolol   as above  -Hold losartan and lisinopril for now    #Pubic rami fx w/ hematoma  -Per ortho no surgical intervention  -Monitor h/h while on xarelto

## 2023-02-27 NOTE — PROGRESS NOTE ADULT - SUBJECTIVE AND OBJECTIVE BOX
Patient is a 89y old  Female who presents with a chief complaint of fall; pubic rami fracture (25 Feb 2023 15:32)      SUBJECTIVE / OVERNIGHT EVENTS:    Events noted. Slurred speech  CONSTITUTIONAL: No fever,  or fatigue  RESPIRATORY: No cough, wheezing,  No shortness of breath  CARDIOVASCULAR: No chest pain, palpitations, dizziness, or leg swelling  GASTROINTESTINAL: No abdominal or epigastric pain. No nausea, vomiting.      T(C): 36.7 (02-27-23 @ 20:57), Max: 36.7 (02-27-23 @ 11:47)  HR: 79 (02-27-23 @ 20:57) (75 - 79)  BP: 115/76 (02-27-23 @ 20:57) (115/76 - 129/77)  RR: 18 (02-27-23 @ 20:57) (18 - 18)  SpO2: 93% (02-27-23 @ 20:57) (93% - 97%)      MEDICATIONS  (STANDING):  acetaminophen     Tablet .. 975 milliGRAM(s) Oral every 6 hours  atorvastatin 80 milliGRAM(s) Oral at bedtime  chlorhexidine 2% Cloths 1 Application(s) Topical daily  metoprolol succinate ER 25 milliGRAM(s) Oral daily  rivaroxaban 15 milliGRAM(s) Oral with dinner    MEDICATIONS  (PRN):  morphine  - Injectable 1 milliGRAM(s) IV Push every 6 hours PRN Severe Pain (7 - 10)      I&O's Summary    25 Feb 2023 07:01  -  26 Feb 2023 07:00  --------------------------------------------------------  IN: 0 mL / OUT: 550 mL / NET: -550 mL        T(C): 36.6 (02-26-23 @ 14:38), Max: 36.9 (02-25-23 @ 21:00)  HR: 71 (02-26-23 @ 14:38) (65 - 88)  BP: 105/72 (02-26-23 @ 14:38) (105/72 - 144/75)  RR: 18 (02-26-23 @ 14:38) (18 - 18)  SpO2: 96% (02-26-23 @ 14:38) (95% - 96%)    PHYSICAL EXAM:    NECK: Supple, No JVD  CHEST/LUNG: Clear to auscultation bilaterally; No wheezing.  HEART: Regular rate and rhythm; No murmurs, rubs, or gallops  ABDOMEN: Soft, Nontender, Nondistended; Bowel sounds present  EXTREMITIES:   No edema  NEUROLOGY: AAO                           11.4   13.74 )-----------( 146      ( 27 Feb 2023 06:42 )             35.3               141|103|30<115  4.1|28|1.05  9.5,--,--  02-27 @ 06:41        RADIOLOGY & ADDITIONAL TESTS:    Imaging Personally Reviewed:    Consultant(s) Notes Reviewed:      Care Discussed with Consultants/Other Providers:

## 2023-02-27 NOTE — CONSULT NOTE ADULT - SUBJECTIVE AND OBJECTIVE BOX
Patient is a 89y old  Female who presents with a chief complaint of fall; pubic rami fracture (27 Feb 2023 12:51)    Admission HPI:  Patient is an 89 year old female from home AAO x3, with PMH of Afib on xarelto, CAD, HTN, HLD and history of right ankle fracture s/p repair 9 months ago, who presented to the ED s/p fall at home. Patient states that earlier this morning, she was crouching down to  something and her right leg gave out and patient had fallen. Patient unable to get up and was afraid she would fall again if she got up. States she had fallen mainly on her right side. Denies any LOC or hitting head. States that she has been having problems with her leg ever since she had her ankle surgery about 9 months ago. Denies any pain in LLE.     Of note, patient was noted to have right upper extremity weakness and facial droop. Patient was unaware of facial droop but daughter at bedside confirmed noticing right facial droop with some slurred speech. Denies any confusion or LOC at the time. Symptoms all currently resolved. Denies any history of symptoms similar in the past. Denies any history of CVA. History of CVA in her mother.  (24 Feb 2023 17:14)    Interval History:  Patient diagnosed w bl pubic rami fx- evaluated by ortho- no surgical intervention.  Course complicated by R sided weakness.  RRT called.  Had imaging  CT Brain Stroke Protocol (02.25.23 @ 00:36) >  No large territory acute infarct, intracranial hemorrhage, or mass effect.    MR Head No Cont (02.25.23 @ 09:53) >  Acute infarct involving the left posterior putamen and frontal corona   radiata. No acute intracranial hemorrhage.    REVIEW OF SYSTEMS: No chest pain, shortness of breath, nausea, vomiting or diarhea; other ROS neg     PAST MEDICAL & SURGICAL HISTORY  HTN (hypertension)  Hyperlipidemia  CAD (coronary artery disease)  MVP (mitral valve prolapse)  HTN (hypertension)  Chronic atrial fibrillation  History of ankle surgery    FUNCTIONAL HISTORY:   Lives alone, PTA Independent    CURRENT FUNCTIONAL STATUS:  Mod A transfers and gait    FAMILY HISTORY   Family history of CVA (Mother)    MEDICATIONS   acetaminophen     Tablet .. 975 milliGRAM(s) Oral every 6 hours  atorvastatin 80 milliGRAM(s) Oral at bedtime  chlorhexidine 2% Cloths 1 Application(s) Topical daily  morphine  - Injectable 1 milliGRAM(s) IV Push every 6 hours PRN  rivaroxaban 15 milliGRAM(s) Oral with dinner    ALLERGIES  No Known Allergies    VITALS  T(C): 36.7 (02-27-23 @ 11:47), Max: 36.9 (02-27-23 @ 04:31)  HR: 75 (02-27-23 @ 11:47) (71 - 90)  BP: 129/77 (02-27-23 @ 11:47) (105/66 - 129/77)  RR: 18 (02-27-23 @ 11:47) (18 - 18)  SpO2: 97% (02-27-23 @ 11:47) (96% - 98%)  Wt(kg): --    PHYSICAL EXAM  Constitutional - NAD, Comfortable  HEENT - NCAT, EOMI  Neck - Supple, No limited ROM  Chest - CTA bilaterally, No wheeze, No rhonchi, No crackles  Cardiovascular - RRR, S1S2, No murmurs  Abdomen - BS+, Soft, NTND  Extremities - No C/C/E, No calf tenderness   Neurologic Exam -                    Cognitive - Awake, Alert, AAO to self, place, date, year, situation     Communication - Fluent, No dysarthria, no aphasia     Cranial Nerves - CN 2-12 intact     Motor - No focal deficits      Sensory - Intact to LT     Reflexes - DTR Intact, No primitive reflexive  Psychiatric - Mood stable, Affect WNL    RECENT LABS/IMAGING  CBC Full  -  ( 27 Feb 2023 06:42 )  WBC Count : 13.74 K/uL  RBC Count : 3.59 M/uL  Hemoglobin : 11.4 g/dL  Hematocrit : 35.3 %  Platelet Count - Automated : 146 K/uL  Mean Cell Volume : 98.3 fl  Mean Cell Hemoglobin : 31.8 pg  Mean Cell Hemoglobin Concentration : 32.3 gm/dL  Auto Neutrophil # : x  Auto Lymphocyte # : x  Auto Monocyte # : x  Auto Eosinophil # : x  Auto Basophil # : x  Auto Neutrophil % : x  Auto Lymphocyte % : x  Auto Monocyte % : x  Auto Eosinophil % : x  Auto Basophil % : x    02-27    141  |  103  |  30<H>  ----------------------------<  115<H>  4.1   |  28  |  1.05    Ca    9.5      27 Feb 2023 06:41    Impression:  88 yo with functional deficits secondary to diagnosis of CVA, Pubic Rami fx    Plan:  PT- ROM, Bed Mob, Transfers, Amb w AD and bracing as needed  OT- ADLs, bracing  SLP- Dysphagia eval and treat  Prec- Falls, Cardiac  DVT Prophylaxis- rivaroxaban   Skin- Turn q2 h  Dispo-  Patient is a 89y old  Female who presents with a chief complaint of fall; pubic rami fracture (27 Feb 2023 12:51)    Admission HPI:  Patient is an 89 year old female from home AAO x3, with PMH of Afib on xarelto, CAD, HTN, HLD and history of right ankle fracture s/p repair 9 months ago, who presented to the ED s/p fall at home. Patient states that earlier this morning, she was crouching down to  something and her right leg gave out and patient had fallen. Patient unable to get up and was afraid she would fall again if she got up. States she had fallen mainly on her right side. Denies any LOC or hitting head. States that she has been having problems with her leg ever since she had her ankle surgery about 9 months ago. Denies any pain in LLE.     Of note, patient was noted to have right upper extremity weakness and facial droop. Patient was unaware of facial droop but daughter at bedside confirmed noticing right facial droop with some slurred speech. Denies any confusion or LOC at the time. Symptoms all currently resolved. Denies any history of symptoms similar in the past. Denies any history of CVA. History of CVA in her mother.  (24 Feb 2023 17:14)    Interval History:  Patient diagnosed w bl pubic rami fx- evaluated by ortho- no surgical intervention.  Course complicated by R sided weakness.  RRT called.  Had imaging  CT Brain Stroke Protocol (02.25.23 @ 00:36) >  No large territory acute infarct, intracranial hemorrhage, or mass effect.    MR Head No Cont (02.25.23 @ 09:53) >  Acute infarct involving the left posterior putamen and frontal corona   radiata. No acute intracranial hemorrhage.    REVIEW OF SYSTEMS: R weakness, + pelvic pain, No chest pain, shortness of breath, nausea, vomiting or diarhea; other ROS neg     PAST MEDICAL & SURGICAL HISTORY  HTN (hypertension)  Hyperlipidemia  CAD (coronary artery disease)  MVP (mitral valve prolapse)  HTN (hypertension)  Chronic atrial fibrillation  History of ankle surgery    FUNCTIONAL HISTORY:   Lives alone, PTA Independent    CURRENT FUNCTIONAL STATUS:  Mod A transfers and gait    FAMILY HISTORY   Family history of CVA (Mother)    MEDICATIONS   acetaminophen     Tablet .. 975 milliGRAM(s) Oral every 6 hours  atorvastatin 80 milliGRAM(s) Oral at bedtime  chlorhexidine 2% Cloths 1 Application(s) Topical daily  morphine  - Injectable 1 milliGRAM(s) IV Push every 6 hours PRN  rivaroxaban 15 milliGRAM(s) Oral with dinner    ALLERGIES  No Known Allergies    VITALS  T(C): 36.7 (02-27-23 @ 11:47), Max: 36.9 (02-27-23 @ 04:31)  HR: 75 (02-27-23 @ 11:47) (71 - 90)  BP: 129/77 (02-27-23 @ 11:47) (105/66 - 129/77)  RR: 18 (02-27-23 @ 11:47) (18 - 18)  SpO2: 97% (02-27-23 @ 11:47) (96% - 98%)  Wt(kg): --    PHYSICAL EXAM  Constitutional - NAD, Comfortable  HEENT - NCAT, EOMI  Neck - Supple, No limited ROM  Chest - CTA bilaterally, No wheeze, No rhonchi, No crackles  Cardiovascular - RRR, S1S2, No murmurs  Abdomen - BS+, Soft, NTND  Extremities - No C/C/E, No calf tenderness   Neurologic Exam -                 R facial droop  RUE/RLE -0/5; LUE/LLE 4+/5  Speech dysarthric but intelligible and appropriate  No clonus  Psychiatric - Mood stable, Affect WNL    RECENT LABS/IMAGING  CBC Full  -  ( 27 Feb 2023 06:42 )  WBC Count : 13.74 K/uL  RBC Count : 3.59 M/uL  Hemoglobin : 11.4 g/dL  Hematocrit : 35.3 %  Platelet Count - Automated : 146 K/uL  Mean Cell Volume : 98.3 fl  Mean Cell Hemoglobin : 31.8 pg  Mean Cell Hemoglobin Concentration : 32.3 gm/dL  Auto Neutrophil # : x  Auto Lymphocyte # : x  Auto Monocyte # : x  Auto Eosinophil # : x  Auto Basophil # : x  Auto Neutrophil % : x  Auto Lymphocyte % : x  Auto Monocyte % : x  Auto Eosinophil % : x  Auto Basophil % : x    02-27    141  |  103  |  30<H>  ----------------------------<  115<H>  4.1   |  28  |  1.05    Ca    9.5      27 Feb 2023 06:41    Impression:  89 yo with functional deficits secondary to diagnosis of CVA, Pubic Rami fx    Plan:  PT- ROM, Bed Mob, Transfers, Amb w AD and bracing as needed  OT- ADLs, bracing  SLP- Dysphagia eval and treat  Prec- Falls, Cardiac  DVT Prophylaxis- rivaroxaban   Skin- Turn q2 h  Dispo- Acute Rehab- can tolerate 3h/d of therapies and requires daily physician visits

## 2023-02-28 NOTE — SWALLOW BEDSIDE ASSESSMENT ADULT - COMMENTS
Neurology consult: CT head: neg. CTA/P: neg. Impression:  acute R arm drift and R facial droop. NIHSS 2. Repeat CTH/A/P unremarkable. DDx ischemic stroke, TIA, toxic/metabolic/infectious.  Occupational therapy evaluation: Scored 2 error points out of 28 on Short Blessed Test (cog screen) indicating normal cognition. Recommend acute rehab.     2/24 Initial dysphagia screen at 14:30: Fail due to inability to control saliva  2/24 Second dysphagia screen at 16:30: Pass, initiated on regular diet    2/24 CXR IMPRESSION: Clear lungs  2/25 MRI HEAD IMPRESSION: Acute infarct involving the left posterior putamen and frontal corona radiata. No acute intracranial hemorrhage. Report also sent to the referring providers via [employer] teams message.    SWALLOW HISTORY: No reports in SCM or in PACS prior to this admission.

## 2023-02-28 NOTE — PROGRESS NOTE ADULT - ASSESSMENT
Echo 4/6/21: Nml LV fxn EF 61%    A/P  89 year old female from home AAO x3, with PMH of Afib on xarelto, CAD, HTN, HLD and history of right ankle fracture s/p repair 9 months ago, who presented to the ED s/p fall at home, found to have b/l pubic rami fx with hematoma and acute infarct on MRI.    #Acute CVA  -Likely cause of fall given reported hx and no cardiac prodrome sx  -Neuro following  -Continue xarelto  -Continue atorvastatin  -Prior echo with nml lv fxn  -F/u echo   -Pending CT CAP to r/o malignancy    #Afib  -NSR on tele  -Continue metoprolol 25mg qd  -Continue xarelto  -F/u echo    #CAD  -Continue atorvastatin    #HTN  -Continue metoprolol  -Can resume losartan  -Hold lisinopril     #Pubic rami fx w/ hematoma  -Per ortho no surgical intervention  -Monitor h/h while on xarelto      D/w acp  Please call/text me with any questions/concerns between 8am-4pm  [phone]

## 2023-02-28 NOTE — SWALLOW BEDSIDE ASSESSMENT ADULT - ASR SWALLOW ASPIRATION MONITOR
Monitor for s/s aspiration/laryngeal penetration. If noted:  D/C p.o. intake, provide non-oral nutrition/hydration/meds, and contact this service @ [phone]/change of breathing pattern/cough/gurgly voice/fever/pneumonia/throat clearing/upper respiratory infection

## 2023-02-28 NOTE — SWALLOW BEDSIDE ASSESSMENT ADULT - SLP GENERAL OBSERVATIONS
Pt encountered upright in pierre chair, on room air, + RUE weakness/Mild Rt facial droop, awake/alert, Aox4. Vocal quality slightly hypophonic (pt endorsed stronger this AM prior to receiving Oxycodone 1.5 hours ago). Improves with effort. + mild-moderate dysarthria. Pt able to follow multistep directives and answer open ended questions.

## 2023-02-28 NOTE — SPEECH LANGUAGE PATHOLOGY EVALUATION - VOICE
pt endorsed stronger in AM prior to receiving Oxycodone. Consider ENT consult if vocal quality does not improve./hypophonic

## 2023-02-28 NOTE — SWALLOW BEDSIDE ASSESSMENT ADULT - SWALLOW EVAL: DIAGNOSIS
Pt is an 88 y/o female who presented to the ED s/p fall at home, found to have acute fractures of bilateral pubic rami, course complicated by CVA. MRI Head revealed Acute infarct involving the left posterior putamen and frontal corona radiata. No acute intracranial hemorrhage. Pt p/w oropharyngeal dysphagia, likely acute related to CVA. Pt subjective c/o coughing intermittently with solids and liquids. Swallow sequence remarkable for prolonged mastication for regular solids d/t R sided facial weakness (pt chewing on L side) and c/o discomfort, timely Ap transport, mild latency in the swallow response, reduced hyolaryngeal elevation upon palpation, and multiple swallows for liquids (x3 for thin liquids, x2 for mildly thick liquids). No overt s/s aspiration observed across all consistencies administered, however pt endorsed mildly thick liquids "go down better".

## 2023-02-28 NOTE — SWALLOW BEDSIDE ASSESSMENT ADULT - SLP PERTINENT HISTORY OF CURRENT PROBLEM
truong is an 89 year old female from home AAO x3, with PMH of Afib on xarelto, CAD, HTN, HLD and history of right ankle fracture s/p repair 9 months ago, who presented to the ED s/p fall at home. WBC of 15K. Xray noted to show acute fractures of bilateral pubic rami. CT pelvis done showing acute fracture of superior and inferior pubic rami and right sacral ala. Hematoma in right lower pelvis. CXR reviewed; appears clear. Code stroke called in ED due to facial droop and right UE weakness. CT head, CTA brain and neck done with no acute abnormalities. RRT 2/25 for new onset of right sided weakness. Code stroke called at 11:52pm. NIHSS 2 upon stroke team arrival (RUE drift, R facial droop). Not a Teneceteplase candidate due to abnormal coag. Not a thrombectomy candidate due to lack of LVO. Patient is an 89 year old female from home AAO x3, with PMH of Afib on xarelto, CAD, HTN, HLD and history of right ankle fracture s/p repair 9 months ago, who presented to the ED s/p fall at home. WBC of 15K. Xray noted to show acute fractures of bilateral pubic rami. CT pelvis done showing acute fracture of superior and inferior pubic rami and right sacral ala. Hematoma in right lower pelvis. CXR reviewed; appears clear. Code stroke called in ED due to facial droop and right UE weakness. CT head, CTA brain and neck done with no acute abnormalities. RRT 2/25 for new onset of right sided weakness. Code stroke called at 11:52pm. NIHSS 2 upon stroke team arrival (RUE drift, R facial droop). Not a Teneceteplase candidate due to abnormal coag. Not a thrombectomy candidate due to lack of LVO.

## 2023-02-28 NOTE — SWALLOW BEDSIDE ASSESSMENT ADULT - SPECIFY REASON(S)
to subjectively assess swallow safety/function; r/o dysphagia. As per consult "code stroke, coughing with food per daughter"

## 2023-02-28 NOTE — SWALLOW BEDSIDE ASSESSMENT ADULT - ASR SWALLOW REFERRAL
Consider ENT if vocal quality does not improve. Pt endorsed voice stronger in the AM prior to receiving oxycodone 1.5 hour prior

## 2023-02-28 NOTE — SWALLOW BEDSIDE ASSESSMENT ADULT - SWALLOW EVAL: CURRENT DIET
RECORDS RECEIVED FROM: External   DATE RECEIVED: 4.20.20   NOTES STATUS DETAILS   OFFICE NOTE from referring provider Care Everywhere 2.17.2020 Dr. Nolasco, [facility]   OFFICE NOTE from other specialist Received 5/21/19 Office visit with Dr. Teodoro Hinson ([facility])   DISCHARGE SUMMARY from hospital N/A    OPERATIVE REPORT N/A    MEDICATION LIST Care Everywhere         ENDOSCOPY  Received  3/1/13, 2/20/13 & 12/17/04   COLONOSCOPY N/A    ERCP N/A    EUS N/A    STOOL TESTING N/A    PERTINENT LABS Care Everywhere    PATHOLOGY REPORTS (RELATED) N/A    IMAGING (CT, MRI, EGD) Care Everywhere 5.2.19 CT abdomen pelvis, HE  12.6.18 MR Pelvis, HE    *3/11/2020 Images are in PACS. -[facility]      REFERRAL INFORMATION    Date referral was placed: 2.17.2020   Date all records received: N/A   Date records were scanned into EPIC: N/A   Date records were sent to Provider to review: N/A   Date and recommendation received from provider:  LETTER SENT  SCHEDULE APPOINTMENT   Date patient was contacted to schedule: 3.4.2020     Action 3.5.2020 8:26 AM MJ   Action Taken Requested images from [facility]. Requested med recs and images from [facility]. Called PT in inquire about medical records. No answer, LVM.     Action Jolynn 4/3/20 2:27PM   Action Taken [facility] recs scanned in epic / Images in  PACS             Regular solids/thin liquids.

## 2023-02-28 NOTE — PROGRESS NOTE ADULT - SUBJECTIVE AND OBJECTIVE BOX
CARDIOLOGY FOLLOW UP - Dr. Booker  DATE OF SERVICE: 2/28/23    CC  No CP, SOB, palpitations.  Still w/ R sided weakness    REVIEW OF SYSTEMS:  CONSTITUTIONAL: No fever, weight loss, or fatigue  RESPIRATORY: No cough, wheezing, chills or hemoptysis; No Shortness of Breath  CARDIOVASCULAR: No chest pain, palpitations, passing out, dizziness, or leg swelling  GASTROINTESTINAL: No abdominal or epigastric pain. No nausea, vomiting, or hematemesis; No diarrhea or constipation. No melena or hematochezia.  VASCULAR: No edema     PHYSICAL EXAM:  T(C): 36.9 (02-28-23 @ 04:36), Max: 36.9 (02-28-23 @ 04:36)  HR: 86 (02-28-23 @ 04:36) (79 - 86)  BP: 118/67 (02-28-23 @ 04:36) (115/76 - 118/67)  RR: 18 (02-28-23 @ 04:36) (18 - 18)  SpO2: 100% (02-28-23 @ 04:36) (93% - 100%)  Wt(kg): --  I&O's Summary    27 Feb 2023 07:01  -  28 Feb 2023 07:00  --------------------------------------------------------  IN: 300 mL / OUT: 0 mL / NET: 300 mL        Appearance: Elderly female	  Cardiovascular: Normal S1 S2,RRR, No JVD, No murmurs  Respiratory: Lungs clear to auscultation b/l  Gastrointestinal:  Soft, Non-tender, + BS	  Extremities: Normal range of motion, No clubbing, cyanosis or edema.  MSK: +R sided weakness      Home Medications:  losartan 25 mg oral tablet: 1 tab(s) orally once a day (24 Feb 2023 17:17)  metoprolol succinate 25 mg oral tablet, extended release: 1 tab(s) orally once a day (at bedtime) (24 Feb 2023 17:17)  Xarelto 20 mg oral tablet: 1 tab(s) orally once a day (in the evening) (24 Feb 2023 17:17)      MEDICATIONS  (STANDING):  acetaminophen     Tablet .. 975 milliGRAM(s) Oral every 6 hours  atorvastatin 80 milliGRAM(s) Oral at bedtime  chlorhexidine 2% Cloths 1 Application(s) Topical daily  metoprolol succinate ER 25 milliGRAM(s) Oral daily  rivaroxaban 15 milliGRAM(s) Oral with dinner      TELEMETRY: Sinus 60-90s	    ECG:  	  RADIOLOGY:   DIAGNOSTIC TESTING:  [ ] Echocardiogram:  [ ]  Catheterization:  [ ] Stress Test:    OTHER: 	    LABS:	 	    Troponin T, High Sensitivity Result: 355 ng/L [0 - 51] (02-25 @ 07:29)  Troponin T, High Sensitivity Result: 308 ng/L [0 - 51] (02-25 @ 00:08)                          10.7   10.66 )-----------( 141      ( 28 Feb 2023 06:59 )             33.0     02-27    141  |  103  |  30<H>  ----------------------------<  115<H>  4.1   |  28  |  1.05    Ca    9.5      27 Feb 2023 06:41

## 2023-02-28 NOTE — PROGRESS NOTE ADULT - ASSESSMENT
Patient is an 89 year old female from home AAO x3, with PMH of Afib on xarelto, CAD, HTN, HLD and history of right ankle fracture s/p repair 9 months ago, who presented to the ED s/p fall at home.     WBC of 15K. Xray noted to show acute fractures of bilateral pubic rami. CT pelvis done showing acute fracture of superior and inferior pubic rami and right sacral ala. Hematoma in right lower pelvis. CXR reviewed; appears clear. Code stroke called in ED due to facial droop and right UE weakness. CT head, CTA brain and neck done with no acute abnormalities.       Acute CVA:    PT f/up  ECHO  Cardio f/up appreciated    Dw family

## 2023-02-28 NOTE — SWALLOW BEDSIDE ASSESSMENT ADULT - PHARYNGEAL PHASE
mild latency in the swallow response, reduced hyolaryngeal elevation, no overt s/s aspiration. mild latency in the swallow response, reduced hyolaryngeal elevation upon palpation, and multiple swallows for liquids (x3 for thin liquids, x2 for mildly thick liquids). No overt s/s aspiration observed across all consistencies administered, however pt endorsed mildly thick liquids "goes down better"

## 2023-02-28 NOTE — SPEECH LANGUAGE PATHOLOGY EVALUATION - SPECIFY REASON(S)
To assess functional communication in all settings. As per consult "code stroke, coughing with food per daughter"

## 2023-02-28 NOTE — SPEECH LANGUAGE PATHOLOGY EVALUATION - SLP DIAGNOSIS
Pt is an 88 y/o female who presented to the ED s/p fall at home, found to have acute fractures of bilateral pubic rami, course complicated by CVA. MRI Head revealed Acute infarct involving the left posterior putamen and frontal corona radiata. No acute intracranial hemorrhage. Pt p/w grossly intact expressive language, receptive language, and cognitive-communication skills for higher level tasks. Motor speech impairment as evidenced by mild-moderate dysarthria characterized by reduced articulatory precision and reduced rate of speech at phrase-conversational level.

## 2023-02-28 NOTE — PROGRESS NOTE ADULT - SUBJECTIVE AND OBJECTIVE BOX
Patient is a 89y old  Female who presents with a chief complaint of fall; pubic rami fracture (28 Feb 2023 12:27)      SUBJECTIVE / OVERNIGHT EVENTS:    Events noted. Pelvic pain  CONSTITUTIONAL: No fever,  or fatigue  RESPIRATORY: No cough, wheezing,  No shortness of breath  CARDIOVASCULAR: No chest pain, palpitations, dizziness, or leg swelling  GASTROINTESTINAL: No abdominal or epigastric pain.       MEDICATIONS  (STANDING):  acetaminophen     Tablet .. 975 milliGRAM(s) Oral every 6 hours  atorvastatin 80 milliGRAM(s) Oral at bedtime  chlorhexidine 2% Cloths 1 Application(s) Topical daily  metoprolol succinate ER 25 milliGRAM(s) Oral daily  rivaroxaban 15 milliGRAM(s) Oral with dinner    MEDICATIONS  (PRN):  oxyCODONE    IR 2.5 milliGRAM(s) Oral every 4 hours PRN Moderate Pain (4 - 6)        CAPILLARY BLOOD GLUCOSE        I&O's Summary    27 Feb 2023 07:01  -  28 Feb 2023 07:00  --------------------------------------------------------  IN: 300 mL / OUT: 0 mL / NET: 300 mL        T(C): 37.2 (02-28-23 @ 20:45), Max: 37.2 (02-28-23 @ 20:45)  HR: 87 (02-28-23 @ 20:45) (78 - 87)  BP: 127/72 (02-28-23 @ 20:45) (118/67 - 144/79)  RR: 18 (02-28-23 @ 20:45) (18 - 18)  SpO2: 97% (02-28-23 @ 20:45) (95% - 100%)    PHYSICAL EXAM:    NECK: Supple, No JVD  CHEST/LUNG: Clear to auscultation bilaterally; No wheezing.  HEART: Regular rate and rhythm; No murmurs, rubs, or gallops  ABDOMEN: Soft, Nontender, Nondistended; Bowel sounds present  EXTREMITIES:   No edema  NEUROLOGY: AAO       LABS:                        10.7   10.66 )-----------( 141      ( 28 Feb 2023 06:59 )             33.0     02-27    141  |  103  |  30<H>  ----------------------------<  115<H>  4.1   |  28  |  1.05    Ca    9.5      27 Feb 2023 06:41              CAPILLARY BLOOD GLUCOSE            RADIOLOGY & ADDITIONAL TESTS:    Imaging Personally Reviewed:    Consultant(s) Notes Reviewed:      Care Discussed with Consultants/Other Providers:    Francisco Cheng MD, CMD, FACP    [id number] [place] [address]  Office Tel: [phone]  Cell: [phone]

## 2023-02-28 NOTE — SWALLOW BEDSIDE ASSESSMENT ADULT - NS ASR SWALLOW FINDINGS DISCUS
DEMI Mosqueda, patient. Attempted phone call to son (Internal medicine physician), no answer, message left void of PHI)

## 2023-02-28 NOTE — SPEECH LANGUAGE PATHOLOGY EVALUATION - COMMENTS
Neurology consult: CT head: neg. CTA/P: neg. Impression:  acute R arm drift and R facial droop. NIHSS 2. Repeat CTH/A/P unremarkable. DDx ischemic stroke, TIA, toxic/metabolic/infectious.  Occupational therapy evaluation: Scored 2 error points out of 28 on Short Blessed Test (cog screen) indicating normal cognition. Recommend acute rehab.     2/24 Initial dysphagia screen at 14:30: Fail due to inability to control saliva  2/24 Second dysphagia screen at 16:30: Pass, initiated on regular diet    2/24 CXR IMPRESSION: Clear lungs  2/25 MRI HEAD IMPRESSION: Acute infarct involving the left posterior putamen and frontal corona radiata. No acute intracranial hemorrhage. Report also sent to the referring providers via [employer] teams message.    SWALLOW HISTORY: No reports in SCM or in PACS prior to this admission. Grossly WFL as evidenced by ability to follow multistep directives, intact auditory comprehension for reading tasks, discourse, open ended questions, and R/L discrimination. Grossly WFL as evidenced by intact naming, repetition, fluency, and automatic speech. LIANA. Ronan jose, currently w/ VASILE bar Portions of Cerebellar Cognitive Affect Assessment conducted - pt w/ grossly intact short term recall, long term recall, sequencing, organization, problem solving, and attention.

## 2023-02-28 NOTE — SWALLOW BEDSIDE ASSESSMENT ADULT - ORAL PREPARATORY PHASE
prolonged mastication d/t R sided facial weakness (pt chewing on L side) and c/o discomfort. Mastication ability improved with SBS solids Within functional limits

## 2023-02-28 NOTE — SPEECH LANGUAGE PATHOLOGY EVALUATION - SLP PERTINENT HISTORY OF CURRENT PROBLEM
Patient is an 89 year old female from home AAO x3, with PMH of Afib on xarelto, CAD, HTN, HLD and history of right ankle fracture s/p repair 9 months ago, who presented to the ED s/p fall at home. WBC of 15K. Xray noted to show acute fractures of bilateral pubic rami. CT pelvis done showing acute fracture of superior and inferior pubic rami and right sacral ala. Hematoma in right lower pelvis. CXR reviewed; appears clear. Code stroke called in ED due to facial droop and right UE weakness. CT head, CTA brain and neck done with no acute abnormalities. RRT 2/25 for new onset of right sided weakness. Code stroke called at 11:52pm. NIHSS 2 upon stroke team arrival (RUE drift, R facial droop). Not a Teneceteplase candidate due to abnormal coag. Not a thrombectomy candidate due to lack of LVO.

## 2023-03-01 NOTE — PROGRESS NOTE ADULT - SUBJECTIVE AND OBJECTIVE BOX
Patient is a 89y old  Female who presents with a chief complaint of fall; pubic rami fracture (28 Feb 2023 12:27)      SUBJECTIVE / OVERNIGHT EVENTS: no events     Events noted. Pelvic pain  CONSTITUTIONAL: No fever,  or fatigue  RESPIRATORY: No cough, wheezing,  No shortness of breath  CARDIOVASCULAR: No chest pain, palpitations, dizziness, or leg swelling  GASTROINTESTINAL: No abdominal or epigastric pain.       MEDICATIONS  (STANDING):  acetaminophen     Tablet .. 975 milliGRAM(s) Oral every 6 hours  atorvastatin 80 milliGRAM(s) Oral at bedtime  chlorhexidine 2% Cloths 1 Application(s) Topical daily  metoprolol succinate ER 25 milliGRAM(s) Oral daily  rivaroxaban 15 milliGRAM(s) Oral with dinner    MEDICATIONS  (PRN):  oxyCODONE    IR 2.5 milliGRAM(s) Oral every 4 hours PRN Moderate Pain (4 - 6)        CAPILLARY BLOOD GLUCOSE        T(C): 36.7 (03-01-23 @ 12:16), Max: 36.7 (03-01-23 @ 12:16)  HR: 77 (03-01-23 @ 12:16) (77 - 77)  BP: 105/64 (03-01-23 @ 12:16) (105/64 - 105/64)  RR: 18 (03-01-23 @ 12:16) (18 - 18)  SpO2: 96% (03-01-23 @ 12:16) (96% - 96%)    CAPILLARY BLOOD GLUCOSE          MEDICATIONS  (STANDING):  acetaminophen     Tablet .. 975 milliGRAM(s) Oral every 6 hours  atorvastatin 80 milliGRAM(s) Oral at bedtime  chlorhexidine 2% Cloths 1 Application(s) Topical daily  losartan 25 milliGRAM(s) Oral daily  metoprolol succinate ER 25 milliGRAM(s) Oral daily  rivaroxaban 15 milliGRAM(s) Oral with dinner    MEDICATIONS  (PRN):  oxyCODONE    IR 2.5 milliGRAM(s) Oral every 4 hours PRN Moderate Pain (4 - 6)      PHYSICAL EXAM:    NECK: Supple, No JVD  CHEST/LUNG: Clear to auscultation bilaterally; No wheezing.  HEART: Regular rate and rhythm; No murmurs, rubs, or gallops  ABDOMEN: Soft, Nontender, Nondistended; Bowel sounds present  EXTREMITIES:   No edema  NEUROLOGY: AAO                           10.7   10.66 )-----------( 141      ( 28 Feb 2023 06:59 )             33.0                             CAPILLARY BLOOD GLUCOSE            RADIOLOGY & ADDITIONAL TESTS:    Imaging Personally Reviewed:    Consultant(s) Notes Reviewed:      Care Discussed with Consultants/Other Providers:

## 2023-03-01 NOTE — PROCEDURE NOTE - ADDITIONAL PROCEDURE DETAILS
Reason for interrogation: CVA  Date of implant: 5/20/21  Battery status: Good  Findings: 10 AF episodes. EGMs available shows tracing consistent with AF, longest duration 6 hours on 2/26/23. 17 tachy episodes that appear to be AF with T wave oversensing.  Patient with known history of AF  AF burden: 0.6%  Normal device function

## 2023-03-01 NOTE — PROGRESS NOTE ADULT - ASSESSMENT
Echo 4/6/21: Nml LV fxn EF 61%    A/P  89 year old female from home AAO x3, with PMH of Afib on xarelto, CAD, HTN, HLD and history of right ankle fracture s/p repair 9 months ago, who presented to the ED s/p fall at home, found to have b/l pubic rami fx with hematoma and acute infarct on MRI.    #Acute CVA  -Likely cause of fall given reported hx and no cardiac prodrome sx  -Neuro following  -Continue xarelto  -Continue atorvastatin  -Prior echo with nml lv fxn  -F/u echo   -F/u CTAP to r/o malignancy    #Afib  -NSR on tele  -Continue metoprolol 25mg qd  -Continue xarelto  -F/u echo    #CAD  -Continue atorvastatin    #HTN  -Continue metoprolol  -Can resume losartan  -Hold lisinopril    #Pubic rami fx w/ hematoma  -Per ortho no surgical intervention  -Monitor h/h while on xarelto      D/w acp  Please call/text me with any questions/concerns between 8am-4pm  [phone]

## 2023-03-01 NOTE — PROGRESS NOTE ADULT - SUBJECTIVE AND OBJECTIVE BOX
CARDIOLOGY FOLLOW UP - Dr. Booker  DATE OF SERVICE: 3/1/23    CC  No CV complaints.  Believes R sided weakness is worsening    REVIEW OF SYSTEMS:  CONSTITUTIONAL: No fever, weight loss, or fatigue  RESPIRATORY: No cough, wheezing, chills or hemoptysis; No Shortness of Breath  CARDIOVASCULAR: No chest pain, palpitations, passing out, dizziness, or leg swelling  GASTROINTESTINAL: No abdominal or epigastric pain. No nausea, vomiting, or hematemesis; No diarrhea or constipation. No melena or hematochezia.  VASCULAR: No edema     PHYSICAL EXAM:  T(C): 36.7 (03-01-23 @ 12:16), Max: 37.2 (02-28-23 @ 20:45)  HR: 77 (03-01-23 @ 12:16) (77 - 87)  BP: 105/64 (03-01-23 @ 12:16) (105/64 - 127/72)  RR: 18 (03-01-23 @ 12:16) (18 - 18)  SpO2: 96% (03-01-23 @ 12:16) (96% - 97%)  Wt(kg): --  I&O's Summary      Appearance: Normal	  Cardiovascular: Normal S1 S2,RRR, No JVD, No murmurs  Respiratory: Lungs clear to auscultation	  Gastrointestinal:  Soft, Non-tender, + BS	  Extremities: Normal range of motion, No clubbing, cyanosis or edema  MSK: +R sided hemiplegia    Home Medications:  losartan 25 mg oral tablet: 1 tab(s) orally once a day (24 Feb 2023 17:17)  metoprolol succinate 25 mg oral tablet, extended release: 1 tab(s) orally once a day (at bedtime) (24 Feb 2023 17:17)  Xarelto 20 mg oral tablet: 1 tab(s) orally once a day (in the evening) (24 Feb 2023 17:17)      MEDICATIONS  (STANDING):  acetaminophen     Tablet .. 975 milliGRAM(s) Oral every 6 hours  atorvastatin 80 milliGRAM(s) Oral at bedtime  chlorhexidine 2% Cloths 1 Application(s) Topical daily  metoprolol succinate ER 25 milliGRAM(s) Oral daily  rivaroxaban 15 milliGRAM(s) Oral with dinner      TELEMETRY: SR 80-90	    ECG:  	  RADIOLOGY:   DIAGNOSTIC TESTING:  [ ] Echocardiogram:  [ ]  Catheterization:  [ ] Stress Test:    OTHER: 	    LABS:	 	    Troponin T, High Sensitivity Result: 355 ng/L [0 - 51] (02-25 @ 07:29)  Troponin T, High Sensitivity Result: 308 ng/L [0 - 51] (02-25 @ 00:08)                          10.7   10.66 )-----------( 141      ( 28 Feb 2023 06:59 )             33.0

## 2023-03-02 NOTE — PROGRESS NOTE ADULT - ASSESSMENT
Patient is an 89 year old female from home AAO x3, with PMH of Afib on xarelto, CAD, HTN, HLD and history of right ankle fracture s/p repair 9 months ago, who presented to the ED s/p fall at home.     WBC of 15K. Xray noted to show acute fractures of bilateral pubic rami. CT pelvis done showing acute fracture of superior and inferior pubic rami and right sacral ala. Hematoma in right lower pelvis. CXR reviewed; appears clear. Code stroke called in ED due to facial droop and right UE weakness. CT head, CTA brain and neck done with no acute abnormalities.       Acute  CVA , conformed w MR 2/25, RUE hemiparesis  TTE w PFO, cardiology following  ptn is on chronic AC w Xarelto  PT f/up  TTE reviewed does she need a DYLON?  PMR consult for possible acute rehab at [facility]  MBS done, on dysphagia diet

## 2023-03-02 NOTE — PROGRESS NOTE ADULT - ASSESSMENT
Echo 4/6/21: Nml LV fxn EF 61%  Echo 2/27/23: Mild anteroseptal hypokinesis EF 45-50%, Mild diastolic dysfxn, Patent foramen ovale    A/P  89 year old female from home AAO x3, with PMH of Afib on xarelto, CAD, HTN, HLD and history of right ankle fracture s/p repair 9 months ago, who presented to the ED s/p fall at home, found to have b/l pubic rami fx with hematoma and acute infarct on MRI.    #Acute CVA  -Likely cause of fall given reported hx and no cardiac prodrome sx  -Neuro following  -Continue xarelto  -Continue atorvastatin  -Prior echo with nml lv fxn  -Echo with PFO, anteroseptal hypokinesis  -F/u CTAP to r/o malignancy  -Would get a b/l LE duplex to r/o DVT  -F/u neurology  -If deemed to be embolic in nature then ?change xarelto    #Afib  -NSR on tele  -Continue metoprolol 25mg qd  -Continue xarelto  -Echo results as above    #CAD  -Continue atorvastatin    #HTN  -Continue metoprolol  -Continue losartan  -Hold lisinopril for hypotension    #Pubic rami fx w/ hematoma  -Per ortho no surgical intervention  -Monitor h/h while on xarelto      D/w acp  Please call/text me with any questions/concerns between 8am-4pm  [phone]

## 2023-03-02 NOTE — SWALLOW VFSS/MBS ASSESSMENT ADULT - ORAL PHASE
Delayed oral transit time/Residue in oral cavity/Incomplete tongue to palate contact/Uncontrolled bolus / spillover in marylin-pharynx Residue in oral cavity/Incomplete tongue to palate contact/Uncontrolled bolus / spillover in marylin-pharynx Residue in oral cavity/Incomplete tongue to palate contact/Uncontrolled bolus / spillover in hypopharynx Residue in oral cavity

## 2023-03-02 NOTE — PROGRESS NOTE ADULT - SUBJECTIVE AND OBJECTIVE BOX
Patient is a 89y old  Female who presents with a chief complaint of fall; pubic rami fracture (02 Mar 2023 16:16)      SUBJECTIVE / OVERNIGHT EVENTS: ptn is s/p MBS, appears lethargic, she will need speech therapy post CVA and is permitted to be on a dysphagia diet w mod thickened liquids    MEDICATIONS  (STANDING):  acetaminophen     Tablet .. 975 milliGRAM(s) Oral every 6 hours  atorvastatin 80 milliGRAM(s) Oral at bedtime  chlorhexidine 2% Cloths 1 Application(s) Topical daily  losartan 25 milliGRAM(s) Oral daily  metoprolol succinate ER 25 milliGRAM(s) Oral daily  rivaroxaban 15 milliGRAM(s) Oral with dinner    MEDICATIONS  (PRN):  oxyCODONE    IR 2.5 milliGRAM(s) Oral every 4 hours PRN Moderate Pain (4 - 6)      Vital Signs Last 24 Hrs  T(F): 98.3 (03-02-23 @ 11:44), Max: 98.6 (03-01-23 @ 21:09)  HR: 83 (03-02-23 @ 11:44) (83 - 87)  BP: 117/59 (03-02-23 @ 11:44) (102/58 - 117/59)  RR: 18 (03-02-23 @ 11:44) (18 - 18)  SpO2: 99% (03-02-23 @ 11:44) (95% - 99%)  Telemetry:   CAPILLARY BLOOD GLUCOSE        I&O's Summary    02 Mar 2023 07:01  -  02 Mar 2023 17:51  --------------------------------------------------------  IN: 240 mL / OUT: 450 mL / NET: -210 mL        PHYSICAL EXAM:  GENERAL: NAD, well-developed  HEAD:  Atraumatic, Normocephalic  EYES: EOMI, PERRLA, conjunctiva and sclera clear  NECK: Supple, No JVD  CHEST/LUNG: Clear to auscultation bilaterally; No wheeze  HEART: Regular rate and rhythm; No murmurs, rubs, or gallops  ABDOMEN: Soft, Nontender, Nondistended; Bowel sounds present  EXTREMITIES:  2+ Peripheral Pulses, No clubbing, cyanosis, or edema  PSYCH: AAOx3  NEUROLOGY: non-focal  SKIN: No rashes or lesions    LABS:                        10.3   9.78  )-----------( 144      ( 02 Mar 2023 05:58 )             30.9     03-02    137  |  103  |  36<H>  ----------------------------<  112<H>  4.2   |  23  |  0.90    Ca    8.9      02 Mar 2023 05:57

## 2023-03-02 NOTE — SWALLOW VFSS/MBS ASSESSMENT ADULT - DIAGNOSTIC IMPRESSIONS
Pt is an 89 y/o female who presented to the ED s/p fall at home, found to have acute fractures of bilateral pubic rami, course complicated by CVA. MRI Head revealed Acute infarct involving the left posterior putamen and frontal corona radiata. No acute intracranial hemorrhage. Pt presents w/ an oropharyngeal dysphagia. The oral phase of swallow is characterized by effortful mastication and moderate oral residue w/ soft/bite-sized solids. The pharyngeal phase of swallow is notable for deep penetration of mildly thick liquids during/after the swallow and silent aspiration of thin liquids. Strategies which improved airway protection w/ mildly thick liquids include chin tuck posture and swallow x2. Pt is an excellent candidate for dysphagia tx given acute CVA and stimulability for use of strategies. Consider ENT consult for hypophonia given acute CVA and silent aspiration.

## 2023-03-02 NOTE — SWALLOW VFSS/MBS ASSESSMENT ADULT - ORAL PREPARATORY PHASE
Functional Effortful mastication w/ soft/bite-sized solids; can upgrade subjectively at bedside as Pt improves

## 2023-03-02 NOTE — PROGRESS NOTE ADULT - SUBJECTIVE AND OBJECTIVE BOX
CARDIOLOGY FOLLOW UP - Dr. Booker  DATE OF SERVICE: 3/2/23    CC  No CV complaints  C/o R hip pain    REVIEW OF SYSTEMS:  CONSTITUTIONAL: No fever, weight loss, or fatigue  RESPIRATORY: No cough, wheezing, chills or hemoptysis; No Shortness of Breath  CARDIOVASCULAR: No chest pain, palpitations, passing out, dizziness, or leg swelling  GASTROINTESTINAL: No abdominal or epigastric pain. No nausea, vomiting, or hematemesis; No diarrhea or constipation. No melena or hematochezia.  VASCULAR: No edema     PHYSICAL EXAM:  T(C): 36.8 (03-02-23 @ 11:44), Max: 37 (03-01-23 @ 21:09)  HR: 83 (03-02-23 @ 11:44) (83 - 87)  BP: 117/59 (03-02-23 @ 11:44) (102/58 - 117/59)  RR: 18 (03-02-23 @ 11:44) (18 - 18)  SpO2: 99% (03-02-23 @ 11:44) (95% - 99%)  Wt(kg): --  I&O's Summary    02 Mar 2023 07:01  -  02 Mar 2023 16:16  --------------------------------------------------------  IN: 240 mL / OUT: 450 mL / NET: -210 mL        Appearance: Elderly female	  Cardiovascular: Normal S1 S2,RRR, No JVD, No murmurs  Respiratory: Lungs clear to auscultation b/l  Gastrointestinal:  Soft, Non-tender, + BS	  Extremities: Normal range of motion, No clubbing, cyanosis or edema      Home Medications:  losartan 25 mg oral tablet: 1 tab(s) orally once a day (24 Feb 2023 17:17)  metoprolol succinate 25 mg oral tablet, extended release: 1 tab(s) orally once a day (at bedtime) (24 Feb 2023 17:17)  Xarelto 20 mg oral tablet: 1 tab(s) orally once a day (in the evening) (24 Feb 2023 17:17)      MEDICATIONS  (STANDING):  acetaminophen     Tablet .. 975 milliGRAM(s) Oral every 6 hours  atorvastatin 80 milliGRAM(s) Oral at bedtime  chlorhexidine 2% Cloths 1 Application(s) Topical daily  losartan 25 milliGRAM(s) Oral daily  metoprolol succinate ER 25 milliGRAM(s) Oral daily  rivaroxaban 15 milliGRAM(s) Oral with dinner      TELEMETRY: SR 60-90s    ECG:  	  RADIOLOGY:     DIAGNOSTIC TESTING:  [x] Echocardiogram:  < from: TTE with Doppler (w/Cont) (02.27.23 @ 16:41) >  Conclusions:  1. Normal left ventricular internal dimensions and wall  thicknesses.  2. Mild anteroseptal hypokinesis.  3. Global Longitudinal Strain -14.5% (Shell, Hr, 92,  /77)  4. The right ventricle is not well visualized; grossly  normal right ventricular systolic function.  5. Agitated saline injection demonstrates evidence of a  patent foramen ovale.  *** No previous Echo exam.    < end of copied text >    [ ]  Catheterization:  [ ] Stress Test:    OTHER: 	    LABS:	 	    Troponin T, High Sensitivity Result: 355 ng/L [0 - 51] (02-25 @ 07:29)  Troponin T, High Sensitivity Result: 308 ng/L [0 - 51] (02-25 @ 00:08)                          10.3   9.78  )-----------( 144      ( 02 Mar 2023 05:58 )             30.9     03-02    137  |  103  |  36<H>  ----------------------------<  112<H>  4.2   |  23  |  0.90    Ca    8.9      02 Mar 2023 05:57

## 2023-03-02 NOTE — SWALLOW VFSS/MBS ASSESSMENT ADULT - ROSENBEK'S PENETRATION ASPIRATION SCALE
(1) no aspiration, contrast does not enter airway (4) contrast contacts vocal cords, no residue remains (penetration) (8) contrast passes glottis, visible subglottic residue remains, absent patient response (aspiration)

## 2023-03-02 NOTE — SWALLOW VFSS/MBS ASSESSMENT ADULT - SLP GENERAL OBSERVATIONS
Pt received in radiology secure in RAGHAVENDRA chair. Pt is A&Ox3, verbally responsive, and able to follow simple commands. +RUE weakness and R-sided facial droop noted. Pt received in radiology secure in RAGHAVENDRA chair. Pt is A&Ox3, verbally responsive, and able to follow simple commands. +RUE weakness and R-sided facial droop noted. +hypophonia noted at baseline and throughout this exam.

## 2023-03-02 NOTE — SWALLOW VFSS/MBS ASSESSMENT ADULT - SPECIFY REASON(S)
to subjectively assess swallow safety/function; r/o dysphagia. As per consult "code stroke, coughing with food per daughter" To objectively assess the swallow mechanism and r/o aspiration

## 2023-03-03 NOTE — CONSULT NOTE ADULT - SUBJECTIVE AND OBJECTIVE BOX
CC: hypophonia     HPI: 89 year old female from home AAO x3, with PMH of Afib on xarelto, CAD, HTN, HLD and history of right ankle fracture s/p repair 9 months ago, who presented to the ED s/p fall at home, during admission s/p CVA. ENT called for hypophonia since admission. PT w dysphagia, seen by speech provided special diet. PT denies odynophagia, sob, dyspnea, or inability to tolerated secretions       PAST MEDICAL & SURGICAL HISTORY:  HTN (hypertension)      Hyperlipidemia      CAD (coronary artery disease)  Non-obstructive- Had an angiogram years ago      MVP (mitral valve prolapse)      HTN (hypertension)      Chronic atrial fibrillation      History of ankle surgery        Allergies    No Known Allergies    Intolerances      MEDICATIONS  (STANDING):  acetaminophen     Tablet .. 975 milliGRAM(s) Oral every 6 hours  atorvastatin 80 milliGRAM(s) Oral at bedtime  chlorhexidine 2% Cloths 1 Application(s) Topical daily  losartan 25 milliGRAM(s) Oral daily  metoprolol succinate ER 25 milliGRAM(s) Oral daily    MEDICATIONS  (PRN):  oxyCODONE    IR 2.5 milliGRAM(s) Oral every 4 hours PRN Moderate Pain (4 - 6)  senna 2 Tablet(s) Oral at bedtime PRN Constipation      Social History: no tobacco, no etoh     Family history: Pt denies any sign FHx    ROS:   ENT: all negative except as noted in HPI   CV: denies palpitations  Pulm: denies SOB, cough, hemoptysis  GI: denies change in apetite, indigestion, n/v  : denies pertinent urinary symptoms, urgency  Neuro: denies numbness/tingling, loss of sensation  Psych: denies anxiety  MS: denies muscle weakness, instability  Heme: denies easy bruising or bleeding  Endo: denies heat/cold intolerance, excessive sweating  Vascular: denies LE edema    Vital Signs Last 24 Hrs  T(C): 36.7 (03 Mar 2023 11:26), Max: 37 (02 Mar 2023 21:01)  T(F): 98.1 (03 Mar 2023 11:26), Max: 98.6 (02 Mar 2023 21:01)  HR: 83 (03 Mar 2023 11:26) (69 - 90)  BP: 100/60 (03 Mar 2023 11:26) (92/56 - 113/71)  BP(mean): --  RR: 18 (03 Mar 2023 11:26) (18 - 18)  SpO2: 98% (03 Mar 2023 11:26) (96% - 98%)    Parameters below as of 03 Mar 2023 11:26  Patient On (Oxygen Delivery Method): room air                              10.0   9.83  )-----------( 157      ( 03 Mar 2023 06:48 )             32.0    03-03    139  |  104  |  39<H>  ----------------------------<  111<H>  4.1   |  24  |  0.88    Ca    9.0      03 Mar 2023 06:47         PHYSICAL EXAM:  Gen: NAD  Skin: No rashes, bruises, or lesions  Head: Normocephalic, Atraumatic  Face: no edema, erythema, or fluctuance. Parotid glands soft without mass  Eyes: no scleral injection  Nose: Nares bilaterally patent, no discharge  Mouth: No Stridor / Drooling / Trismus.  Mucosa moist, tongue/uvula midline, oropharynx clear  Neck: Flat, supple, no lymphadenopathy, trachea midline, no masses  Lymphatic: No lymphadenopathy  Resp: breathing easily, no stridor  CV: no peripheral edema/cyanosis  GI: nondistended   Peripheral vascular: no JVD or edema  Neuro: facial nerve intact, no facial droop        Fiberoptic Indirect laryngoscopy:  (Scope #2 used) Reason for Laryngoscopy:    Patient was unable to cooperate with mirror.  Nasopharynx, oropharynx, and hypopharynx clear, no bleeding. Tongue base, posterior pharyngeal wall, vallecula, epiglottis, and subglottis appear normal. No erythema, edema, pooling of secretions, masses or lesions. Airway patent, no foreign body visualized. No glottic/supraglottic edema. True vocal cords, arytenoids, vestibular folds, ventricles, pyriform sinuses, and aryepiglottic folds appear normal bilaterally. Vocal cords mobile with good contact b/l.  posterior arytenoid edema noted on indirect laryngoscopy, likely 2/2 GERD               IMAGING/ADDITIONAL STUDIES:

## 2023-03-03 NOTE — PROGRESS NOTE ADULT - SUBJECTIVE AND OBJECTIVE BOX
Patient is a 89y old  Female who presents with a chief complaint of fall; pubic rami fracture (02 Mar 2023 16:16)      SUBJECTIVE / OVERNIGHT EVENTS: ptn is s/p MBS, appears lethargic, she will need speech therapy post CVA and is permitted to be on a dysphagia diet w mod thickened liquids        T(C): 36.4 (03-03-23 @ 20:41), Max: 36.4 (03-03-23 @ 20:41)  HR: 78 (03-03-23 @ 20:41) (78 - 78)  BP: 96/61 (03-03-23 @ 20:41) (96/61 - 96/61)  RR: 18 (03-03-23 @ 20:41) (18 - 18)  SpO2: 96% (03-03-23 @ 20:41) (96% - 96%)      MEDICATIONS  (STANDING):  acetaminophen     Tablet .. 975 milliGRAM(s) Oral every 6 hours  apixaban 2.5 milliGRAM(s) Oral every 12 hours  atorvastatin 80 milliGRAM(s) Oral at bedtime  chlorhexidine 2% Cloths 1 Application(s) Topical daily  losartan 25 milliGRAM(s) Oral daily  metoprolol succinate ER 25 milliGRAM(s) Oral daily    MEDICATIONS  (PRN):  oxyCODONE    IR 2.5 milliGRAM(s) Oral every 4 hours PRN Moderate Pain (4 - 6)  senna 2 Tablet(s) Oral at bedtime PRN Constipation      PHYSICAL EXAM:  GENERAL: NAD, well-developed  HEAD:  Atraumatic, Normocephalic  EYES: EOMI, PERRLA, conjunctiva and sclera clear  NECK: Supple, No JVD  CHEST/LUNG: Clear to auscultation bilaterally; No wheeze  HEART: Regular rate and rhythm; No murmurs, rubs, or gallops  ABDOMEN: Soft, Nontender, Nondistended; Bowel sounds present  EXTREMITIES:  2+ Peripheral Pulses, No clubbing, cyanosis, or edema  PSYCH: AAOx3  NEUROLOGY: non-focal  SKIN: No rashes or lesions                              10.0   9.83  )-----------( 157      ( 03 Mar 2023 06:48 )             32.0               139|104|39<111  4.1|24|0.88  9.0,--,--  03-03 @ 06:47

## 2023-03-03 NOTE — PROGRESS NOTE ADULT - SUBJECTIVE AND OBJECTIVE BOX
CARDIOLOGY FOLLOW UP - Dr. Booker  DATE OF SERVICE:    CC      REVIEW OF SYSTEMS:  CONSTITUTIONAL: No fever, weight loss, or fatigue  RESPIRATORY: No cough, wheezing, chills or hemoptysis; No Shortness of Breath  CARDIOVASCULAR: No chest pain, palpitations, passing out, dizziness, or leg swelling  GASTROINTESTINAL: No abdominal or epigastric pain. No nausea, vomiting, or hematemesis; No diarrhea or constipation. No melena or hematochezia.  VASCULAR: No edema     PHYSICAL EXAM:  T(C): 36.7 (03-03-23 @ 11:26), Max: 37 (03-02-23 @ 21:01)  HR: 83 (03-03-23 @ 11:26) (69 - 90)  BP: 100/60 (03-03-23 @ 11:26) (92/56 - 113/71)  RR: 18 (03-03-23 @ 11:26) (18 - 18)  SpO2: 98% (03-03-23 @ 11:26) (96% - 98%)  Wt(kg): --  I&O's Summary    02 Mar 2023 07:01  -  03 Mar 2023 07:00  --------------------------------------------------------  IN: 240 mL / OUT: 450 mL / NET: -210 mL        Appearance: Normal	  Cardiovascular: Normal S1 S2,RRR, No JVD, No murmurs  Respiratory: Lungs clear to auscultation	  Gastrointestinal:  Soft, Non-tender, + BS	  Extremities: Normal range of motion, No clubbing, cyanosis or edema      Home Medications:  losartan 25 mg oral tablet: 1 tab(s) orally once a day (24 Feb 2023 17:17)  metoprolol succinate 25 mg oral tablet, extended release: 1 tab(s) orally once a day (at bedtime) (24 Feb 2023 17:17)  Xarelto 20 mg oral tablet: 1 tab(s) orally once a day (in the evening) (24 Feb 2023 17:17)      MEDICATIONS  (STANDING):  acetaminophen     Tablet .. 975 milliGRAM(s) Oral every 6 hours  atorvastatin 80 milliGRAM(s) Oral at bedtime  chlorhexidine 2% Cloths 1 Application(s) Topical daily  losartan 25 milliGRAM(s) Oral daily  metoprolol succinate ER 25 milliGRAM(s) Oral daily  rivaroxaban 15 milliGRAM(s) Oral with dinner      TELEMETRY: SR 	    ECG:  	  RADIOLOGY:   < from: [facility] Duplex Lower Ext Vein Scan, Bilat (03.02.23 @ 21:37) >    IMPRESSION:  No evidence of deep venous thrombosis in either lower extremity.    < end of copied text >    DIAGNOSTIC TESTING:  [ ] Echocardiogram:  [ ]  Catheterization:  [ ] Stress Test:    OTHER: 	    LABS:	 	    Troponin T, High Sensitivity Result: 355 ng/L [0 - 51] (02-25 @ 07:29)  Troponin T, High Sensitivity Result: 308 ng/L [0 - 51] (02-25 @ 00:08)                          10.0   9.83  )-----------( 157      ( 03 Mar 2023 06:48 )             32.0     03-03    139  |  104  |  39<H>  ----------------------------<  111<H>  4.1   |  24  |  0.88    Ca    9.0      03 Mar 2023 06:47               CARDIOLOGY FOLLOW UP - Dr. Booker  DATE OF SERVICE: 3/3/2023    CC: no events      REVIEW OF SYSTEMS:  CONSTITUTIONAL: No fever, weight loss, or fatigue  RESPIRATORY: No cough, wheezing, chills or hemoptysis; No Shortness of Breath  CARDIOVASCULAR: No chest pain, palpitations, passing out, dizziness, or leg swelling  GASTROINTESTINAL: No abdominal or epigastric pain. No nausea, vomiting, or hematemesis; No diarrhea or constipation. No melena or hematochezia.  VASCULAR: No edema     PHYSICAL EXAM:  T(C): 36.7 (03-03-23 @ 11:26), Max: 37 (03-02-23 @ 21:01)  HR: 83 (03-03-23 @ 11:26) (69 - 90)  BP: 100/60 (03-03-23 @ 11:26) (92/56 - 113/71)  RR: 18 (03-03-23 @ 11:26) (18 - 18)  SpO2: 98% (03-03-23 @ 11:26) (96% - 98%)  Wt(kg): --  I&O's Summary    02 Mar 2023 07:01  -  03 Mar 2023 07:00  --------------------------------------------------------  IN: 240 mL / OUT: 450 mL / NET: -210 mL        Appearance: Normal	  Cardiovascular: Normal S1 S2,RRR, No JVD, No murmurs  Respiratory: Lungs clear to auscultation	  Gastrointestinal:  Soft, Non-tender, + BS	  Extremities: Normal range of motion, No clubbing, cyanosis or edema      Home Medications:  losartan 25 mg oral tablet: 1 tab(s) orally once a day (24 Feb 2023 17:17)  metoprolol succinate 25 mg oral tablet, extended release: 1 tab(s) orally once a day (at bedtime) (24 Feb 2023 17:17)  Xarelto 20 mg oral tablet: 1 tab(s) orally once a day (in the evening) (24 Feb 2023 17:17)      MEDICATIONS  (STANDING):  acetaminophen     Tablet .. 975 milliGRAM(s) Oral every 6 hours  atorvastatin 80 milliGRAM(s) Oral at bedtime  chlorhexidine 2% Cloths 1 Application(s) Topical daily  losartan 25 milliGRAM(s) Oral daily  metoprolol succinate ER 25 milliGRAM(s) Oral daily  rivaroxaban 15 milliGRAM(s) Oral with dinner      TELEMETRY: SR 	    ECG:  	  RADIOLOGY:   < from: [facility] Duplex Lower Ext Vein Scan, Bilat (03.02.23 @ 21:37) >    IMPRESSION:  No evidence of deep venous thrombosis in either lower extremity.    < end of copied text >    DIAGNOSTIC TESTING:  [ ] Echocardiogram:  [ ]  Catheterization:  [ ] Stress Test:    OTHER: 	    LABS:	 	    Troponin T, High Sensitivity Result: 355 ng/L [0 - 51] (02-25 @ 07:29)  Troponin T, High Sensitivity Result: 308 ng/L [0 - 51] (02-25 @ 00:08)                          10.0   9.83  )-----------( 157      ( 03 Mar 2023 06:48 )             32.0     03-03    139  |  104  |  39<H>  ----------------------------<  111<H>  4.1   |  24  |  0.88    Ca    9.0      03 Mar 2023 06:47

## 2023-03-03 NOTE — PROGRESS NOTE ADULT - ASSESSMENT
Patient is an 89 year old female from home AAO x3, with PMH of Afib on xarelto, CAD, HTN, HLD and history of right ankle fracture s/p repair 9 months ago, who presented to the ED s/p fall at home.     WBC of 15K. Xray noted to show acute fractures of bilateral pubic rami. CT pelvis done showing acute fracture of superior and inferior pubic rami and right sacral ala. Hematoma in right lower pelvis. CXR reviewed; appears clear. Code stroke called in ED due to facial droop and right UE weakness. CT head, CTA brain and neck done with no acute abnormalities.       Acute  CVA , conformed w MR 2/25, RUE hemiparesis  TTE w PFO, cardiology following  ptn is on chronic AC w Xarelto  PT f/up  TTE reviewed does she need a DYLON?  PMR consult for possible acute rehab at [facility]  MBS done, on dysphagia diet    D/W Neuro change Xarelto to Eliquis

## 2023-03-03 NOTE — CHART NOTE - NSCHARTNOTEFT_GEN_A_CORE
LE dopplers reviewed, no evidence of DVT. TTE shows PFO.    CTA HEAD/NECK:  No occlusion or flow-limiting stenosis. 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 Mild calcified plaque at the bilateral carotid bulbs, producing up to 40% stenosis of the proximal left ICA by NASCET criteria. No hemodynamically flow-limiting stenosis at the origin of the right ICA.    MR Head No Cont:  Acute infarct involving the left posterior putamen and frontal corona radiata. No acute intracranial hemorrhage.    Stroke mechanism may be cardioembolic secondary to atrial fibrillation. PFO likely incidental and no current evidence to close PFO.  Switch Xarelto to Eliquis (lower bleeding risk) - dose change for age/weight/kidney function  Continue Lipitor 80mg QHS  Follow up with Neurosurgery as outpatient for surveillance of aneurysm  A1C 5.2,   PT/OT    Discussed stroke fellow, Dr. Betsey Serrano.

## 2023-03-03 NOTE — CONSULT NOTE ADULT - ASSESSMENT
89y s/p fall and CVA with hypophonia since admission indirect laryngoscopy shows posterior arytenoid edema likely 2/2 GERD, airway patent, VC mobile

## 2023-03-03 NOTE — PROGRESS NOTE ADULT - ATTENDING COMMENTS
89 year old woman with h/o afib on xarelto, HTN, right ankle fracture, with functional deficits after CVA, right hemiparesis, dysphagia     MRI with acute infarct, left posterior putamen and frontal corona radiata. No acute intracranial hemorrhage.  bilateral pubic rami fracture, hematoma, WBAT  bowel regimen  pain oxycodone       3/2 SLP/MBS  oropharyngeal dysphagia  minced/moist with mod thick liquids    2/28 PT  bed mobility mod to max assist  transfers mod assist x 2  gait mod assist x 2, 3 steps    patient has been participating with therapy, requires intensive PT/OT/SLP to restore function, while being closely monitored and managed for ongoing medical issues which can destabilize     Recommend ACUTE inpatient rehabilitation for the functional deficits consisting of 3 hours of therapy/day & 24 hour RN/daily PMR physician for comorbid medical management. Will continue to follow for ongoing rehab needs and recommendations. Patient will be able to tolerate 3 hours a day.    Continue bedside therapy as well as OOB throughout the day with mobilization throughout the day with staff to maintain cardiopulmonary function and prevention of secondary complications related to debility.
See attneidng addendum on initial HPI.

## 2023-03-03 NOTE — PROGRESS NOTE ADULT - SUBJECTIVE AND OBJECTIVE BOX
Patient is a 89y old  Female who presents with a chief complaint of fall; pubic rami fracture (27 Feb 2023 12:51)    Admission HPI:  Patient is an 89 year old female from home AAO x3, with PMH of Afib on xarelto, CAD, HTN, HLD and history of right ankle fracture s/p repair 9 months ago, who presented to the ED s/p fall at home. Patient states that earlier this morning, she was crouching down to  something and her right leg gave out and patient had fallen. Patient unable to get up and was afraid she would fall again if she got up. States she had fallen mainly on her right side. Denies any LOC or hitting head. States that she has been having problems with her leg ever since she had her ankle surgery about 9 months ago. Denies any pain in LLE.     Of note, patient was noted to have right upper extremity weakness and facial droop. Patient was unaware of facial droop but daughter at bedside confirmed noticing right facial droop with some slurred speech. Denies any confusion or LOC at the time. Symptoms all currently resolved. Denies any history of symptoms similar in the past. Denies any history of CVA. History of CVA in her mother.  (24 Feb 2023 17:14)    Interval History:  Patient diagnosed w bl pubic rami fx- evaluated by ortho- no surgical intervention.  Course complicated by R sided weakness.  RRT called.  Had imaging  CT Brain Stroke Protocol (02.25.23 @ 00:36) >  No large territory acute infarct, intracranial hemorrhage, or mass effect.    MR Head No Cont (02.25.23 @ 09:53) >  Acute infarct involving the left posterior putamen and frontal corona   radiata. No acute intracranial hemorrhage.    Echo 4/6/21: Nml LV fxn EF 61%  Echo 2/27/23: Mild anteroseptal hypokinesis EF 45-50%, Mild diastolic dysfxn, Patent foramen ovale    3/2 cardiology notes with the following pending:  malignancy w/u  please ask neuro to see pt and comment on etiology, ? embolic ? xarelto failure   check le duplex r/o dvt.    Currently with dysphagia, hypophonia, and right sided hemiparesis.         REVIEW OF SYSTEMS: R weakness, + pelvic pain well controlled on oxy, No chest pain, shortness of breath, nausea, vomiting or diarrhea; other ROS neg     PAST MEDICAL & SURGICAL HISTORY  HTN (hypertension)  Hyperlipidemia  CAD (coronary artery disease)  MVP (mitral valve prolapse)  HTN (hypertension)  Chronic atrial fibrillation  History of ankle surgery    FUNCTIONAL HISTORY:   Lives alone, PTA Independent  Son nearby, pierre physician    CURRENT FUNCTIONAL STATUS:  Mod A bed mobility, transfers, and gait  Minced/moist and moderately thick liquids    FAMILY HISTORY   Family history of CVA (Mother)    MEDICATIONS   acetaminophen     Tablet .. 975 milliGRAM(s) Oral every 6 hours  atorvastatin 80 milliGRAM(s) Oral at bedtime  chlorhexidine 2% Cloths 1 Application(s) Topical daily  morphine  - Injectable 1 milliGRAM(s) IV Push every 6 hours PRN  rivaroxaban 15 milliGRAM(s) Oral with dinner    ALLERGIES  No Known Allergies    VITALS  T(C): 36.7 (02-27-23 @ 11:47), Max: 36.9 (02-27-23 @ 04:31)  HR: 75 (02-27-23 @ 11:47) (71 - 90)  BP: 129/77 (02-27-23 @ 11:47) (105/66 - 129/77)  RR: 18 (02-27-23 @ 11:47) (18 - 18)  SpO2: 97% (02-27-23 @ 11:47) (96% - 98%)  Wt(kg): --    PHYSICAL EXAM  Constitutional - NAD, Comfortable  HEENT - NCAT, EOMI  Neck - Supple, No limited ROM  Chest - no conversational dyspnea  Cardiovascular - warm and well perfused  Abdomen - NTTP ND, +BM today 3/3  Extremities - No C/C/E, No calf tenderness   Neurologic Exam - Alert and oriented to self, place, date, situation, R facial droop  RUE/RLE -0/5; LUE/LLE 4+/5.  Flaccid RUE.  MAS 1+ hamstrings RLE.  Speech hypophonic and mildly dysarthric but intelligible and appropriate  No clonus  Psychiatric - Mood stable, Affect WNL    RECENT LABS/IMAGING  CBC Full  -  ( 27 Feb 2023 06:42 )  WBC Count : 13.74 K/uL  RBC Count : 3.59 M/uL  Hemoglobin : 11.4 g/dL  Hematocrit : 35.3 %  Platelet Count - Automated : 146 K/uL  Mean Cell Volume : 98.3 fl  Mean Cell Hemoglobin : 31.8 pg  Mean Cell Hemoglobin Concentration : 32.3 gm/dL  Auto Neutrophil # : x  Auto Lymphocyte # : x  Auto Monocyte # : x  Auto Eosinophil # : x  Auto Basophil # : x  Auto Neutrophil % : x  Auto Lymphocyte % : x  Auto Monocyte % : x  Auto Eosinophil % : x  Auto Basophil % : x    02-27    141  |  103  |  30<H>  ----------------------------<  115<H>  4.1   |  28  |  1.05    Ca    9.5      27 Feb 2023 06:41    Impression:  89 yo with functional deficits secondary to diagnosis of CVA with current Pubic Rami fx.     Plan:  PT- ROM, Bed Mob, Transfers, Amb w AD and bracing as needed  OT- ADLs, bracing  SLP- Dysphagia eval and treat  Prec- Falls, Cardiac, aspiration  DVT Prophylaxis- rivaroxaban   Skin- Turn q2 h  Dispo- Acute Rehab for s/p CVA - can tolerate 3h/d of therapies and requires daily physician visits.

## 2023-03-04 NOTE — PROGRESS NOTE ADULT - ASSESSMENT
89 year old female from home AAO x3, with PMH of Afib on xarelto, CAD, HTN, HLD and history of right ankle fracture s/p repair 9 months ago, who presented to the ED s/p fall at home.     WBC of 15K on admission. Xray noted to show acute fractures of bilateral pubic rami. CT pelvis done showing acute fracture of superior and inferior pubic rami and right sacral ala. Hematoma in right lower pelvis. CXR reviewed; appears clear. Code stroke called in ED due to facial droop and right UE weakness. CT head, CTA brain and neck done with no acute abnormalities.       Acute  CVA , confirmed w MR 2/25, RUE hemiparesis, dysarthria and expressive aphasia  TTE w PFO, cardiology following  ptn is on chronic AC w Xarelto, however it appears to be a therapeutic failure, will change to Eliquis 5 mg bid. Neuro in agreement. cardiology in agreement  PT f/up  PMR consult done, awaiting AR  MBS done, on dysphagia diet

## 2023-03-04 NOTE — PROGRESS NOTE ADULT - SUBJECTIVE AND OBJECTIVE BOX
Patient is a 89y old  Female who presents with a chief complaint of fall; pubic rami fracture (04 Mar 2023 09:43)      SUBJECTIVE / OVERNIGHT EVENTS: long conversation with ptn's daughter and son in law about plan of care. all questions addressed and plan of care discussed. ptn gave consent at bedside. ptn has no c/o    MEDICATIONS  (STANDING):  acetaminophen     Tablet .. 975 milliGRAM(s) Oral every 6 hours  apixaban 2.5 milliGRAM(s) Oral every 12 hours  atorvastatin 80 milliGRAM(s) Oral at bedtime  chlorhexidine 2% Cloths 1 Application(s) Topical daily  losartan 25 milliGRAM(s) Oral daily  metoprolol succinate ER 25 milliGRAM(s) Oral daily    MEDICATIONS  (PRN):  oxyCODONE    IR 2.5 milliGRAM(s) Oral every 4 hours PRN Moderate Pain (4 - 6)  senna 2 Tablet(s) Oral at bedtime PRN Constipation      Vital Signs Last 24 Hrs  T(F): 98.4 (03-04-23 @ 11:30), Max: 98.4 (03-04-23 @ 11:30)  HR: 76 (03-04-23 @ 11:30) (76 - 85)  BP: 107/68 (03-04-23 @ 11:30) (96/61 - 122/75)  RR: 18 (03-04-23 @ 11:30) (18 - 19)  SpO2: 95% (03-04-23 @ 11:30) (90% - 98%)  Telemetry:   CAPILLARY BLOOD GLUCOSE        I&O's Summary    03 Mar 2023 07:01  -  04 Mar 2023 07:00  --------------------------------------------------------  IN: 800 mL / OUT: 200 mL / NET: 600 mL    04 Mar 2023 07:01  -  04 Mar 2023 17:14  --------------------------------------------------------  IN: 400 mL / OUT: 0 mL / NET: 400 mL        PHYSICAL EXAM:  GENERAL: NAD, well-developed  HEAD:  Atraumatic, Normocephalic  EYES: EOMI, PERRLA, conjunctiva and sclera clear  NECK: Supple, No JVD  CHEST/LUNG: Clear to auscultation bilaterally; No wheeze  HEART: Regular rate and rhythm; No murmurs, rubs, or gallops  ABDOMEN: Soft, Nontender, Nondistended; Bowel sounds present  EXTREMITIES:  2+ Peripheral Pulses, No clubbing, cyanosis, or edema  PSYCH: AAOx3  NEUROLOGY: non-focal  SKIN: No rashes or lesions    LABS:                        10.0   9.83  )-----------( 157      ( 03 Mar 2023 06:48 )             32.0     03-03    139  |  104  |  39<H>  ----------------------------<  111<H>  4.1   |  24  |  0.88    Ca    9.0      03 Mar 2023 06:47                RADIOLOGY & ADDITIONAL TESTS:    Imaging Personally Reviewed:    Consultant(s) Notes Reviewed:      Care Discussed with Consultants/Other Providers:

## 2023-03-04 NOTE — PROGRESS NOTE ADULT - SUBJECTIVE AND OBJECTIVE BOX
CARDIOLOGY FOLLOW UP - Dr. Booker  Date of Service: 3/4/23  CC: no events    Review of Systems:  Constitutional: No fever, weight loss, or fatigue  Respiratory: No cough, wheezing, or hemoptysis, no shortness of breath  Cardiovascular: No chest pain, palpitations, passing out, dizziness, or leg swelling  Gastrointestinal: No abd or epigastric pain. No nausea, vomiting, or hematemesis; no diarrhea or consiptaiton, no melena or hematochezia  Vascular: No edema     TELEMETRY:    PHYSICAL EXAM:  T(C): 36.7 (03-04-23 @ 04:37), Max: 36.7 (03-03-23 @ 11:26)  HR: 83 (03-04-23 @ 05:19) (78 - 85)  BP: 110/67 (03-04-23 @ 05:19) (96/61 - 122/75)  RR: 18 (03-04-23 @ 05:19) (18 - 19)  SpO2: 95% (03-04-23 @ 05:19) (90% - 98%)  Wt(kg): --  I&O's Summary    03 Mar 2023 07:01  -  04 Mar 2023 07:00  --------------------------------------------------------  IN: 800 mL / OUT: 200 mL / NET: 600 mL        Appearance: Normal	  Cardiovascular: Normal S1 S2,RRR, No JVD, No murmurs  Respiratory: Lungs clear to auscultation	  Gastrointestinal:  Soft, Non-tender, + BS	  Extremities: Normal range of motion, No clubbing, cyanosis or edema  Vascular: Peripheral pulses palpable 2+ bilaterally       Home Medications:  losartan 25 mg oral tablet: 1 tab(s) orally once a day (24 Feb 2023 17:17)  metoprolol succinate 25 mg oral tablet, extended release: 1 tab(s) orally once a day (at bedtime) (24 Feb 2023 17:17)  Xarelto 20 mg oral tablet: 1 tab(s) orally once a day (in the evening) (24 Feb 2023 17:17)        MEDICATIONS  (STANDING):  acetaminophen     Tablet .. 975 milliGRAM(s) Oral every 6 hours  apixaban 2.5 milliGRAM(s) Oral every 12 hours  atorvastatin 80 milliGRAM(s) Oral at bedtime  chlorhexidine 2% Cloths 1 Application(s) Topical daily  losartan 25 milliGRAM(s) Oral daily  metoprolol succinate ER 25 milliGRAM(s) Oral daily        EKG:  RADIOLOGY:  DIAGNOSTIC TESTING:  [ ] Echocardiogram:  [ ] Catherterization:  [ ] Stress Test:  OTHER:     LABS:	 	                          10.0   9.83  )-----------( 157      ( 03 Mar 2023 06:48 )             32.0     03-03    139  |  104  |  39<H>  ----------------------------<  111<H>  4.1   |  24  |  0.88    Ca    9.0      03 Mar 2023 06:47            CARDIAC MARKERS:

## 2023-03-04 NOTE — PROGRESS NOTE ADULT - ASSESSMENT
Echo 4/6/21: Nml LV fxn EF 61%  Echo 2/27/23: Mild anteroseptal hypokinesis EF 45-50%, Mild diastolic dysfxn, Patent foramen ovale    A/P  89 year old female from home AAO x3, with PMH of Afib on xarelto, CAD, HTN, HLD and history of right ankle fracture s/p repair 9 months ago, who presented to the ED s/p fall at home, found to have b/l pubic rami fx with hematoma and acute infarct on MRI.    #Acute CVA  -Likely cause of fall given reported hx and no cardiac prodrome sx  -Neuro following  -Continue xarelto  -Continue atorvastatin  -Prior echo with nml lv fxn  -Echo with PFO, anteroseptal hypokinesis  -LE duplex negative for DVT  -F/u neurology  -If deemed to be embolic in nature then ?change xarelto    #Afib  -NSR on tele  -Continue metoprolol 25mg qd  -Continue xarelto  -Echo results as above    #CAD  -Continue atorvastatin    #HTN  -Continue metoprolol  -Continue losartan  -Hold lisinopril for hypotension    #Pubic rami fx w/ hematoma  -Per ortho no surgical intervention  -Monitor h/h while on xarelto    35 minutes spent on total encounter; more than 50% of the visit was spent counseling and/or coordinating care by the attending physician.

## 2023-03-05 NOTE — PROGRESS NOTE ADULT - ASSESSMENT
Echo 4/6/21: Nml LV fxn EF 61%  Echo 2/27/23: Mild anteroseptal hypokinesis EF 45-50%, Mild diastolic dysfxn, Patent foramen ovale    A/P  89 year old female from home AAO x3, with PMH of Afib on xarelto, CAD, HTN, HLD and history of right ankle fracture s/p repair 9 months ago, who presented to the ED s/p fall at home, found to have b/l pubic rami fx with hematoma and acute infarct on MRI.    #Acute CVA  -Likely cause of fall given reported hx and no cardiac prodrome sx  -Neuro following  -Continue atorvastatin  -Prior echo with nml lv fxn  -Echo with PFO, anteroseptal hypokinesis  -LE duplex negative for DVT  -F/u neurology  -Likely xarelto failure  -Continue apixaban    #Afib  -NSR on tele  -Continue metoprolol 25mg qd  -Continue apixaban  -Echo results as above    #CAD  -Continue atorvastatin    #HTN  -Continue metoprolol  -Continue losartan  -Hold lisinopril for hypotension    #Pubic rami fx w/ hematoma  -Per ortho no surgical intervention  -Monitor h/h while on apixaban

## 2023-03-05 NOTE — DIETITIAN INITIAL EVALUATION ADULT - ADD RECOMMEND
1.  1. Continue PO diet with no therapeutic restrictions. Defer consistency to medical team, SLP.   2. Encourage and monitor PO intake. Encourage use of daily menus. Honor dietary preferences as expressed as able.   3. Recommend Ensure Plus High Protein (chocolate) 2x daily - thickened appropriately.   4. Recommend multivitamin (if no medication contraindications) to aid in prevention of micronutrient deficiencies.   5. Malnutrition sticker placed.   6. Monitor wt trends/labs/skin integrity/hydration status/bowel regularity.

## 2023-03-05 NOTE — PROGRESS NOTE ADULT - SUBJECTIVE AND OBJECTIVE BOX
Patient is a 89y old  Female who presents with a chief complaint of fall; pubic rami fracture (05 Mar 2023 11:11)      SUBJECTIVE / OVERNIGHT EVENTS: ptn has no new c/o, awaiting DC to [facility] , AC changed to ELiquis    MEDICATIONS  (STANDING):  acetaminophen     Tablet .. 975 milliGRAM(s) Oral every 6 hours  apixaban 5 milliGRAM(s) Oral two times a day  chlorhexidine 2% Cloths 1 Application(s) Topical daily  losartan 25 milliGRAM(s) Oral daily  metoprolol succinate ER 25 milliGRAM(s) Oral daily  polyethylene glycol 3350 17 Gram(s) Oral daily  rosuvastatin 40 milliGRAM(s) Oral at bedtime    MEDICATIONS  (PRN):  oxyCODONE    IR 2.5 milliGRAM(s) Oral every 4 hours PRN Moderate Pain (4 - 6)  senna 2 Tablet(s) Oral at bedtime PRN Constipation      Vital Signs Last 24 Hrs  T(F): 97.6 (03-05-23 @ 21:15), Max: 97.9 (03-05-23 @ 11:16)  HR: 76 (03-05-23 @ 21:15) (75 - 82)  BP: 101/63 (03-05-23 @ 21:15) (101/63 - 111/75)  RR: 18 (03-05-23 @ 21:15) (18 - 18)  SpO2: 96% (03-05-23 @ 21:15) (96% - 99%)  Telemetry:   CAPILLARY BLOOD GLUCOSE        I&O's Summary    04 Mar 2023 07:01  -  05 Mar 2023 07:00  --------------------------------------------------------  IN: 800 mL / OUT: 300 mL / NET: 500 mL    05 Mar 2023 07:01  -  05 Mar 2023 21:41  --------------------------------------------------------  IN: 300 mL / OUT: 0 mL / NET: 300 mL        PHYSICAL EXAM:  GENERAL: NAD, well-developed  HEAD:  Atraumatic, Normocephalic  EYES: EOMI, PERRLA, conjunctiva and sclera clear  NECK: Supple, No JVD  CHEST/LUNG: Clear to auscultation bilaterally; No wheeze  HEART: Regular rate and rhythm; No murmurs, rubs, or gallops  ABDOMEN: Soft, Nontender, Nondistended; Bowel sounds present  EXTREMITIES:  2+ Peripheral Pulses, No clubbing, cyanosis, or edema  PSYCH: AAOx3  NEUROLOGY: non-focal  SKIN: No rashes or lesions    LABS:                        10.0   11.59 )-----------( 196      ( 05 Mar 2023 06:08 )             31.3     03-05    138  |  103  |  40<H>  ----------------------------<  112<H>  4.4   |  24  |  0.83    Ca    9.2      05 Mar 2023 06:08                RADIOLOGY & ADDITIONAL TESTS:    Imaging Personally Reviewed:    Consultant(s) Notes Reviewed:      Care Discussed with Consultants/Other Providers:

## 2023-03-05 NOTE — DIETITIAN INITIAL EVALUATION ADULT - REASON FOR ADMISSION
Pt is an 89 yo F who presented s/p fall; found to have pubic rami fracture. PMH: A.Fib on Xarelto, CAD, HTN, hyperlipidemia, history of right ankle fracture s/p repair x 9 months ago. Orthopedics following; no plans for acute intervention at this time.      Pt is an 88 yo F who presented s/p fall; found to have pubic rami fracture. PMH: A.Fib on Xarelto, CAD, HTN, hyperlipidemia, history of right ankle fracture s/p repair x 9 months ago. Orthopedics following; no plans for acute intervention at this time. Course complicated by rapid response/code stroke on 2/25.

## 2023-03-05 NOTE — DIETITIAN INITIAL EVALUATION ADULT - SIGNS/SYMPTOMS
PO intake <75% x >/= 1 week; mild body fat and muscle mass depletion pt s/p MBS; recommended modified texture/consistency in setting of CVA

## 2023-03-05 NOTE — DIETITIAN INITIAL EVALUATION ADULT - OTHER INFO
Dosing wt (2/24): 120.8 lbs  Wt trend (3/3/22): 123 lbs; (4/11/22): 125 lbs; (3/28/22): 145.1 lbs; (3/10/22): 125 lbs Dosing wt (2/24): 120.8 lbs  Wt trend (3/3/22): 123 lbs; (4/11/22): 125 lbs; (3/10/22): 125 lbs  Pt reports UBW ~120 lbs. Appears consistent, will monitor.

## 2023-03-05 NOTE — DIETITIAN INITIAL EVALUATION ADULT - ORAL INTAKE PTA/DIET HISTORY
Pt reports fair appetite PTA; "depends on the day". Dietary preferences noted for pasta, salmon, soup, yogurt, cranberry juice. Pt unable to provide extensive history re: diet. Pt denies intolerance to chewing/swallowing PTA, however now on modified diet s/p stroke (see SLP note s/p MBS 3/2). Denies food allergies. Reports hx of taking vitamin D PTA; denies history of taking oral nutrition supplement at baseline. Amenable to trial Chocolate Ensure in-house.

## 2023-03-05 NOTE — DIETITIAN INITIAL EVALUATION ADULT - ENERGY INTAKE
Pt reports dislike of Minced and Moist diet; reviewed menu and preferences with pt. States family brought in Lentil soup and smoothie which she enjoyed./Fair (50-75%)

## 2023-03-05 NOTE — PROGRESS NOTE ADULT - SUBJECTIVE AND OBJECTIVE BOX
CARDIOLOGY FOLLOW UP - Dr. Booker  DATE OF SERVICE: 3/5/23    CC  No CV complaints    REVIEW OF SYSTEMS:  CONSTITUTIONAL: No fever, weight loss, or fatigue  RESPIRATORY: No cough, wheezing, chills or hemoptysis; No Shortness of Breath  CARDIOVASCULAR: No chest pain, palpitations, passing out, dizziness, or leg swelling  GASTROINTESTINAL: No abdominal or epigastric pain. No nausea, vomiting, or hematemesis; No diarrhea or constipation. No melena or hematochezia.  VASCULAR: No edema     PHYSICAL EXAM:  T(C): 36.6 (03-05-23 @ 04:19), Max: 36.9 (03-04-23 @ 11:30)  HR: 75 (03-05-23 @ 05:30) (73 - 81)  BP: 111/75 (03-05-23 @ 05:30) (105/72 - 111/75)  RR: 18 (03-05-23 @ 04:19) (18 - 18)  SpO2: 99% (03-05-23 @ 04:19) (95% - 99%)  Wt(kg): --  I&O's Summary    04 Mar 2023 07:01  -  05 Mar 2023 07:00  --------------------------------------------------------  IN: 800 mL / OUT: 300 mL / NET: 500 mL    05 Mar 2023 07:01  -  05 Mar 2023 11:12  --------------------------------------------------------  IN: 200 mL / OUT: 0 mL / NET: 200 mL        Appearance: Elderly female	  Cardiovascular: Normal S1 S2,RRR, No JVD, No murmurs  Respiratory: Lungs clear to auscultation b/l  Gastrointestinal:  Soft, Non-tender, + BS	  Extremities: Normal range of motion, No clubbing, cyanosis or edema      Home Medications:  losartan 25 mg oral tablet: 1 tab(s) orally once a day (24 Feb 2023 17:17)  metoprolol succinate 25 mg oral tablet, extended release: 1 tab(s) orally once a day (at bedtime) (24 Feb 2023 17:17)  Xarelto 20 mg oral tablet: 1 tab(s) orally once a day (in the evening) (24 Feb 2023 17:17)      MEDICATIONS  (STANDING):  acetaminophen     Tablet .. 975 milliGRAM(s) Oral every 6 hours  apixaban 5 milliGRAM(s) Oral two times a day  atorvastatin 80 milliGRAM(s) Oral at bedtime  chlorhexidine 2% Cloths 1 Application(s) Topical daily  losartan 25 milliGRAM(s) Oral daily  metoprolol succinate ER 25 milliGRAM(s) Oral daily      TELEMETRY: SR 60-80s	    ECG:  	  RADIOLOGY:   DIAGNOSTIC TESTING:  [ ] Echocardiogram:  [ ]  Catheterization:  [ ] Stress Test:    OTHER: 	    LABS:	 	                            10.0   11.59 )-----------( 196      ( 05 Mar 2023 06:08 )             31.3     03-05    138  |  103  |  40<H>  ----------------------------<  112<H>  4.4   |  24  |  0.83    Ca    9.2      05 Mar 2023 06:08

## 2023-03-05 NOTE — PROGRESS NOTE ADULT - ASSESSMENT
89 year old female from home AAO x3, with PMH of Afib on xarelto, CAD, HTN, HLD and history of right ankle fracture s/p repair 9 months ago, who presented to the ED s/p fall at home.     WBC of 15K on admission. Xray noted to show acute fractures of bilateral pubic rami. CT pelvis done showing acute fracture of superior and inferior pubic rami and right sacral ala. Hematoma in right lower pelvis. CXR reviewed; appears clear. Code stroke called in ED due to facial droop and right UE weakness. CT head, CTA brain and neck done with no acute abnormalities.       Acute  CVA , confirmed w MR 2/25, RUE hemiparesis, dysarthria and expressive aphasia  TTE w PFO, cardiology following  ptn was on chronic AC w Xarelto, however it appears to have been  a therapeutic failure, changed to Eliquis 5 mg bid. Neuro in agreement. cardiology in agreement  PT f/up  PMR consult done, awaiting [facility]  MBS done, on dysphagia diet

## 2023-03-05 NOTE — DIETITIAN INITIAL EVALUATION ADULT - ETIOLOGY
s/p stroke inadequate energy/protein intake in setting of decreased appetite, dislike of diet consistency

## 2023-03-05 NOTE — DIETITIAN INITIAL EVALUATION ADULT - PERTINENT MEDS FT
MEDICATIONS  (STANDING):  acetaminophen     Tablet .. 975 milliGRAM(s) Oral every 6 hours  apixaban 5 milliGRAM(s) Oral two times a day  atorvastatin 80 milliGRAM(s) Oral at bedtime  chlorhexidine 2% Cloths 1 Application(s) Topical daily  losartan 25 milliGRAM(s) Oral daily  metoprolol succinate ER 25 milliGRAM(s) Oral daily    MEDICATIONS  (PRN):  oxyCODONE    IR 2.5 milliGRAM(s) Oral every 4 hours PRN Moderate Pain (4 - 6)  senna 2 Tablet(s) Oral at bedtime PRN Constipation

## 2023-03-06 NOTE — PROGRESS NOTE ADULT - SUBJECTIVE AND OBJECTIVE BOX
CARDIOLOGY FOLLOW UP - Dr. Booker  DATE OF SERVICE: 3/6/23    CC  No CV complaints    REVIEW OF SYSTEMS:  CONSTITUTIONAL: No fever, weight loss, or fatigue  RESPIRATORY: No cough, wheezing, chills or hemoptysis; No Shortness of Breath  CARDIOVASCULAR: No chest pain, palpitations, passing out, dizziness, or leg swelling  GASTROINTESTINAL: No abdominal or epigastric pain. No nausea, vomiting, or hematemesis; No diarrhea or constipation. No melena or hematochezia.  VASCULAR: No edema     PHYSICAL EXAM:  T(C): 36.5 (03-06-23 @ 11:19), Max: 36.9 (03-06-23 @ 04:31)  HR: 74 (03-06-23 @ 11:19) (74 - 79)  BP: 88/64 (03-06-23 @ 11:19) (88/64 - 105/66)  RR: 18 (03-06-23 @ 11:19) (18 - 18)  SpO2: 97% (03-06-23 @ 11:19) (96% - 97%)  Wt(kg): --  I&O's Summary    05 Mar 2023 07:01  -  06 Mar 2023 07:00  --------------------------------------------------------  IN: 620 mL / OUT: 200 mL / NET: 420 mL    06 Mar 2023 07:01  -  06 Mar 2023 12:08  --------------------------------------------------------  IN: 120 mL / OUT: 0 mL / NET: 120 mL        Appearance: Elderly female	  Cardiovascular: Normal S1 S2,RRR, No JVD, No murmurs  Respiratory: Lungs clear to auscultation	  Gastrointestinal:  Soft, Non-tender, + BS	  Extremities: Normal range of motion, No clubbing, cyanosis or edema  Neuro: +R sided hemiparesis      Home Medications:  losartan 25 mg oral tablet: 1 tab(s) orally once a day (24 Feb 2023 17:17)  metoprolol succinate 25 mg oral tablet, extended release: 1 tab(s) orally once a day (at bedtime) (24 Feb 2023 17:17)  Xarelto 20 mg oral tablet: 1 tab(s) orally once a day (in the evening) (24 Feb 2023 17:17)      MEDICATIONS  (STANDING):  acetaminophen     Tablet .. 975 milliGRAM(s) Oral every 6 hours  apixaban 5 milliGRAM(s) Oral two times a day  chlorhexidine 2% Cloths 1 Application(s) Topical daily  metoprolol succinate ER 25 milliGRAM(s) Oral daily  polyethylene glycol 3350 17 Gram(s) Oral daily  rosuvastatin 40 milliGRAM(s) Oral at bedtime      TELEMETRY: SR 	    ECG:  	  RADIOLOGY:   DIAGNOSTIC TESTING:  [ ] Echocardiogram:  [ ]  Catheterization:  [ ] Stress Test:    OTHER: 	    LABS:	 	                            10.0   11.59 )-----------( 196      ( 05 Mar 2023 06:08 )             31.3     03-05    138  |  103  |  40<H>  ----------------------------<  112<H>  4.4   |  24  |  0.83    Ca    9.2      05 Mar 2023 06:08

## 2023-03-06 NOTE — PHARMACOTHERAPY INTERVENTION NOTE - COMMENTS
Counseled patient and daughter at bedside  on the following inpatient/discharge medications names (brand/generic), indication, and possible side effects:  Xarelto --> Eliquis 5mg BID for AF    Patient was provided with a medication card for their new medication.  Patient questions and concerns were answered and addressed. Patient demonstrated understanding.    Vesta Jerry, PharmD, [facility]  Clinical Pharmacy Specialist  [phone] or Teams

## 2023-03-06 NOTE — PROGRESS NOTE ADULT - ASSESSMENT
Echo 4/6/21: Nml LV fxn EF 61%  Echo 2/27/23: Mild anteroseptal hypokinesis EF 45-50%, Mild diastolic dysfxn, Patent foramen ovale    A/P  89 year old female from home AAO x3, with PMH of Afib on xarelto, CAD, HTN, HLD and history of right ankle fracture s/p repair 9 months ago, who presented to the ED s/p fall at home, found to have b/l pubic rami fx with hematoma and acute infarct on MRI.    #Acute CVA  -Likely cause of fall given reported hx and no cardiac prodrome sx  -Neuro following  -Continue atorvastatin  -Prior echo with nml lv fxn  -Echo with PFO, anteroseptal hypokinesis  -LE duplex negative for DVT  -F/u neurology  -Likely xarelto failure  -Continue apixaban    #Afib  -NSR on tele  -Continue metoprolol 25mg qd  -Continue apixaban  -Echo results as above    #CAD  -Continue atorvastatin    #HTN  -Continue metoprolol  -Continue losartan  -Hold lisinopril for hypotension  -If bp remains low, may also hold losartan     #Pubic rami fx w/ hematoma  -Per ortho no surgical intervention  -Monitor h/h while on apixaban      Please call/text me with questions/concerns between 8am-4pm  [phone]

## 2023-03-06 NOTE — PROGRESS NOTE ADULT - SUBJECTIVE AND OBJECTIVE BOX
daughter at bedside  patient complains of discomfort when sitting in chair     REVIEW OF SYSTEMS  Constitutional - No fever,  No fatigue  HEENT - No vertigo, No neck pain  Neurological - No headaches, No memory loss, No loss of strength, No numbness, No tremors  Skin - No rashes, No lesions   Musculoskeletal - No joint pain, No joint swelling, No muscle pain  Psychiatric - No depression, No anxiety    FUNCTIONAL PROGRESS  3/3 SLP  hypophonic   minced/moist/mod thick liquids     3/6 PT  bed mobility mod assist  transfers mod assist x 2  gait mod assist x 2, 3 steps     VITALS  T(C): 36.5 (03-06-23 @ 11:19), Max: 36.9 (03-06-23 @ 04:31)  HR: 74 (03-06-23 @ 11:19) (74 - 79)  BP: 88/64 (03-06-23 @ 11:19) (88/64 - 105/66)  RR: 18 (03-06-23 @ 11:19) (18 - 18)  SpO2: 97% (03-06-23 @ 11:19) (96% - 97%)  Wt(kg): --    MEDICATIONS   acetaminophen     Tablet .. 975 milliGRAM(s) every 6 hours  apixaban 5 milliGRAM(s) two times a day  chlorhexidine 2% Cloths 1 Application(s) daily  metoprolol succinate ER 25 milliGRAM(s) daily  oxyCODONE    IR 2.5 milliGRAM(s) every 4 hours PRN  polyethylene glycol 3350 17 Gram(s) daily  rosuvastatin 40 milliGRAM(s) at bedtime      RECENT LABS - Reviewed                        10.0   11.59 )-----------( 196      ( 05 Mar 2023 06:08 )             31.3     03-05    138  |  103  |  40<H>  ----------------------------<  112<H>  4.4   |  24  |  0.83    Ca    9.2      05 Mar 2023 06:08      PHYSICAL EXAM  Constitutional - NAD, Comfortable, in chair   Chest - breathing comfortably   Cardiovascular - warm and well perfused  Abdomen - NTTP ND  Extremities - No C/C/E, No calf tenderness   Neurologic Exam - Alert and oriented to self, place, date, situation, R facial droop  RUE/RLE -0/5; LUE/LLE 4+/5.  Flaccid RUE.  MAS 1+ hamstrings RLE.  Speech hypophonic and mildly dysarthric   No clonus  Psychiatric - Mood stable, Affect WNL      Impression   89 year old woman with h/o afib on xarelto, HTN, right ankle fracture, with functional deficits after CVA, right hemiparesis, dysphagia     MRI with acute infarct, left posterior putamen and frontal corona radiata. No acute intracranial hemorrhage.  bilateral pubic rami fracture, hematoma, WBAT  Low BP, decreased oral intake on thickened liquids, continue to monitor   bowel regimen  pain oxycodone     patient has been participating with therapy, requires intensive PT/OT/SLP to restore function, while being closely monitored and managed for ongoing medical issues which can destabilize     Recommend ACUTE inpatient rehabilitation for the functional deficits consisting of 3 hours of therapy/day & 24 hour RN/daily PMR physician for comorbid medical management. Will continue to follow for ongoing rehab needs and recommendations. Patient will be able to tolerate 3 hours a day.    Continue bedside therapy as well as OOB throughout the day with mobilization throughout the day with staff to maintain cardiopulmonary function and prevention of secondary complications related to debility.

## 2023-03-06 NOTE — PROGRESS NOTE ADULT - SUBJECTIVE AND OBJECTIVE BOX
Patient is a 89y old  Female who presents with a chief complaint of fall; pubic rami fracture (05 Mar 2023 11:11)      SUBJECTIVE / OVERNIGHT EVENTS: ptn has no new c/o, awaiting DC to [facility] , AC changed to ELiquis      T(C): 36.8 (03-06-23 @ 20:39), Max: 36.8 (03-06-23 @ 20:39)  HR: 84 (03-06-23 @ 20:39) (84 - 84)  BP: 93/56 (03-06-23 @ 20:39) (93/56 - 94/50)  RR: 18 (03-06-23 @ 20:39) (18 - 18)  SpO2: 98% (03-06-23 @ 20:39) (98% - 98%)      MEDICATIONS  (STANDING):  acetaminophen     Tablet .. 975 milliGRAM(s) Oral every 6 hours  apixaban 5 milliGRAM(s) Oral two times a day  chlorhexidine 2% Cloths 1 Application(s) Topical daily  metoprolol succinate ER 25 milliGRAM(s) Oral daily  polyethylene glycol 3350 17 Gram(s) Oral daily  rosuvastatin 40 milliGRAM(s) Oral at bedtime  sodium chloride 0.9%. 1000 milliLiter(s) (75 mL/Hr) IV Continuous <Continuous>    MEDICATIONS  (PRN):  oxyCODONE    IR 2.5 milliGRAM(s) Oral every 4 hours PRN Moderate Pain (4 - 6)      I&O's Summary    04 Mar 2023 07:01  -  05 Mar 2023 07:00  --------------------------------------------------------  IN: 800 mL / OUT: 300 mL / NET: 500 mL    05 Mar 2023 07:01  -  05 Mar 2023 21:41  --------------------------------------------------------  IN: 300 mL / OUT: 0 mL / NET: 300 mL        PHYSICAL EXAM:  GENERAL: NAD, well-developed  HEAD:  Atraumatic, Normocephalic  EYES: EOMI, PERRLA, conjunctiva and sclera clear  NECK: Supple, No JVD  CHEST/LUNG: Clear to auscultation bilaterally; No wheeze  HEART: Regular rate and rhythm; No murmurs, rubs, or gallops  ABDOMEN: Soft, Nontender, Nondistended; Bowel sounds present  EXTREMITIES:  2+ Peripheral Pulses, No clubbing, cyanosis, or edema  PSYCH: AAOx3  NEUROLOGY: non-focal  SKIN: No rashes or lesions    LABS:                        10.0   11.59 )-----------( 196      ( 05 Mar 2023 06:08 )             31.3     03-05    138  |  103  |  40<H>  ----------------------------<  112<H>  4.4   |  24  |  0.83    Ca    9.2      05 Mar 2023 06:08                RADIOLOGY & ADDITIONAL TESTS:    Imaging Personally Reviewed:    Consultant(s) Notes Reviewed:      Care Discussed with Consultants/Other Providers:

## 2023-03-07 PROBLEM — I48.20 CHRONIC ATRIAL FIBRILLATION, UNSPECIFIED: Chronic | Status: ACTIVE | Noted: 2023-01-01

## 2023-03-07 NOTE — PROGRESS NOTE ADULT - ASSESSMENT
Echo 4/6/21: Nml LV fxn EF 61%  Echo 2/27/23: Mild anteroseptal hypokinesis EF 45-50%, Mild diastolic dysfxn, Patent foramen ovale    A/P  89 year old female from home AAO x3, with PMH of Afib on xarelto, CAD, HTN, HLD and history of right ankle fracture s/p repair 9 months ago, who presented to the ED s/p fall at home, found to have b/l pubic rami fx with hematoma and acute infarct on MRI.    #Acute CVA  -Likely cause of fall given reported hx and no cardiac prodrome sx  -Neuro following  -Continue atorvastatin  -Prior echo with nml lv fxn  -Echo with PFO, anteroseptal hypokinesis  -LE duplex negative for DVT  -F/u neurology  -Likely xarelto failure  -Continue apixaban    #Afib  -NSR on tele  -Continue metoprolol 25mg qd  -Continue apixaban  -Echo results as above    #CAD  -Continue atorvastatin    #HTN  -Continue metoprolol  -Continue losartan  -Hold lisinopril for hypotension  -If bp remains low, may also hold losartan     #Pubic rami fx w/ hematoma  -Per ortho no surgical intervention  -Monitor h/h while on apixaban      35 minutes spent on total encounter; more than 50% of the visit was spent counseling and/or coordinating care by the attending physician.

## 2023-03-07 NOTE — DISCHARGE NOTE PROVIDER - PROVIDER TOKENS
PROVIDER:[TOKEN:[[id number]],FOLLOWUP:[1 week]],PROVIDER:[TOKEN:[[id number]]] PROVIDER:[TOKEN:[[id number]],FOLLOWUP:[1 week]],PROVIDER:[TOKEN:[[id number]]],PROVIDER:[TOKEN:[[id number]],FOLLOWUP:[1 week]]

## 2023-03-07 NOTE — DISCHARGE NOTE PROVIDER - NSDCMRMEDTOKEN_GEN_ALL_CORE_FT
losartan 25 mg oral tablet: 1 tab(s) orally once a day  metoprolol succinate 25 mg oral tablet, extended release: 1 tab(s) orally once a day (at bedtime)  Xarelto 20 mg oral tablet: 1 tab(s) orally once a day (in the evening)   acetaminophen 325 mg oral tablet: 3 tab(s) orally every 6 hours  apixaban 5 mg oral tablet: 1 tab(s) orally 2 times a day  metoprolol succinate 25 mg oral tablet, extended release: 1 tab(s) orally once a day (at bedtime)  Narcan 4 mg/0.1 mL nasal spray: nasal once a day, As Needed -3 minutes if no or minimal response.  . Naloxone spray 4 mg/0.1 ml intranasal, Spray 0.1 mL into one nostril. Repeat with second device into other nostril after 2-3 minutes if no or minimal response      oxyCODONE 5 mg oral tablet: 0.5 tab(s) orally every 6 hours, As Needed  polyethylene glycol 3350 oral powder for reconstitution: 17 gram(s) orally once a day  rosuvastatin 40 mg oral tablet: 1 tab(s) orally once a day (at bedtime)   acetaminophen 325 mg oral tablet: 3 tab(s) orally every 6 hours  apixaban 5 mg oral tablet: 1 tab(s) orally 2 times a day  metoprolol succinate 25 mg oral tablet, extended release: 3 tab(s) orally once a day  Narcan 4 mg/0.1 mL nasal spray: nasal once a day, As Needed -3 minutes if no or minimal response.  . Naloxone spray 4 mg/0.1 ml intranasal, Spray 0.1 mL into one nostril. Repeat with second device into other nostril after 2-3 minutes if no or minimal response      oxyCODONE 5 mg oral tablet: 0.5 tab(s) orally every 6 hours, As Needed  polyethylene glycol 3350 oral powder for reconstitution: 17 gram(s) orally once a day  rosuvastatin 40 mg oral tablet: 1 tab(s) orally once a day (at bedtime)   acetaminophen 325 mg oral tablet: 3 tab(s) orally every 6 hours  apixaban 5 mg oral tablet: 1 tab(s) orally 2 times a day  metoprolol succinate 25 mg oral tablet, extended release: 3 tab(s) orally once a day  Multiple Vitamins oral tablet: 1 tab(s) orally once a day  Narcan 4 mg/0.1 mL nasal spray: nasal once a day, As Needed -3 minutes if no or minimal response.  . Naloxone spray 4 mg/0.1 ml intranasal, Spray 0.1 mL into one nostril. Repeat with second device into other nostril after 2-3 minutes if no or minimal response      oxyCODONE 5 mg oral tablet: 0.5 tab(s) orally every 6 hours, As Needed  polyethylene glycol 3350 oral powder for reconstitution: 17 gram(s) orally once a day  rosuvastatin 40 mg oral tablet: 1 tab(s) orally once a day (at bedtime)

## 2023-03-07 NOTE — DISCHARGE NOTE PROVIDER - CARE PROVIDER_API CALL
Dewayne Parra)  Orthopaedic Surgery  [address], [address]  [place] [address]  Phone: [phone]  Fax: [phone]  Follow Up Time: 1 week    Lakeshia Wilson)  Neurology; Vascular Neurology  [address] [place] [address]  Phone: [phone]  Fax: [phone]  Follow Up Time:    Dewayne Parra)  Orthopaedic Surgery  [address], [address]  [place] [address]  Phone: [phone]  Fax: [phone]  Follow Up Time: 1 week    Lakeshia Wilson)  Neurology; Vascular Neurology  [address] [place] [address]  Phone: [phone]  Fax: [phone]  Follow Up Time:     Jordy Booker)  Cardiovascular Disease; Interventional Cardiology; Nuclear Cardiology  [address], [address]  [place] [address]  Phone: [phone]  Fax: [phone]  Follow Up Time: 1 week

## 2023-03-07 NOTE — DISCHARGE NOTE PROVIDER - DETAILS OF MALNUTRITION DIAGNOSIS/DIAGNOSES
This patient has been assessed with a concern for Malnutrition and was treated during this hospitalization for the following Nutrition diagnosis/diagnoses:     -  03/05/2023: Moderate protein-calorie malnutrition

## 2023-03-07 NOTE — PROGRESS NOTE ADULT - SUBJECTIVE AND OBJECTIVE BOX
CARDIOLOGY FOLLOW UP - Dr. Booker  Date of Service: 3/7/2023  CC: no events    Review of Systems:  Constitutional: No fever, weight loss, or fatigue  Respiratory: No cough, wheezing, or hemoptysis, no shortness of breath  Cardiovascular: No chest pain, palpitations, passing out, dizziness, or leg swelling  Gastrointestinal: No abd or epigastric pain. No nausea, vomiting, or hematemesis; no diarrhea or consiptaiton, no melena or hematochezia  Vascular: No edema     TELEMETRY:    PHYSICAL EXAM:  T(C): 36.7 (03-07-23 @ 11:03), Max: 36.8 (03-06-23 @ 20:39)  HR: 82 (03-07-23 @ 11:03) (82 - 89)  BP: 129/77 (03-07-23 @ 11:03) (93/56 - 129/77)  RR: 18 (03-07-23 @ 11:03) (18 - 18)  SpO2: 93% (03-07-23 @ 11:03) (93% - 98%)  Wt(kg): --  I&O's Summary    06 Mar 2023 07:01  -  07 Mar 2023 07:00  --------------------------------------------------------  IN: 600 mL / OUT: 850 mL / NET: -250 mL    07 Mar 2023 07:01  -  07 Mar 2023 13:12  --------------------------------------------------------  IN: 360 mL / OUT: 0 mL / NET: 360 mL        Appearance: Normal	  Cardiovascular: Normal S1 S2,RRR, No JVD, No murmurs  Respiratory: Lungs clear to auscultation	  Gastrointestinal:  Soft, Non-tender, + BS	  Extremities: Normal range of motion, No clubbing, cyanosis or edema  Vascular: Peripheral pulses palpable 2+ bilaterally       Home Medications:  acetaminophen 325 mg oral tablet: 3 tab(s) orally every 6 hours (07 Mar 2023 11:48)  apixaban 5 mg oral tablet: 1 tab(s) orally 2 times a day (07 Mar 2023 11:48)  metoprolol succinate 25 mg oral tablet, extended release: 1 tab(s) orally once a day (at bedtime) (24 Feb 2023 17:17)  Narcan 4 mg/0.1 mL nasal spray: nasal once a day, As Needed -3 minutes if no or minimal response.  . Naloxone spray 4 mg/0.1 ml intranasal, Spray 0.1 mL into one nostril. Repeat with second device into other nostril after 2-3 minutes if no or minimal response     (07 Mar 2023 11:56)  oxyCODONE 5 mg oral tablet: 0.5 tab(s) orally every 6 hours, As Needed (07 Mar 2023 11:56)  polyethylene glycol 3350 oral powder for reconstitution: 17 gram(s) orally once a day (07 Mar 2023 11:48)  rosuvastatin 40 mg oral tablet: 1 tab(s) orally once a day (at bedtime) (07 Mar 2023 11:48)        MEDICATIONS  (STANDING):  acetaminophen     Tablet .. 975 milliGRAM(s) Oral every 6 hours  apixaban 5 milliGRAM(s) Oral two times a day  chlorhexidine 2% Cloths 1 Application(s) Topical daily  metoprolol succinate ER 25 milliGRAM(s) Oral daily  polyethylene glycol 3350 17 Gram(s) Oral daily  rosuvastatin 40 milliGRAM(s) Oral at bedtime  sodium chloride 0.9%. 1000 milliLiter(s) (75 mL/Hr) IV Continuous <Continuous>        EKG:  RADIOLOGY:  DIAGNOSTIC TESTING:  [ ] Echocardiogram:  [ ] Catherterization:  [ ] Stress Test:  OTHER:     LABS:	 	                          10.5   13.31 )-----------( 239      ( 07 Mar 2023 07:04 )             32.7     03-07    137  |  104  |  43<H>  ----------------------------<  104<H>  4.5   |  23  |  0.87    Ca    8.9      07 Mar 2023 07:04            CARDIAC MARKERS:

## 2023-03-07 NOTE — DISCHARGE NOTE PROVIDER - HOSPITAL COURSE
89 year old female from home AAO x3, with PMH of Afib on xarelto, CAD, HTN, HLD and history of right ankle fracture s/p repair 9 months ago, who presented to the ED s/p fall at home.     WBC of 15K on admission. Xray noted to show acute fractures of bilateral pubic rami. CT pelvis done showing acute fracture of superior and inferior pubic rami and right sacral ala. Hematoma in right lower pelvis. CXR reviewed; appears clear.     Code stroke called in ED due to facial droop and right UE weakness. CT head, CTA brain and neck done with no acute abnormalities. Acute  CVA, confirmed w MR 2/25, RUE hemiparesis, dysarthria and expressive aphasia  TTE w PFO, cardiology following, no intervention     Pt was on chronic AC w Xarelto, however it appears to have been  a therapeutic failure, changed to Eliquis 5 mg bid. Neuro in agreement. cardiology in agreement    MBS done, on dysphagia diet    PMR consult done, Awaiting Acute Rehab     D/c held due to hypotension    89 year old female from home AAO x3, with PMH of Afib on xarelto, CAD, HTN, HLD and history of right ankle fracture s/p repair 9 months ago, who presented to the ED s/p fall at home.     WBC of 15K on admission. Xray noted to show acute fractures of bilateral pubic rami. CT pelvis done showing acute fracture of superior and inferior pubic rami and right sacral ala. Hematoma in right lower pelvis. CXR reviewed; appears clear.     Code stroke called in ED due to facial droop and right UE weakness.    Acute  CVA, confirmed w MR 2/25, RUE hemiparesis, dysarthria and expressive aphasia  TTE w PFO, cardiology following, no intervention   LE dopplers reviewed, no evidence of DVT. TTE shows PFO.    CTA HEAD/NECK:  No occlusion or flow-limiting stenosis. 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 Mild calcified plaque at the bilateral carotid bulbs, producing up to 40% stenosis of the proximal left ICA by NASCET criteria. No hemodynamically flow-limiting stenosis at the origin of the right ICA.  MR Head No Cont:  Acute infarct involving the left posterior putamen and frontal corona radiata. No acute intracranial hemorrhage.  Stroke mechanism may be cardioembolic secondary to atrial fibrillation. PFO likely incidental and no current evidence to close PFO.    Follow up with Neurosurgery as outpatient for surveillance of aneurysm      CVA with hypophonia since admission ENT consulted - indirect laryngoscopy shows posterior arytenoid edema likely 2/2 GERD, airway patent, VC mobile   MBS done, on dysphagia diet    Pt was on chronic AC w Xarelto, however it appears to have been  a therapeutic failure, changed to Eliquis 5 mg bid. Neuro in agreement. cardiology in agreement    D/c held due to hypotension - reassess need to resume Losartan     PMR consult done, Awaiting Acute Rehab      89 year old female from home AAO x3, with PMH of Afib on xarelto, CAD, HTN, HLD and history of right ankle fracture s/p repair 9 months ago, who presented to the ED s/p fall at home.     WBC of 15K on admission. Xray noted to show acute fractures of bilateral pubic rami. CT pelvis done showing acute fracture of superior and inferior pubic rami and right sacral ala. Hematoma in right lower pelvis. CXR reviewed; appears clear.     #Acute CVA with RUE Weakness,  dysarthria and expressive aphasia,  - Code stroke called in ED due to facial droop and right UE weakness.   - CTA HEAD/NECK: No occlusion or flow-limiting stenosis. 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 Mild calcified plaque at the bilateral carotid bulbs, producing up to 40% stenosis of the proximal left ICA by NASCET criteria. No hemodynamically flow-limiting stenosis at the origin of the right ICA.  - MR Head No Cont: Acute infarct involving the left posterior putamen and frontal corona radiata. No acute intracranial hemorrhage.  - Per Neuro Stroke mechanism may be cardioembolic secondary to atrial fibrillation. PFO likely incidental and no current evidence to close PFO.  - Pt was on chronic AC w Xarelto, however it appears to have been  a therapeutic failure, changed to Eliquis 5 mg bid.   - TTE with PFO, per cardiology no intervention   - Likely cause of fall given reported hx and no cardiac prodrome sx  - Continue atorvastatin  - Prior echo with nml lv fxn  - Echo with PFO, anteroseptal hypokinesis  - LE duplex negative for DVT  - CVA with hypophonia since admission ENT consulted - indirect laryngoscopy shows posterior arytenoid edema likely 2/2 GERD, airway patent, VC mobile  - MBS done, on dysphagia diet - moderately thickened liquids    # Brain Aneurysm  Follow up with Neurosurgery as outpatient for surveillance of aneurysm  MR Head No Cont: Acute infarct involving the left posterior putamen and frontal corona radiata. No acute intracranial hemorrhage.     # During hospital stay pt was found to be hypotensive - Losartan held - reassess need to resume Losartan     # Leukocytosis - On admission WBC was 15.5 which initially downtrended, now uptrended - f/u UA, CXR    PMR consult recommends Acute Rehab      89 year old female from home AAO x3, with PMH of Afib on xarelto, CAD, HTN, HLD and history of right ankle fracture s/p repair 9 months ago, who presented to the ED s/p fall at home.     WBC of 15K on admission. Xray noted to show acute fractures of bilateral pubic rami. CT pelvis done showing acute fracture of superior and inferior pubic rami and right sacral ala. Hematoma in right lower pelvis. CXR reviewed; appears clear.     #Acute CVA with RUE Weakness,  dysarthria and expressive aphasia,  - Code stroke called in ED due to facial droop and right UE weakness.   - CTA HEAD/NECK: No occlusion or flow-limiting stenosis. 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 Mild calcified plaque at the bilateral carotid bulbs, producing up to 40% stenosis of the proximal left ICA by NASCET criteria. No hemodynamically flow-limiting stenosis at the origin of the right ICA.  - MR Head No Cont: Acute infarct involving the left posterior putamen and frontal corona radiata. No acute intracranial hemorrhage.  - Per Neuro Stroke mechanism may be cardioembolic secondary to atrial fibrillation. PFO likely incidental and no current evidence to close PFO.  - Pt was on chronic AC w Xarelto, however it appears to have been  a therapeutic failure, changed to Eliquis 5 mg bid.   - TTE with PFO, per cardiology no intervention   - Likely cause of fall given reported hx and no cardiac prodrome sx  - Continue atorvastatin  - Prior echo with nml lv fxn  - Echo with PFO, anteroseptal hypokinesis  - LE duplex negative for DVT  - CVA with hypophonia since admission ENT consulted - indirect laryngoscopy shows posterior arytenoid edema likely 2/2 GERD, airway patent, VC mobile  - MBS done, on dysphagia diet - moderately thickened liquids    # Brain Aneurysm  Follow up with Neurosurgery as outpatient for surveillance of aneurysm  MR Head No Cont: Acute infarct involving the left posterior putamen and frontal corona radiata. No acute intracranial hemorrhage.     # Hematoma  Pelvic CT with Hematoma in the right lower pelvis with slight mass effect upon the right lateral wall of the bladder.  H/H stable on Eliquis     # During hospital stay pt was found to be hypotensive - Losartan held - reassess need to resume Losartan     # Leukocytosis - On admission WBC was 15.5 which initially downtrended, now uptrended - f/u UA, CXR    PMR consult recommends Acute Rehab      89 y F, hx of afib on Xarelto, CAD, htn, R ankle fx s/p repaired 9 months ago, coming from home, lives by herself, presenting with 1-day onset of R hip pain after a mechanical fall. Patient was found to have b/l pubic rami fractures, but was cleared for discharge per ortho. On first night of admission, pt was a code stroke. MRI done revealed acute infarct in the L posterior putamen and frontal corona radiata. Pt was given a few days of permissive HTN and was planning for acute rehab. There were concerns for possible dysphagia from family so speech and swallow was consulted and MBS was done - pt cleared for minced and moist diet. On 3/6 dc was planned but pt had episode of hypotension so dc was held. The next day, pt had leukocytosis so dc was held to complete infectious w/u. At this time, there were concerns for ASA PNA - so speech was called back. Speech recommended repeat MBS. Simultaneously, ID was consulted and requested CT chest non-con and CT abdomen and pelvis w/ IV contrast. MBS was done first (pt recommended for pureed diet), which delayed CT w/ IV contrast - as pt needed to clear MBS contrast prior to getting more contrast w/ CT, which took a few days. Ultimately family requested CT abdomen be done as an outpatient as leukocytosis had resolved. At this point, there were concerns about poor PO intake and nutrition was consulted - who recommended some supplements which were ordered. Again, pt was nearly ready for discharge and began to display episodes of wide complex tachy cardia - most notably 30 beats (approx 35 seconds) - cardio was recalled and BB dose was increased. Episodes of WCT improved and cardiology cleared this patient for discharge to acute rehab.           Dx:  B/l pubic rami fractures s/p fall; no ortho or neuro intervention, pain control          Facial droop s/p Code Stroke x2; CTH negative, MRI w/ acute infarct; neuro following, EP consult for ILR no concern, echo with PFO          Afib s/p xarelto, now on Eliquis (?failed Xarelto) BB          Brain Aneurysm - Outpt NSx f/u          PFO on Echo - no intervention per cards          Hematoma - Pelvic CT with Hematoma in the right lower pelvis with slight mass effect upon the right lateral wall of the bladder.

## 2023-03-07 NOTE — DISCHARGE NOTE PROVIDER - NSDCCPCAREPLAN_GEN_ALL_CORE_FT
PRINCIPAL DISCHARGE DIAGNOSIS  Diagnosis: Bilateral pubic rami fractures  Assessment and Plan of Treatment:       SECONDARY DISCHARGE DIAGNOSES  Diagnosis: Acute cerebrovascular accident (CVA)  Assessment and Plan of Treatment:      PRINCIPAL DISCHARGE DIAGNOSIS  Diagnosis: Bilateral pubic rami fractures  Assessment and Plan of Treatment: Presented with hip pain after a fall  WBC of 15K on admission.   Xray noted to show acute fractures of bilateral pubic rami.   CT pelvis done showing acute fracture of superior and inferior pubic rami and right sacral ala. Hematoma in right lower pelvis.   -WBAT b/l LEs  -pain control  -no acute ortho surgery at this time  -follow up outpt with Dr. Parra in 1 week, call office for appt  PM&R rec Acute Rehab      SECONDARY DISCHARGE DIAGNOSES  Diagnosis: Acute cerebrovascular accident (CVA)  Assessment and Plan of Treatment: Code stroke called in ED due to facial droop and right UE weakness.   CT head, CTA brain and neck done with no acute abnormalities.   Acute  CVA, confirmed w MR 2/25, RUE hemiparesis, dysarthria and expressive aphasia  TTE w PFO, cardiology following, no intervention  Pt was on chronic AC w Xarelto, however it appears to have been  a therapeutic failure, changed to Eliquis 5 mg bid.   MBS done, on dysphagia diet  PM&R rec Acute rehab    Diagnosis: Leukocytosis  Assessment and Plan of Treatment: CXR reviewed; appears clear.   No s/s of infection  Resolved    Diagnosis: Hypertension  Assessment and Plan of Treatment: Losartan 25mg daily held for Hypotension    Diagnosis: Chronic atrial fibrillation  Assessment and Plan of Treatment: Pt was on chronic AC w Xarelto, however it appears to have been  a therapeutic failure, changed to Eliquis 5 mg bid.    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 Mild calcified plaque at the bilateral carotid bulbs, producing up to 40% stenosis of the proximal left ICA by NASCET criteria. No hemodynamically flow-limiting stenosis at the origin of the right ICA.  MR Head No Cont:  Acute infarct involving the left posterior putamen and frontal corona radiata. No acute intracranial hemorrhage.  Follow up with Neurosurgery as outpatient for surveillance of aneurysm     PRINCIPAL DISCHARGE DIAGNOSIS  Diagnosis: Bilateral pubic rami fractures  Assessment and Plan of Treatment: Presented with hip pain after a fall  WBC of 15K on admission.   Xray noted to show acute fractures of bilateral pubic rami.   CT pelvis done showing acute fracture of superior and inferior pubic rami and right sacral ala. Hematoma in right lower pelvis.   -WBAT to lower exremities  -pain control  -no acute ortho surgery at this time  -follow up outpt with Dr. Parra in 1 week, call office for appt  PM&R rec Acute Rehab      SECONDARY DISCHARGE DIAGNOSES  Diagnosis: Acute cerebrovascular accident (CVA)  Assessment and Plan of Treatment: Code stroke called in ED due to facial droop and right sided weakness.   CT head, CTA brain and neck done with no acute abnormalities.   Acute  CVA, confirmed on MR Head 2/25, RUE hemiparesis, dysarthria and expressive aphasia  TTE w PFO, cardiology following, no intervention  Pt was on chronic AC w Xarelto, however it appears to have been  a therapeutic failure, changed to Eliquis 5 mg bid.   - Echo with PFO, anteroseptal hypokinesis  - LE duplex negative for DVT  - CVA with hypophonia since admission ENT consulted - indirect laryngoscopy shows posterior arytenoid edema likely 2/2 GERD, airway patent, VC mobile  - MBS done, on dysphagia diet - moderately thickened liquids  - Atorvastatin increased to 80mg at bedtime  PM&R rec Acute rehab    Diagnosis: Leukocytosis  Assessment and Plan of Treatment: CXR reviewed; appears clear.   No s/s of infection  Resolved    Diagnosis: Hypertension  Assessment and Plan of Treatment: Losartan 25mg daily held for Hypotension    Diagnosis: Chronic atrial fibrillation  Assessment and Plan of Treatment: Pt was on chronic AC w Xarelto, however it appears to have been  a therapeutic failure, changed to Eliquis 5 mg bid.  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 Mild calcified plaque at the bilateral carotid bulbs, producing up to 40% stenosis of the proximal left ICA by NASCET criteria. No hemodynamically flow-limiting stenosis at the origin of the right ICA.  MR Head No Cont:  Acute infarct involving the left posterior putamen and frontal corona radiata. No acute intracranial hemorrhage.  Follow up with Neurosurgery as outpatient for surveillance of aneurysm    Diagnosis: PFO (patent foramen ovale)  Assessment and Plan of Treatment: - Echo with PFO with  anteroseptal hypokinesis - no plan for intervention as inpatient  - Follow up with cardiologist in 1 week     PRINCIPAL DISCHARGE DIAGNOSIS  Diagnosis: Bilateral pubic rami fractures  Assessment and Plan of Treatment: Presented with hip pain after a fall  WBC of 15K on admission.   Xray noted to show acute fractures of bilateral pubic rami.   CT pelvis done showing acute fracture of superior and inferior pubic rami and right sacral ala. Hematoma in right lower pelvis.   -WBAT to lower exremities  -pain control  -no acute ortho surgery at this time  -follow up outpt with Dr. Parra in 1 week, call office for appt  PM&R rec Acute Rehab      SECONDARY DISCHARGE DIAGNOSES  Diagnosis: Acute cerebrovascular accident (CVA)  Assessment and Plan of Treatment: Code stroke called in ED due to facial droop and right sided weakness.   CT head, CTA brain and neck done with no acute abnormalities.   Acute  CVA, confirmed on MR Head 2/25, RUE hemiparesis, dysarthria and expressive aphasia  TTE w PFO, cardiology following, no intervention  Pt was on chronic AC w Xarelto, however it appears to have been  a therapeutic failure, changed to Eliquis 5 mg bid.   - Echo with PFO, anteroseptal hypokinesis  - LE duplex negative for DVT  - CVA with hypophonia since admission ENT consulted - indirect laryngoscopy shows posterior arytenoid edema likely 2/2 GERD, airway patent, VC mobile  - MBS done, on dysphagia diet - moderately thickened liquids  - Atorvastatin increased to 80mg at bedtime  PM&R rec Acute rehab    Diagnosis: Leukocytosis  Assessment and Plan of Treatment: CXR reviewed; appears clear.   No s/s of infection  Resolved    Diagnosis: Hypertension  Assessment and Plan of Treatment: Losartan 25mg daily held for Hypotension    Diagnosis: Chronic atrial fibrillation  Assessment and Plan of Treatment: Pt was on chronic AC w Xarelto, however it appears to have been  a therapeutic failure, changed to Eliquis 5 mg bid.  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 Mild calcified plaque at the bilateral carotid bulbs, producing up to 40% stenosis of the proximal left ICA by NASCET criteria. No hemodynamically flow-limiting stenosis at the origin of the right ICA.  MR Head No Cont:  Acute infarct involving the left posterior putamen and frontal corona radiata. No acute intracranial hemorrhage.  Follow up with Neurosurgery as outpatient for surveillance of aneurysm    Diagnosis: PFO (patent foramen ovale)  Assessment and Plan of Treatment: - Echo with PFO with  anteroseptal hypokinesis - no plan for intervention as inpatient  - Follow up with cardiologist in 1 week    Diagnosis: Hematoma  Assessment and Plan of Treatment: Pelvic CT with Hematoma in the right lower pelvis with slight mass effect upon the right lateral wall of the bladder.  H/H stable on Eliquis

## 2023-03-07 NOTE — PROGRESS NOTE ADULT - SUBJECTIVE AND OBJECTIVE BOX
Patient is a 89y old  Female who presents with a chief complaint of fall; pubic rami fracture (05 Mar 2023 11:11)      SUBJECTIVE / OVERNIGHT EVENTS: ptn has no new c/o, awaiting DC to [facility] , AC changed to [facility]    T(C): 36.8 (03-07-23 @ 20:30), Max: 36.8 (03-07-23 @ 20:30)  HR: 78 (03-07-23 @ 20:30) (78 - 78)  BP: 105/66 (03-07-23 @ 20:30) (105/66 - 105/66)  RR: 18 (03-07-23 @ 20:30) (18 - 18)  SpO2: 93% (03-07-23 @ 20:30) (93% - 93%)      MEDICATIONS  (STANDING):  acetaminophen     Tablet .. 975 milliGRAM(s) Oral every 6 hours  apixaban 5 milliGRAM(s) Oral two times a day  bisacodyl Suppository 10 milliGRAM(s) Rectal once  chlorhexidine 2% Cloths 1 Application(s) Topical daily  lactulose Syrup 10 Gram(s) Oral once  metoprolol succinate ER 25 milliGRAM(s) Oral daily  polyethylene glycol 3350 17 Gram(s) Oral daily  rosuvastatin 40 milliGRAM(s) Oral at bedtime  saline laxative (FLEET) Rectal Enema 1 Enema Rectal once    MEDICATIONS  (PRN):  oxyCODONE    IR 2.5 milliGRAM(s) Oral every 4 hours PRN Moderate Pain (4 - 6)        PHYSICAL EXAM:  GENERAL: NAD, well-developed  HEAD:  Atraumatic, Normocephalic  EYES: EOMI, PERRLA, conjunctiva and sclera clear  NECK: Supple, No JVD  CHEST/LUNG: Clear to auscultation bilaterally; No wheeze  HEART: Regular rate and rhythm; No murmurs, rubs, or gallops  ABDOMEN: Soft, Nontender, Nondistended; Bowel sounds present  EXTREMITIES:  2+ Peripheral Pulses, No clubbing, cyanosis, or edema  PSYCH: AAOx3  NEUROLOGY: non-focal  SKIN: No rashes or lesions                            10.5   13.31 )-----------( 239      ( 07 Mar 2023 07:04 )             32.7               137|104|43<104  4.5|23|0.87  8.9,--,--  03-07 @ 07:04          RADIOLOGY & ADDITIONAL TESTS:    Imaging Personally Reviewed:    Consultant(s) Notes Reviewed:      Care Discussed with Consultants/Other Providers:

## 2023-03-07 NOTE — DISCHARGE NOTE PROVIDER - CARE PROVIDERS DIRECT ADDRESSES
,[email or web address],DirectAddress_Unknown ,[email or web address],DirectAddress_Unknown,DirectAddress_Unknown

## 2023-03-08 NOTE — PROGRESS NOTE ADULT - SUBJECTIVE AND OBJECTIVE BOX
CARDIOLOGY FOLLOW UP - Dr. Booker  DATE OF SERVICE: 3/8/23    CC  No CV events    REVIEW OF SYSTEMS:  CONSTITUTIONAL: No fever, weight loss, or fatigue  RESPIRATORY: No cough, wheezing, chills or hemoptysis; No Shortness of Breath  CARDIOVASCULAR: No chest pain, palpitations, passing out, dizziness, or leg swelling  GASTROINTESTINAL: No abdominal or epigastric pain. No nausea, vomiting, or hematemesis; No diarrhea or constipation. No melena or hematochezia.  VASCULAR: No edema     PHYSICAL EXAM:  T(C): 36.8 (03-08-23 @ 11:26), Max: 36.8 (03-07-23 @ 20:30)  HR: 83 (03-08-23 @ 11:26) (78 - 92)  BP: 102/66 (03-08-23 @ 11:26) (102/66 - 119/68)  RR: 18 (03-08-23 @ 11:26) (18 - 18)  SpO2: 95% (03-08-23 @ 11:26) (93% - 95%)  Wt(kg): --  I&O's Summary    07 Mar 2023 07:01  -  08 Mar 2023 07:00  --------------------------------------------------------  IN: 600 mL / OUT: 400 mL / NET: 200 mL        Appearance: Elderly femle	  Cardiovascular: Normal S1 S2,RRR, No JVD, No murmurs  Respiratory: Lungs clear to auscultation	  Gastrointestinal:  Soft, Non-tender, + BS	  Extremities: Normal range of motion, No clubbing, cyanosis or edema      Home Medications:  acetaminophen 325 mg oral tablet: 3 tab(s) orally every 6 hours (07 Mar 2023 11:48)  apixaban 5 mg oral tablet: 1 tab(s) orally 2 times a day (07 Mar 2023 11:48)  metoprolol succinate 25 mg oral tablet, extended release: 1 tab(s) orally once a day (at bedtime) (24 Feb 2023 17:17)  Narcan 4 mg/0.1 mL nasal spray: nasal once a day, As Needed -3 minutes if no or minimal response.  . Naloxone spray 4 mg/0.1 ml intranasal, Spray 0.1 mL into one nostril. Repeat with second device into other nostril after 2-3 minutes if no or minimal response     (07 Mar 2023 11:56)  oxyCODONE 5 mg oral tablet: 0.5 tab(s) orally every 6 hours, As Needed (07 Mar 2023 11:56)  polyethylene glycol 3350 oral powder for reconstitution: 17 gram(s) orally once a day (07 Mar 2023 11:48)  rosuvastatin 40 mg oral tablet: 1 tab(s) orally once a day (at bedtime) (07 Mar 2023 11:48)      MEDICATIONS  (STANDING):  acetaminophen     Tablet .. 975 milliGRAM(s) Oral every 6 hours  apixaban 5 milliGRAM(s) Oral two times a day  chlorhexidine 2% Cloths 1 Application(s) Topical daily  metoprolol succinate ER 25 milliGRAM(s) Oral daily  polyethylene glycol 3350 17 Gram(s) Oral daily  rosuvastatin 40 milliGRAM(s) Oral at bedtime  senna 2 Tablet(s) Oral at bedtime      TELEMETRY: SR 90s	    ECG:  	  RADIOLOGY:   DIAGNOSTIC TESTING:  [ ] Echocardiogram:  [ ]  Catheterization:  [ ] Stress Test:    OTHER: 	    LABS:	 	                            11.4   19.29 )-----------( 277      ( 08 Mar 2023 06:32 )             34.8     03-07    137  |  104  |  43<H>  ----------------------------<  104<H>  4.5   |  23  |  0.87    Ca    8.9      07 Mar 2023 07:04

## 2023-03-08 NOTE — CONSULT NOTE ADULT - ATTENDING COMMENTS
89 year old female from home AAO x3, with PMH of Afib on xarelto, CAD, HTN, HLD and history of right ankle fracture s/p repair 9 months ago, who presented to the ED s/p fall at home on 2/24.  Xray noted to show acute fractures of bilateral pubic rami. CT pelvis done showing acute fracture of superior and inferior pubic rami and right sacral ala. Hematoma in right lower pelvis. . Code stroke called in ED due to facial droop and right UE weakness. 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 TTE w PFO, Pt planned for d/c to acute rehab 3/7, however cancelled due to elevated WBC (13.31 which has uptrended to 19k today. ID called for the same.     WORKUP  UA: Negative  3/7 CXR IMPRESSION: Low lung volumes. New bibasilar opacities which could be due to atelectasis, small pleural effusions with associated passive atelectasis, and/or pneumonia.  Venous Duplex (3/2): No evidence of deep venous thrombosis in either lower extremity.  MR head (2/25): Acute infarct involving the left posterior putamen and frontal corona radiata. No acute intracranial hemorrhage.  CT Pelvis:  Acute fractures of the superior and inferior pubic rami, bilaterally and of the right sacral ala. Hematoma in the right lower pelvis with slight mass effect upon the right  lateral wall of the bladder.       DIAGNOSIS and IMPRESSION  Leukocytosis to 19k, tachycardia, SIRS   Bilateral pubic rami fractures with Pelvic Hematoma  Abnormal CXR with possible pneumonia '        RECOMMENDATIONS  Recommend Sputum cx if able,   MRSA PCR  Blood cx x 2  U/A negative   no diarrhea.   CT chest pending   Recommend CT pelvis with IV contrast as well to re-visualise recent hematoma as possible source  trend cbc for leucocytosis   plan for MBS tomorrow.       Plan discussed with Medicine Attending.       Lazara Chirinos  Please contact through MS Teams   If no response or past 5 pm/weekend call [phone].   Trend WBC and monitor for fevers OFF abx

## 2023-03-08 NOTE — PROGRESS NOTE ADULT - ASSESSMENT
Echo 4/6/21: Nml LV fxn EF 61%  Echo 2/27/23: Mild anteroseptal hypokinesis EF 45-50%, Mild diastolic dysfxn, Patent foramen ovale    A/P  89 year old female from home AAO x3, with PMH of Afib on xarelto, CAD, HTN, HLD and history of right ankle fracture s/p repair 9 months ago, who presented to the ED s/p fall at home, found to have b/l pubic rami fx with hematoma and acute infarct on MRI.    #Acute CVA  -Likely cause of fall given reported hx and no cardiac prodrome sx  -Neuro following  -Continue atorvastatin  -Prior echo with nml lv fxn  -Echo with PFO, anteroseptal hypokinesis  -LE duplex negative for DVT  -F/u neurology  -Likely xarelto failure  -Continue apixaban    #Afib  -NSR on tele  -Continue metoprolol 25mg qd  -Continue apixaban  -Echo results as above    #CAD  -Continue atorvastatin    #HTN  -Continue metoprolol  -Continue losartan  -Hold lisinopril for hypotension  -If bp remains low, may also hold losartan     #Pubic rami fx w/ hematoma  -Per ortho no surgical intervention  -Monitor h/h while on apixaban

## 2023-03-08 NOTE — CHART NOTE - NSCHARTNOTEFT_GEN_A_CORE
Pt is an 89 year old female from home AAO x3, with PMH of Afib on xarelto, CAD, HTN, HLD and history of right ankle fracture s/p repair 9 months ago, who presented to the ED s/p fall at home. WBC of 15K. Xray noted to show acute fractures of bilateral pubic rami. CT pelvis done showing acute fracture of superior and inferior pubic rami and right sacral ala. Hematoma in right lower pelvis. CXR reviewed; appears clear. Code stroke called in ED due to facial droop and right UE weakness. CT head, CTA brain and neck done with no acute abnormalities. RRT 2/25 for new onset of right sided weakness. Code stroke called at 11:52pm. NIHSS 2 upon stroke team arrival (RUE drift, R facial droop). Not a Teneceteplase candidate due to abnormal coag. Not a thrombectomy candidate due to lack of LVO. Neurology consult: CT head: neg. CTA/P: neg. Impression:  acute R arm drift and R facial droop. NIHSS 2. Repeat CTH/A/P unremarkable. DDx ischemic stroke, TIA, toxic/metabolic/infectious.    2/25 MRI HEAD IMPRESSION: Acute infarct involving the left posterior putamen and frontal corona radiata. No acute intracranial hemorrhage. Report also sent to the referring providers via [employer] teams message.    SWALLOW HISTORY: No reports in SCM or in PACS prior to this admission. Seen for bedside swallow evaluation and speech language evaluation. See report for details.  MBS completed 3/2 with recommendations for minced-moist/moderately thick liquids. The oral phase of swallow is characterized by effortful mastication and moderate oral residue w/ soft/bite-sized solids. The pharyngeal phase of swallow is notable for deep penetration of mildly thick liquids during/after the swallow and silent aspiration of thin liquids. Strategies which improved airway protection w/ mildly thick liquids include chin tuck posture and swallow x2.  3/3: Pt seen for dysphagia tx and tolerating current diet.     Pt planned for d/c to acute rehab 3/7, however cancelled due to elevated WBC (13.31).   3/7 CXR IMPRESSION: Low lung volumes. New bibasilar opacities which could be due to atelectasis, small pleural effusions with associated passive atelectasis, and/or pneumonia.  Today, WBC 19.29. D/W NP Rachel, team c/f aspiration PNA at this time. Encountered patient at the bedside w/ daughter present. Pt w/ limited PO intake due to dislike of current diet, however tolerating with no overt s/s aspiration. Discussed with pt/daughter consideration for repeat MBS to rule out any changes in swallow function that may be contributing to c/f PNA. Pt/daughter in agreement to proceed with repeat MBS 3/9 with understanding of possible different outcomes from initial objective testing, which may warrant additional discussion for GOC to clarify further SLP role.     AMBROSE Ramos and DEMI Greene made aware.     Continue current diet of minced-moist/moderately thick liquids in interim of MBS 3/9.  Strict aspiration and reflux precautions!     Flaquita Horton CCC-SLP   Prefer teams or [phone]

## 2023-03-08 NOTE — PROGRESS NOTE ADULT - SUBJECTIVE AND OBJECTIVE BOX
Patient is a 89y old  Female who presents with a chief complaint of fall; pubic rami fracture (05 Mar 2023 11:11)      SUBJECTIVE / OVERNIGHT EVENTS: ptn has no new c/o, awaiting DC to [facility] , AC changed to [facility]    T(C): 36.6 (03-08-23 @ 21:12), Max: 36.8 (03-08-23 @ 11:26)  HR: 88 (03-08-23 @ 21:12) (83 - 90)  BP: 106/64 (03-08-23 @ 21:12) (92/56 - 106/64)  RR: 18 (03-08-23 @ 21:12) (18 - 18)  SpO2: 96% (03-08-23 @ 21:12) (93% - 96%)      MEDICATIONS  (STANDING):  acetaminophen     Tablet .. 975 milliGRAM(s) Oral every 6 hours  apixaban 5 milliGRAM(s) Oral two times a day  chlorhexidine 2% Cloths 1 Application(s) Topical daily  metoprolol succinate ER 25 milliGRAM(s) Oral daily  polyethylene glycol 3350 17 Gram(s) Oral daily  rosuvastatin 40 milliGRAM(s) Oral at bedtime  senna 2 Tablet(s) Oral at bedtime    MEDICATIONS  (PRN):  oxyCODONE    IR 2.5 milliGRAM(s) Oral every 4 hours PRN Moderate Pain (4 - 6)      PHYSICAL EXAM:  GENERAL: NAD, well-developed  HEAD:  Atraumatic, Normocephalic  EYES: EOMI, PERRLA, conjunctiva and sclera clear  NECK: Supple, No JVD  CHEST/LUNG: Clear to auscultation bilaterally; No wheeze  HEART: Regular rate and rhythm; No murmurs, rubs, or gallops  ABDOMEN: Soft, Nontender, Nondistended; Bowel sounds present  EXTREMITIES:  2+ Peripheral Pulses, No clubbing, cyanosis, or edema  PSYCH: AAOx3  NEUROLOGY: non-focal  SKIN: No rashes or lesions                                  11.4   19.29 )-----------( 277      ( 08 Mar 2023 06:32 )             34.8               CAPILLARY BLOOD GLUCOSE            RADIOLOGY & ADDITIONAL TESTS:    Imaging Personally Reviewed:    Consultant(s) Notes Reviewed:      Care Discussed with Consultants/Other Providers:

## 2023-03-08 NOTE — CONSULT NOTE ADULT - SUBJECTIVE AND OBJECTIVE BOX
Patient is a 89y old  Female who presents with a chief complaint of fall; pubic rami fracture (08 Mar 2023 12:28)    HPI: Ms Longo is a 89 year old female from home AAO x3, with PMH of Afib on xarelto, CAD, HTN, HLD and history of right ankle fracture s/p repair 9 months ago, who presented to the ED s/p fall at home on .  Xray noted to show acute fractures of bilateral pubic rami. CT pelvis done showing acute fracture of superior and inferior pubic rami and right sacral ala. Hematoma in right lower pelvis. . Code stroke called in ED due to facial droop and right UE weakness. 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 TTE w PFO, Pt planned for d/c to acute rehab 3/7, however cancelled due to elevated WBC (13.31 which has uptrended to 19k today. ID called for the same.            REVIEW OF SYSTEMS  [  ] ROS unobtainable because:    [ x ] All other systems negative except as noted below    Constitutional:  [ ] fever [ ] chills  [ ] weight loss  [ ]night sweat  [ ]poor appetite/PO intake [ ]fatigue   Skin:  [ ] rash [ ] phlebitis	  Eyes: [ ] icterus [ ] pain  [ ] discharge	  ENMT: [ ] sore throat  [ ] thrush [ ] ulcers [ ] exudates [ ]anosmia  Respiratory: [ ] dyspnea [ ] hemoptysis [ ] cough [ ] sputum	  Cardiovascular:  [ ] chest pain [ ] palpitations [ ] edema	  Gastrointestinal:  [ ] nausea [ ] vomiting [ ] diarrhea [ ] constipation [ ] pain	  Genitourinary:  [ ] dysuria [ ] frequency [ ] hematuria [ ] discharge [ ] flank pain  [ ] incontinence  Musculoskeletal:  [ ] myalgias [ ] arthralgias [ ] arthritis  [ ] back pain  Neurological:  [ ] headache [ ] weakness [ ] seizures  [ ] confusion/altered mental status    prior hospital charts reviewed [V]  primary team notes reviewed [V]  other consultant notes reviewed [V]    PAST MEDICAL & SURGICAL HISTORY:  HTN (hypertension)      Hyperlipidemia      CAD (coronary artery disease)  Non-obstructive- Had an angiogram years ago      MVP (mitral valve prolapse)      HTN (hypertension)      Chronic atrial fibrillation      History of ankle surgery          SOCIAL HISTORY:  - Denied smoking/vaping/alcohol/recreational drug use    FAMILY HISTORY:  Family history of CVA (Mother)        Allergies  No Known Allergies        ANTIMICROBIALS:      ANTIMICROBIALS (past 90 days):  MEDICATIONS  (STANDING):        OTHER MEDS:   MEDICATIONS  (STANDING):  acetaminophen     Tablet .. 975 every 6 hours  apixaban 5 two times a day  metoprolol succinate ER 25 daily  oxyCODONE    IR 2.5 every 4 hours PRN  polyethylene glycol 3350 17 daily  rosuvastatin 40 at bedtime  senna 2 at bedtime      VITALS:  Vital Signs Last 24 Hrs  T(F): 98.2 (23 @ 11:26), Max: 98.6 (23 @ 21:09)    Vital Signs Last 24 Hrs  HR: 90 (23 @ 11:31) (78 - 92)  BP: 101/65 (23 @ 11:31) (101/65 - 119/68)  RR: 18 (23 @ 11:26)  SpO2: 93% (23 @ 11:31) (93% - 95%)  Wt(kg): --    EXAM:    GA: NAD, AOx3  HEENT: oral cavity no lesion  CV: nl S1/S2, no RMG  Lungs: CTAB, No distress  Abd: BS+, soft, nontender, no rebounding pain  Ext: no edema  Neuro: No focal deficits  Skin: Intact  IV: no phlebitis    Labs:                        11.4   19.29 )-----------( 277      ( 08 Mar 2023 06:32 )             34.8     -    137  |  104  |  43<H>  ----------------------------<  104<H>  4.5   |  23  |  0.87    Ca    8.9      07 Mar 2023 07:04        WBC Trend:  WBC Count: 19.29 (23 @ 06:32)  WBC Count: 13.31 (23 @ 07:04)  WBC Count: 11.59 (23 @ 06:08)      Auto Neutrophil #: 13.59 K/uL (23 @ 10:57)  Auto Neutrophil #: 11.49 K/uL (22 @ 19:16)      Creatine Trend:  Creatinine, Serum: 0.87 ()  Creatinine, Serum: 0.83 ()  Creatinine, Serum: 0.88 ()  Creatinine, Serum: 0.90 ()      Liver Biochemical Testing Trend:  Alanine Aminotransferase (ALT/SGPT): 33 ()  Alanine Aminotransferase (ALT/SGPT): 29 ()  Alanine Aminotransferase (ALT/SGPT): 34 ()  Alanine Aminotransferase (ALT/SGPT): 79 *H* ()  Alanine Aminotransferase (ALT/SGPT): 18 (03-10)  Aspartate Aminotransferase (AST/SGOT): 61 (23 @ 07:29)  Aspartate Aminotransferase (AST/SGOT): 50 (23 @ 00:08)  Aspartate Aminotransferase (AST/SGOT): 38 (23 @ 10:57)  Aspartate Aminotransferase (AST/SGOT): 56 (22 @ 19:16)  Aspartate Aminotransferase (AST/SGOT): 26 (03-10-22 @ 02:17)  Bilirubin Total, Serum: 0.6 ()  Bilirubin Total, Serum: 0.7 ()  Bilirubin Total, Serum: 0.5 ()  Bilirubin Total, Serum: 0.4 ()  Bilirubin Total, Serum: 0.2 (03-10)      Trend LDH          Urinalysis Basic - ( 08 Mar 2023 14:13 )    Color: Yellow / Appearance: Clear / S.024 / pH: x  Gluc: x / Ketone: Negative  / Bili: Negative / Urobili: Negative   Blood: x / Protein: Trace / Nitrite: Negative   Leuk Esterase: Negative / RBC: x / WBC x   Sq Epi: x / Non Sq Epi: x / Bacteria: x        MICROBIOLOGY:    MRSA PCR Result.: Aracelis (23 @ 06:02)      SF:                  RADIOLOGY:  imaging below personally reviewed     Patient is a 89y old  Female who presents with a chief complaint of fall; pubic rami fracture (08 Mar 2023 12:28)    HPI: Ms Longo is a 89 year old female from home AAO x3, with PMH of Afib on xarelto, CAD, HTN, HLD and history of right ankle fracture s/p repair 9 months ago, who presented to the ED s/p fall at home on .  Xray noted to show acute fractures of bilateral pubic rami. CT pelvis done showing acute fracture of superior and inferior pubic rami and right sacral ala. Hematoma in right lower pelvis. . Code stroke called in ED due to facial droop and right UE weakness. 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 TTE w PFO, Pt planned for d/c to acute rehab 3/7, however cancelled due to elevated WBC (13.31 which has uptrended to 19k today. ID called for the same. Pt notes persistent pain in pelvis and constipation. No new worsening cough           REVIEW OF SYSTEMS  [  ] ROS unobtainable because:    [ x ] All other systems negative except as noted below    Constitutional:  [ ] fever [ ] chills  [ ] weight loss  [ ]night sweat  [ ]poor appetite/PO intake [ ]fatigue   Skin:  [ ] rash [ ] phlebitis	  Eyes: [ ] icterus [ ] pain  [ ] discharge	  ENMT: [ ] sore throat  [ ] thrush [ ] ulcers [ ] exudates [ ]anosmia  Respiratory: [ ] dyspnea [ ] hemoptysis [ ] cough [ ] sputum	  Cardiovascular:  [ ] chest pain [ ] palpitations [ ] edema	  Gastrointestinal:  [ ] nausea [ ] vomiting [ ] diarrhea [ ] constipation [ ] pain	  Genitourinary:  [ ] dysuria [ ] frequency [ ] hematuria [ ] discharge [ ] flank pain  [ ] incontinence  Musculoskeletal:  [ ] myalgias [ ] arthralgias [ ] arthritis  [ ] back pain  Neurological:  [ ] headache [ ] weakness [ ] seizures  [ ] confusion/altered mental status    prior hospital charts reviewed [V]  primary team notes reviewed [V]  other consultant notes reviewed [V]    PAST MEDICAL & SURGICAL HISTORY:  HTN (hypertension)  Hyperlipidemia  CAD (coronary artery disease)Non-obstructive- Had an angiogram years ago  MVP (mitral valve prolapse)  HTN (hypertension)  Chronic atrial fibrillation  History of ankle surgery    SOCIAL HISTORY:  - Denied smoking/vaping/alcohol/recreational drug use    FAMILY HISTORY:  Family history of CVA (Mother)        Allergies  No Known Allergies        ANTIMICROBIALS:      ANTIMICROBIALS (past 90 days):  MEDICATIONS  (STANDING):        OTHER MEDS:   MEDICATIONS  (STANDING):  acetaminophen     Tablet .. 975 every 6 hours  apixaban 5 two times a day  metoprolol succinate ER 25 daily  oxyCODONE    IR 2.5 every 4 hours PRN  polyethylene glycol 3350 17 daily  rosuvastatin 40 at bedtime  senna 2 at bedtime      VITALS:  Vital Signs Last 24 Hrs  T(F): 98.2 (23 @ 11:26), Max: 98.6 (23 @ 21:09)    Vital Signs Last 24 Hrs  HR: 90 (23 @ 11:31) (78 - 92)  BP: 101/65 (23 @ 11:31) (101/65 - 119/68)  RR: 18 (23 @ 11:26)  SpO2: 93% (23 @ 11:31) (93% - 95%)  Wt(kg): --    EXAM:    GA: NAD, AOx3  HEENT: oral cavity no lesion  CV: nl S1/S2, no RMG  Lungs: Decreases breath sounds in Rt lower base  Abd: BS+, soft, nontender, no rebounding pain  Ext: Pain on movement of hip  Neuro: right sided deficits  Skin: Intact  IV: no phlebitis    Labs:                        11.4   19.29 )-----------( 277      ( 08 Mar 2023 06:32 )             34.8         137  |  104  |  43<H>  ----------------------------<  104<H>  4.5   |  23  |  0.87    Ca    8.9      07 Mar 2023 07:04        WBC Trend:  WBC Count: 19.29 (23 @ 06:32)  WBC Count: 13.31 (23 @ 07:04)  WBC Count: 11.59 (23 @ 06:08)      Auto Neutrophil #: 13.59 K/uL (23 @ 10:57)  Auto Neutrophil #: 11.49 K/uL (22 @ 19:16)      Creatine Trend:  Creatinine, Serum: 0.87 ()  Creatinine, Serum: 0.83 ()  Creatinine, Serum: 0.88 ()  Creatinine, Serum: 0.90 ()      Liver Biochemical Testing Trend:  Alanine Aminotransferase (ALT/SGPT): 33 ()  Alanine Aminotransferase (ALT/SGPT): 29 ()  Alanine Aminotransferase (ALT/SGPT): 34 ()  Alanine Aminotransferase (ALT/SGPT): 79 *H* ()  Alanine Aminotransferase (ALT/SGPT): 18 (03-10)  Aspartate Aminotransferase (AST/SGOT): 61 (23 @ 07:29)  Aspartate Aminotransferase (AST/SGOT): 50 (23 @ 00:08)  Aspartate Aminotransferase (AST/SGOT): 38 (23 @ 10:57)  Aspartate Aminotransferase (AST/SGOT): 56 (22 @ 19:16)  Aspartate Aminotransferase (AST/SGOT): 26 (03-10-22 @ 02:17)  Bilirubin Total, Serum: 0.6 ()  Bilirubin Total, Serum: 0.7 ()  Bilirubin Total, Serum: 0.5 ()  Bilirubin Total, Serum: 0.4 ()  Bilirubin Total, Serum: 0.2 (03-10)      Trend LDH          Urinalysis Basic - ( 08 Mar 2023 14:13 )    Color: Yellow / Appearance: Clear / S.024 / pH: x  Gluc: x / Ketone: Negative  / Bili: Negative / Urobili: Negative   Blood: x / Protein: Trace / Nitrite: Negative   Leuk Esterase: Negative / RBC: x / WBC x   Sq Epi: x / Non Sq Epi: x / Bacteria: x        MICROBIOLOGY:    MRSA PCR Result.: Aracelis (23 @ 06:02)      SF:                  RADIOLOGY:  imaging below personally reviewed     Patient is a 89y old  Female who presents with a chief complaint of fall; pubic rami fracture (08 Mar 2023 12:28)    HPI: Ms Longo is a 89 year old female from home AAO x3, with PMH of Afib on xarelto, CAD, HTN, HLD and history of right ankle fracture s/p repair 9 months ago, who presented to the ED s/p fall at home on .  Xray noted to show acute fractures of bilateral pubic rami. CT pelvis done showing acute fracture of superior and inferior pubic rami and right sacral ala. Hematoma in right lower pelvis. . Code stroke called in ED due to facial droop and right UE weakness. 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 TTE w PFO, Pt planned for d/c to acute rehab 3/7, however cancelled due to elevated WBC (13.31 which has uptrended to 19k today. ID called for the same. Pt notes persistent pain in pelvis and constipation. No new worsening cough         REVIEW OF SYSTEMS  [  ] ROS unobtainable because:    [ x ] All other systems negative except as noted below    Constitutional:  [ ] fever [ ] chills   Skin:  [ ] rash [ ] phlebitis	  Eyes: [ ] icterus [ ] pain  	  ENMT: [ ] sore throat  [ ] thrush  Respiratory: [ ] dyspnea [ ] cough [ ] sputum	  Cardiovascular:  [ ] chest pain 	  Gastrointestinal:  [ ] nausea [ ] vomiting [ ] diarrhea [ ] constipation [ ] pain	  Genitourinary:  [ ] dysuria [ ] frequency   Musculoskeletal:  [ ] myalgias  [ ] back pain  Neurological:  [ ] headache  [ ] confusion/altered mental status  Extremities: No swelling      prior hospital charts reviewed [V]  primary team notes reviewed [V]  other consultant notes reviewed [V]      PAST MEDICAL & SURGICAL HISTORY:  HTN (hypertension)  Hyperlipidemia  CAD (coronary artery disease)Non-obstructive- Had an angiogram years ago  MVP (mitral valve prolapse)  HTN (hypertension)  Chronic atrial fibrillation  History of ankle surgery      SOCIAL HISTORY:  - Denied smoking, lives with family.       FAMILY HISTORY:  Family history of CVA (Mother)        Allergies  No Known Allergies        ANTIMICROBIALS:      ANTIMICROBIALS (past 90 days):  MEDICATIONS  (STANDING):        OTHER MEDS:   MEDICATIONS  (STANDING):  acetaminophen     Tablet .. 975 every 6 hours  apixaban 5 two times a day  metoprolol succinate ER 25 daily  oxyCODONE    IR 2.5 every 4 hours PRN  polyethylene glycol 3350 17 daily  rosuvastatin 40 at bedtime  senna 2 at bedtime      VITALS:  Vital Signs Last 24 Hrs  T(F): 98.2 (23 @ 11:26), Max: 98.6 (23 @ 21:09)    Vital Signs Last 24 Hrs  HR: 90 (23 @ 11:31) (78 - 92)  BP: 101/65 (23 @ 11:31) (101/65 - 119/68)  RR: 18 (23 @ 11:26)  SpO2: 93% (23 @ 11:31) (93% - 95%)  Wt(kg): --      EXAM:    GA: NAD, AOx3  Eyes: no discharge   ENT: oral cavity no lesion  CV: nl S1/S2,   Lungs: Decreases breath sounds in Rt lower base  Abd: BS+, soft, nontender,   Ext: Pain on movement of hip  : No monroy   Neuro: right sided deficits  MSK: No joint swelling   Skin: Intact  IV: no phlebitis      Labs:                        11.4   19.29 )-----------( 277      ( 08 Mar 2023 06:32 )             34.8         137  |  104  |  43<H>  ----------------------------<  104<H>  4.5   |  23  |  0.87    Ca    8.9      07 Mar 2023 07:04        WBC Trend:  WBC Count: 19.29 (23 @ 06:32)  WBC Count: 13.31 (23 @ 07:04)  WBC Count: 11.59 (23 @ 06:08)      Auto Neutrophil #: 13.59 K/uL (23 @ 10:57)  Auto Neutrophil #: 11.49 K/uL (22 @ 19:16)      Creatine Trend:  Creatinine, Serum: 0.87 ()  Creatinine, Serum: 0.83 ()  Creatinine, Serum: 0.88 ()  Creatinine, Serum: 0.90 ()      Liver Biochemical Testing Trend:  Alanine Aminotransferase (ALT/SGPT): 33 ()  Alanine Aminotransferase (ALT/SGPT): 29 ()  Alanine Aminotransferase (ALT/SGPT): 34 ()  Alanine Aminotransferase (ALT/SGPT): 79 *H* ()  Alanine Aminotransferase (ALT/SGPT): 18 (-10)  Aspartate Aminotransferase (AST/SGOT): 61 (23 @ 07:29)  Aspartate Aminotransferase (AST/SGOT): 50 (23 @ 00:08)  Aspartate Aminotransferase (AST/SGOT): 38 (23 @ 10:57)  Aspartate Aminotransferase (AST/SGOT): 56 (22 @ 19:16)  Aspartate Aminotransferase (AST/SGOT): 26 (03-10-22 @ 02:17)  Bilirubin Total, Serum: 0.6 ()  Bilirubin Total, Serum: 0.7 ()  Bilirubin Total, Serum: 0.5 ()  Bilirubin Total, Serum: 0.4 ()  Bilirubin Total, Serum: 0.2 (03-10)      Trend LDH    Urinalysis Basic - ( 08 Mar 2023 14:13 )    Color: Yellow / Appearance: Clear / S.024 / pH: x  Gluc: x / Ketone: Negative  / Bili: Negative / Urobili: Negative   Blood: x / Protein: Trace / Nitrite: Negative   Leuk Esterase: Negative / RBC: x / WBC x   Sq Epi: x / Non Sq Epi: x / Bacteria: x        MICROBIOLOGY:    MRSA PCR Result.: NotDetec (23 @ 06:02)      Urinalysis (23 @ 14:13)   pH Urine: 7.0   Glucose Qualitative, Urine: Negative   Blood, Urine: Negative   Color: Yellow   Urine Appearance: Clear   Bilirubin: Negative   Ketone - Urine: Negative   Specific Gravity: 1.024   Protein, Urine: Trace   Urobilinogen: Negative   Nitrite: Negative   Leukocyte Esterase Concentration: Negative       RADIOLOGY:  imaging below personally reviewed    < from: Xray Chest 1 View- PORTABLE-Routine (Xray Chest 1 View- PORTABLE-Routine .) (23 @ 15:10) >    IMPRESSION:  Low lung volumes.    New bibasilar opacities which could be due to atelectasis, small pleural   effusions         < from: VA Duplex Lower Ext Vein Scan, Bilat (23 @ 21:37) >  IMPRESSION:  No evidence of deep venous thrombosis in either lower extremity.

## 2023-03-08 NOTE — CONSULT NOTE ADULT - ASSESSMENT
WORKUP  UA: Negative  3/7 CXR IMPRESSION: Low lung volumes. New bibasilar opacities which could be due to atelectasis, small pleural effusions with associated passive atelectasis, and/or pneumonia.    DIAGNOSIS and IMPRESSION  Leukocytosis to 19k        RECOMMENDATIONS        PT TO BE SEEN. PRELIM NOTE  PENDING RECS. PLEASE WAIT FOR FINAL RECS AFTER DISCUSSION WITH ATTENDINGLonnie Bach MD, PGY5  ID fellow  [employer] Teams Preferred  After 5pm/weekends call [phone]    Ms Longo is a 89 year old female from home AAO x3, with PMH of Afib on xarelto, CAD, HTN, HLD and history of right ankle fracture s/p repair 9 months ago, who presented to the ED s/p fall at home on 2/24.  Xray noted to show acute fractures of bilateral pubic rami. CT pelvis done showing acute fracture of superior and inferior pubic rami and right sacral ala. Hematoma in right lower pelvis. . Code stroke called in ED due to facial droop and right UE weakness. 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 TTE w PFO, Pt planned for d/c to acute rehab 3/7, however cancelled due to elevated WBC (13.31 which has uptrended to 19k today. ID called for the same.     WORKUP  UA: Negative  3/7 CXR IMPRESSION: Low lung volumes. New bibasilar opacities which could be due to atelectasis, small pleural effusions with associated passive atelectasis, and/or pneumonia.  Venous Duplex (3/2): No evidence of deep venous thrombosis in either lower extremity.  MR head (2/25): Acute infarct involving the left posterior putamen and frontal corona radiata. No acute intracranial hemorrhage.  CT Pelvis:  Acute fractures of the superior and inferior pubic rami, bilaterally and of the right sacral ala. Hematoma in the right lower pelvis with slight mass effect upon the right  lateral wall of the bladder.     DIAGNOSIS and IMPRESSION  ·	Leukocytosis to 19k  ·	Bilateral pubic rami fractures with Pelvic Hematoma  ·	Acute CVA with dysphagia    Leukocytosis to 19k w/o fevers    RECOMMENDATIONS  CT chest      PT TO BE SEEN. PRELIM NOTE  PENDING RECS. PLEASE WAIT FOR FINAL RECS AFTER DISCUSSION WITH ATTENDINGLonnie Bach MD, PGY5  ID fellow  [employer] Teams Preferred  After 5pm/weekends call [phone]    Ms Longo is a 89 year old female from home AAO x3, with PMH of Afib on xarelto, CAD, HTN, HLD and history of right ankle fracture s/p repair 9 months ago, who presented to the ED s/p fall at home on 2/24.  Xray noted to show acute fractures of bilateral pubic rami. CT pelvis done showing acute fracture of superior and inferior pubic rami and right sacral ala. Hematoma in right lower pelvis. . Code stroke called in ED due to facial droop and right UE weakness. 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 TTE w PFO, Pt planned for d/c to acute rehab 3/7, however cancelled due to elevated WBC (13.31 which has uptrended to 19k today. ID called for the same.     WORKUP  UA: Negative  3/7 CXR IMPRESSION: Low lung volumes. New bibasilar opacities which could be due to atelectasis, small pleural effusions with associated passive atelectasis, and/or pneumonia.  Venous Duplex (3/2): No evidence of deep venous thrombosis in either lower extremity.  MR head (2/25): Acute infarct involving the left posterior putamen and frontal corona radiata. No acute intracranial hemorrhage.  CT Pelvis:  Acute fractures of the superior and inferior pubic rami, bilaterally and of the right sacral ala. Hematoma in the right lower pelvis with slight mass effect upon the right  lateral wall of the bladder.     DIAGNOSIS and IMPRESSION  ·	Leukocytosis to 19k  ·	Bilateral pubic rami fractures with Pelvic Hematoma  ·	Acute CVA with dysphagia    Leukocytosis to 19k w/o fevers  ROS for persistent pelvic pain  CXR with possible RLL infiltrate suggestive of aspiration    RECOMMENDATIONS  Recommend Sputum cx if able, MRSA PCR and Blood cx x 2  CT chest ordered. Recommend CT pelvis with IV contrast as well to re-visualise recent hematoma as possible source  Trend WBC and monitor for fevers OFF abx    Pt seen and examined. Case d/w attending   Recommendation provided to primary team     Archie Bach MD, PGY5  ID fellow  [employer] Teams Preferred  After 5pm/weekends call [phone]

## 2023-03-08 NOTE — PROGRESS NOTE ADULT - ASSESSMENT
89 year old female from home AAO x3, with PMH of Afib on xarelto, CAD, HTN, HLD and history of right ankle fracture s/p repair 9 months ago, who presented to the ED s/p fall at home.     WBC of 15K on admission. Xray noted to show acute fractures of bilateral pubic rami. CT pelvis done showing acute fracture of superior and inferior pubic rami and right sacral ala. Hematoma in right lower pelvis. CXR reviewed; appears clear. Code stroke called in ED due to facial droop and right UE weakness. CT head, CTA brain and neck done with no acute abnormalities.       Acute  CVA , confirmed w MR 2/25, RUE hemiparesis, dysarthria and expressive aphasia  TTE w PFO, cardiology following  ptn was on chronic AC w Xarelto, however it appears to have been  a therapeutic failure, changed to Eliquis 5 mg bid. Neuro in agreement. cardiology in agreement  PT f/up  PMR consult done, awaiting AR  MBS done, on dysphagia diet      Speech swallow reeval   Leukocytosis CT chest   ID consult lappreciated   CT abd and Pelvis     Plan of care discussed with patients son Freddy Longo who is a physician

## 2023-03-09 NOTE — PROGRESS NOTE ADULT - ASSESSMENT
89 year old female from home AAO x3, with PMH of Afib on xarelto, CAD, HTN, HLD and history of right ankle fracture s/p repair 9 months ago, who presented to the ED s/p fall at home on 2/24.  Xray noted to show acute fractures of bilateral pubic rami. CT pelvis done showing acute fracture of superior and inferior pubic rami and right sacral ala. Hematoma in right lower pelvis. . Code stroke called in ED due to facial droop and right UE weakness. 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 TTE w PFO, Pt planned for d/c to acute rehab 3/7, however cancelled due to elevated WBC (13.31 which has uptrended to 19k today. ID called for the same.     WORKUP  UA: Negative  3/7 CXR IMPRESSION: Low lung volumes. New bibasilar opacities which could be due to atelectasis, small pleural effusions with associated passive atelectasis, and/or pneumonia.  Venous Duplex (3/2): No evidence of deep venous thrombosis in either lower extremity.  MR head (2/25): Acute infarct involving the left posterior putamen and frontal corona radiata. No acute intracranial hemorrhage.  CT Pelvis:  Acute fractures of the superior and inferior pubic rami, bilaterally and of the right sacral ala. Hematoma in the right lower pelvis with slight mass effect upon the right  lateral wall of the bladder.       DIAGNOSIS and IMPRESSION  Leukocytosis, tachycardia, SIRS   Bilateral pubic rami fractures with Pelvic Hematoma  Abnormal CXR        RECOMMENDATIONS  Recommend Sputum cx if able,   MRSA PCR, ordered.   Blood cx in lab   U/A negative   no diarrhea.   CT chest pending   CT abd/pelvis when able  trend cbc for leucocytosis, downtrending  s/p MBS, + aspiration.       Plan discussed with Medicine NP.       Lazara Chirinos  Please contact through MS Teams   If no response or past 5 pm/weekend call [phone].   Trend WBC and monitor for fevers OFF abx .     89 year old female from home AAO x3, with PMH of Afib on xarelto, CAD, HTN, HLD and history of right ankle fracture s/p repair 9 months ago, who presented to the ED s/p fall at home on 2/24.  Xray noted to show acute fractures of bilateral pubic rami. CT pelvis done showing acute fracture of superior and inferior pubic rami and right sacral ala. Hematoma in right lower pelvis. . Code stroke called in ED due to facial droop and right UE weakness. 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 TTE w PFO, Pt planned for d/c to acute rehab 3/7, however cancelled due to elevated WBC (13.31 which has uptrended to 19k today. ID called for the same.     WORKUP  UA: Negative  3/7 CXR IMPRESSION: Low lung volumes. New bibasilar opacities which could be due to atelectasis, small pleural effusions with associated passive atelectasis, and/or pneumonia.  Venous Duplex (3/2): No evidence of deep venous thrombosis in either lower extremity.  MR head (2/25): Acute infarct involving the left posterior putamen and frontal corona radiata. No acute intracranial hemorrhage.  CT Pelvis:  Acute fractures of the superior and inferior pubic rami, bilaterally and of the right sacral ala. Hematoma in the right lower pelvis with slight mass effect upon the right  lateral wall of the bladder.       DIAGNOSIS and IMPRESSION  Leukocytosis, tachycardia, SIRS   Bilateral pubic rami fractures with Pelvic Hematoma  Abnormal CXR        RECOMMENDATIONS  pt stable, non toxic, no worsening symptoms, monitor off abx   MRSA PCR, ordered.   Blood cx in lab   U/A negative   no diarrhea.   CT chest pending   CT abd/pelvis when able  trend cbc for leucocytosis, downtrending  s/p MBS, + aspiration.       Plan discussed with Medicine NP.       Lazara Chirinos  Please contact through MS Teams   If no response or past 5 pm/weekend call [phone].   Trend WBC and monitor for fevers OFF abx .

## 2023-03-09 NOTE — SWALLOW VFSS/MBS ASSESSMENT ADULT - SLP GENERAL OBSERVATIONS
Pt received in radiology secure in RAGHAVENDRA chair. Pt is A&Ox3, verbally responsive, and able to follow simple commands. +RUE weakness and mild R-sided facial droop noted. +hypophonia noted at baseline and throughout this exam.

## 2023-03-09 NOTE — SWALLOW VFSS/MBS ASSESSMENT ADULT - ORAL PHASE
Moderate oral residue improved w/ spontaneous repeat swallow Moderate oral residue improved w/ spontaneous repeat swallow/Delayed oral transit time/Uncontrolled bolus / spillover in marylin-pharynx Moderate oral residue improved w/ spontaneous repeat swallow; passive spillover of oral residue to the hypopharynx/Delayed oral transit time

## 2023-03-09 NOTE — SWALLOW VFSS/MBS ASSESSMENT ADULT - DEMONSTRATES NEED FOR REFERRAL TO ANOTHER SERVICE
Consider ENT consult for hypophonia given acute CVA and silent aspiration
Family requesting dietary for modified diet/Registered Dietitian

## 2023-03-09 NOTE — PROGRESS NOTE ADULT - SUBJECTIVE AND OBJECTIVE BOX
Patient is a 89y old  Female who presents with a chief complaint of fall; pubic rami fracture (09 Mar 2023 17:14)      SUBJECTIVE / OVERNIGHT EVENTS:     MEDICATIONS  (STANDING):  acetaminophen     Tablet .. 975 milliGRAM(s) Oral every 6 hours  apixaban 5 milliGRAM(s) Oral two times a day  chlorhexidine 2% Cloths 1 Application(s) Topical daily  metoprolol succinate ER 25 milliGRAM(s) Oral daily  polyethylene glycol 3350 17 Gram(s) Oral daily  rosuvastatin 40 milliGRAM(s) Oral at bedtime  senna 2 Tablet(s) Oral at bedtime    MEDICATIONS  (PRN):  oxyCODONE    IR 2.5 milliGRAM(s) Oral every 4 hours PRN Moderate Pain (4 - 6)      CAPILLARY BLOOD GLUCOSE        I&O's Summary    08 Mar 2023 07:01  -  09 Mar 2023 07:00  --------------------------------------------------------  IN: 1080 mL / OUT: 190 mL / NET: 890 mL    09 Mar 2023 07:01  -  09 Mar 2023 22:23  --------------------------------------------------------  IN: 960 mL / OUT: 300 mL / NET: 660 mL      T(C): 36.6 (23 @ 20:23), Max: 36.7 (23 @ 11:15)  HR: 83 (23 @ 20:23) (80 - 83)  BP: 94/56 (23 @ 20:23) (94/56 - 103/63)  RR: 18 (23 @ 20:23) (18 - 18)  SpO2: 98% (23 @ 20:23) (96% - 98%)    PHYSICAL EXAM:  GENERAL: NAD, well-developed  HEAD:  Atraumatic, Normocephalic  EYES: EOMI, PERRLA, conjunctiva and sclera clear  NECK: Supple, No JVD  CHEST/LUNG: Clear to auscultation bilaterally; No wheeze  HEART: Regular rate and rhythm; No murmurs, rubs, or gallops  ABDOMEN: Soft, Nontender, Nondistended; Bowel sounds present  EXTREMITIES:  2+ Peripheral Pulses, No clubbing, cyanosis, or edema  PSYCH: AAOx3  NEUROLOGY: non-focal  SKIN: No rashes or lesions    LABS:                        10.5   15.45 )-----------( 286      ( 09 Mar 2023 06:16 )             32.8         136  |  102  |  29<H>  ----------------------------<  113<H>  4.3   |  23  |  0.91    Ca    9.0      09 Mar 2023 06:18            Urinalysis Basic - ( 08 Mar 2023 14:13 )    Color: Yellow / Appearance: Clear / S.024 / pH: x  Gluc: x / Ketone: Negative  / Bili: Negative / Urobili: Negative   Blood: x / Protein: Trace / Nitrite: Negative   Leuk Esterase: Negative / RBC: x / WBC x   Sq Epi: x / Non Sq Epi: x / Bacteria: x        RADIOLOGY & ADDITIONAL TESTS:    Imaging Personally Reviewed:    Consultant(s) Notes Reviewed:      Care Discussed with Consultants/Other Providers:

## 2023-03-09 NOTE — PROGRESS NOTE ADULT - SUBJECTIVE AND OBJECTIVE BOX
CARDIOLOGY FOLLOW UP - Dr. Booker  DATE OF SERVICE: 3/9/23    CC  No CV complaints    REVIEW OF SYSTEMS:  CONSTITUTIONAL: No fever, weight loss, or fatigue  RESPIRATORY: No cough, wheezing, chills or hemoptysis; No Shortness of Breath  CARDIOVASCULAR: No chest pain, palpitations, passing out, dizziness, or leg swelling  GASTROINTESTINAL: No abdominal or epigastric pain. No nausea, vomiting, or hematemesis; No diarrhea or constipation. No melena or hematochezia.  VASCULAR: No edema     PHYSICAL EXAM:  T(C): 36.7 (03-09-23 @ 11:15), Max: 36.8 (03-08-23 @ 14:41)  HR: 80 (03-09-23 @ 11:15) (74 - 109)  BP: 103/63 (03-09-23 @ 11:15) (92/56 - 122/85)  RR: 18 (03-09-23 @ 11:15) (18 - 20)  SpO2: 96% (03-09-23 @ 11:15) (93% - 98%)  Wt(kg): --  I&O's Summary    08 Mar 2023 07:01  -  09 Mar 2023 07:00  --------------------------------------------------------  IN: 1080 mL / OUT: 190 mL / NET: 890 mL        Appearance: Elderly male	  Cardiovascular: Normal S1 S2,RRR, No JVD, No murmurs  Respiratory: Lungs clear to auscultation b/l  Gastrointestinal:  Soft, Non-tender, + BS	  Extremities: Normal range of motion, No clubbing, cyanosis or edema      Home Medications:  acetaminophen 325 mg oral tablet: 3 tab(s) orally every 6 hours (07 Mar 2023 11:48)  apixaban 5 mg oral tablet: 1 tab(s) orally 2 times a day (07 Mar 2023 11:48)  metoprolol succinate 25 mg oral tablet, extended release: 1 tab(s) orally once a day (at bedtime) (24 Feb 2023 17:17)  Narcan 4 mg/0.1 mL nasal spray: nasal once a day, As Needed -3 minutes if no or minimal response.  . Naloxone spray 4 mg/0.1 ml intranasal, Spray 0.1 mL into one nostril. Repeat with second device into other nostril after 2-3 minutes if no or minimal response     (07 Mar 2023 11:56)  oxyCODONE 5 mg oral tablet: 0.5 tab(s) orally every 6 hours, As Needed (07 Mar 2023 11:56)  polyethylene glycol 3350 oral powder for reconstitution: 17 gram(s) orally once a day (07 Mar 2023 11:48)  rosuvastatin 40 mg oral tablet: 1 tab(s) orally once a day (at bedtime) (07 Mar 2023 11:48)      MEDICATIONS  (STANDING):  acetaminophen     Tablet .. 975 milliGRAM(s) Oral every 6 hours  apixaban 5 milliGRAM(s) Oral two times a day  chlorhexidine 2% Cloths 1 Application(s) Topical daily  metoprolol succinate ER 25 milliGRAM(s) Oral daily  polyethylene glycol 3350 17 Gram(s) Oral daily  rosuvastatin 40 milliGRAM(s) Oral at bedtime  senna 2 Tablet(s) Oral at bedtime      TELEMETRY: 	    ECG:  	  RADIOLOGY:   DIAGNOSTIC TESTING:  [ ] Echocardiogram:  [ ]  Catheterization:  [ ] Stress Test:    OTHER: 	    LABS:	 	                            10.5   15.45 )-----------( 286      ( 09 Mar 2023 06:16 )             32.8     03-09    136  |  102  |  29<H>  ----------------------------<  113<H>  4.3   |  23  |  0.91    Ca    9.0      09 Mar 2023 06:18

## 2023-03-09 NOTE — SWALLOW VFSS/MBS ASSESSMENT ADULT - ESOPHAGEAL STAGE
+air distention and retention of material in the proximal esophagus
+retention of material within the proximal esophagus

## 2023-03-09 NOTE — SWALLOW VFSS/MBS ASSESSMENT ADULT - SLP PERTINENT HISTORY OF CURRENT PROBLEM
Patient is an 89 year old female from home AAO x3, with PMH of Afib on xarelto, CAD, HTN, HLD and history of right ankle fracture s/p repair 9 months ago, who presented to the ED s/p fall at home. WBC of 15K. Xray noted to show acute fractures of bilateral pubic rami. CT pelvis done showing acute fracture of superior and inferior pubic rami and right sacral ala. Hematoma in right lower pelvis. CXR reviewed; appears clear. Code stroke called in ED due to facial droop and right UE weakness. CT head, CTA brain and neck done with no acute abnormalities. RRT 2/25 for new onset of right sided weakness. Code stroke called at 11:52pm. NIHSS 2 upon stroke team arrival (RUE drift, R facial droop). Not a Teneceteplase candidate due to abnormal coag. Not a thrombectomy candidate due to lack of LVO.
Patient is an 89 year old female from home AAO x3, with PMH of Afib on xarelto, CAD, HTN, HLD and history of right ankle fracture s/p repair 9 months ago, who presented to the ED s/p fall at home. WBC of 15K. Xray noted to show acute fractures of bilateral pubic rami. CT pelvis done showing acute fracture of superior and inferior pubic rami and right sacral ala. Hematoma in right lower pelvis. CXR reviewed; appears clear. Code stroke called in ED due to facial droop and right UE weakness. CT head, CTA brain and neck done with no acute abnormalities. RRT 2/25 for new onset of right sided weakness. Code stroke called at 11:52pm. NIHSS 2 upon stroke team arrival (RUE drift, R facial droop). Not a Teneceteplase candidate due to abnormal coag. Not a thrombectomy candidate due to lack of LVO.

## 2023-03-09 NOTE — PROGRESS NOTE ADULT - ASSESSMENT
89 year old female from home AAO x3, with PMH of Afib on xarelto, CAD, HTN, HLD and history of right ankle fracture s/p repair 9 months ago, who presented to the ED s/p fall at home.     WBC of 15K on admission. Xray noted to show acute fractures of bilateral pubic rami. CT pelvis done showing acute fracture of superior and inferior pubic rami and right sacral ala. Hematoma in right lower pelvis. CXR reviewed; appears clear. Code stroke called in ED due to facial droop and right UE weakness. CT head, CTA brain and neck done with no acute abnormalities.       Acute  CVA , confirmed w MR 2/25, RUE hemiparesis, dysarthria and expressive aphasia  TTE w PFO, cardiology following  ptn was on chronic AC w Xarelto, however it appears to have been  a therapeutic failure, changed to Eliquis 5 mg bid. Neuro in agreement. cardiology in agreement  PT f/up  PMR consult done, awaiting AR  MBS done, on dysphagia diet      Speech swallow reeval MBS today   Leukocytosis CT chest   ID consult lappreciated   CT abd and Pelvis     Plan of care discussed with patients son Freddy Longo who is a physician

## 2023-03-09 NOTE — SWALLOW VFSS/MBS ASSESSMENT ADULT - DIAGNOSTIC IMPRESSIONS
Pt is an 89 y F who presented to the ED s/p fall at home and found to have acute fx of b/l pubic rami. Hospital course complicated by acute CVA involving the left posterior putamen and frontal corona radiata. Pt presents w/ an oropharyngeal dysphagia. The oral phase of swallow is characterized by poor bolus formation/transfer and moderate oral residue across textures which is cleared w/ a spontaneous repeat swallow. The pharyngeal phase of swallow is notable for delayed onset of pharyngeal swallow and silent aspiration of material post-swallow w/ minced/moist solids. Pt provided w/ exhaustive trials and noted w/ reduced airway protection over time. Pt is an 89 y F who presented to the ED s/p fall at home and found to have acute fx of b/l pubic rami. Hospital course complicated by acute CVA involving the left posterior putamen and frontal corona radiata. MBS performed 3/2, with recommendations for minced-moist/moderately thick liquids at that time. This exam is being performed due to concerns for aspiration. Pt presents w/ an oropharyngeal dysphagia. The oral phase of swallow is characterized by poor bolus formation/transfer and moderate oral residue across textures which is cleared w/ a spontaneous repeat swallow. The pharyngeal phase of swallow is notable for delayed onset of pharyngeal swallow and silent aspiration of oral cavity residue w/ minced/moist solids. Pt provided w/ exhaustive trials and noted w/ reduced airway protection over time. Compensatory strategies employed and Pt is unable to demonstrate consistent carryover despite multiple cues.

## 2023-03-09 NOTE — PROGRESS NOTE ADULT - ASSESSMENT
Echo 4/6/21: Nml LV fxn EF 61%  Echo 2/27/23: Mild anteroseptal hypokinesis EF 45-50%, Mild diastolic dysfxn, Patent foramen ovale    A/P  89 year old female from home AAO x3, with PMH of Afib on xarelto, CAD, HTN, HLD and history of right ankle fracture s/p repair 9 months ago, who presented to the ED s/p fall at home, found to have b/l pubic rami fx with hematoma and acute infarct on MRI.    #Acute CVA  -Likely cause of fall given reported hx and no cardiac prodrome sx  -Neuro following  -Continue atorvastatin  -Prior echo with nml lv fxn  -Echo with PFO, anteroseptal hypokinesis  -LE duplex negative for DVT  -F/u neurology  -Likely xarelto failure  -Continue apixaban    #Afib  -NSR on tele  -Continue metoprolol 25mg qd  -Continue apixaban  -Echo results as above    #CAD  -Continue atorvastatin    #HTN  -Continue metoprolol  -May continue to hold ace/arb on hold for hypotension    #Pubic rami fx w/ hematoma  -Per ortho no surgical intervention  -Monitor h/h while on apixaban Echo 4/6/21: Nml LV fxn EF 61%  Echo 2/27/23: Mild anteroseptal hypokinesis EF 45-50%, Mild diastolic dysfxn, Patent foramen ovale    A/P  89 year old female from home AAO x3, with PMH of Afib on xarelto, CAD, HTN, HLD and history of right ankle fracture s/p repair 9 months ago, who presented to the ED s/p fall at home, found to have b/l pubic rami fx with hematoma and acute infarct on MRI.    #Acute CVA  -Likely cause of fall given reported hx and no cardiac prodrome sx  -Neuro following  -Continue atorvastatin  -Prior echo with nml lv fxn  -Echo with PFO, anteroseptal hypokinesis  -LE duplex negative for DVT  -F/u neurology  -Likely xarelto failure  -Continue apixaban    #Afib  -NSR on tele  -Continue metoprolol 25mg qd  -Continue apixaban  -Echo results as above    #CAD  -Continue atorvastatin    #HTN  -Continue metoprolol  -May continue to hold ace/arb for hypotension    #Pubic rami fx w/ hematoma  -Per ortho no surgical intervention  -Monitor h/h while on apixaban

## 2023-03-09 NOTE — SWALLOW VFSS/MBS ASSESSMENT ADULT - ADDITIONAL INFORMATION
+calcifications along laryngeal structures  +lordosis of the cervical spine +calcifications along laryngeal structures  +lordosis of the cervical spine  +air distention in the proximal esophagus

## 2023-03-09 NOTE — PROGRESS NOTE ADULT - SUBJECTIVE AND OBJECTIVE BOX
89yPatient is a 89y old  Female who presents with a chief complaint of fall; pubic rami fracture (09 Mar 2023 13:00)      Interval history:  Afebrile, sitting in chair, no new symptoms.       Allergies:   No Known Allergies      Antimicrobials:      REVIEW OF SYSTEMS:  No chest pain   No SOB  No abdominal pain  No rash.       Vital Signs Last 24 Hrs  T(C): 36.7 (03-09-23 @ 11:15), Max: 36.8 (03-08-23 @ 22:20)  T(F): 98.1 (03-09-23 @ 11:15), Max: 98.3 (03-08-23 @ 22:20)  HR: 80 (03-09-23 @ 11:15) (74 - 109)  BP: 103/63 (03-09-23 @ 11:15) (102/60 - 122/85)  BP(mean): --  RR: 18 (03-09-23 @ 11:15) (18 - 20)  SpO2: 96% (03-09-23 @ 11:15) (96% - 98%)      PHYSICAL EXAM:                              10.5   15.45 )-----------( 286      ( 09 Mar 2023 06:16 )             32.8   03-09    136  |  102  |  29<H>  ----------------------------<  113<H>  4.3   |  23  |  0.91    Ca    9.0      09 Mar 2023 06:18                Radiology:       89yPatient is a 89y old  Female who presents with a chief complaint of fall; pubic rami fracture (09 Mar 2023 13:00)      Interval history:  Afebrile, sitting in chair, no new symptoms.       Allergies:   No Known Allergies      Antimicrobials:      REVIEW OF SYSTEMS:  No chest pain   No SOB  No abdominal pain  No rash.       Vital Signs Last 24 Hrs  T(C): 36.7 (03-09-23 @ 11:15), Max: 36.8 (03-08-23 @ 22:20)  T(F): 98.1 (03-09-23 @ 11:15), Max: 98.3 (03-08-23 @ 22:20)  HR: 80 (03-09-23 @ 11:15) (74 - 109)  BP: 103/63 (03-09-23 @ 11:15) (102/60 - 122/85)  BP(mean): --  RR: 18 (03-09-23 @ 11:15) (18 - 20)  SpO2: 96% (03-09-23 @ 11:15) (96% - 98%)      PHYSICAL EXAM:  Patient in no acute distress. Alert, awake.   Cardiovascular: S1S2 normal.  Lungs: + air entry B/L lung fields.  Gastrointestinal: soft, nontender, nondistended.  IV sites not inflamed.                             10.5   15.45 )-----------( 286      ( 09 Mar 2023 06:16 )             32.8   03-09    136  |  102  |  29<H>  ----------------------------<  113<H>  4.3   |  23  |  0.91    Ca    9.0      09 Mar 2023 06:18        Radiology:    < from: Xray Chest 1 View- PORTABLE-Routine (Xray Chest 1 View- PORTABLE-Routine .) (03.07.23 @ 15:10) >  IMPRESSION:  Low lung volumes.    New bibasilar opacities which could be due to atelectasis, small pleural   effusions with associated passive atelectasis, and/or pneumonia.

## 2023-03-09 NOTE — SWALLOW VFSS/MBS ASSESSMENT ADULT - PHARYNGEAL PHASE COMMENTS
Exhaustive trials presented both on and off fluoro  +delayed onset of pharyngeal swallow and poor airway protection noted following exhaustive trials which is indicative of fatigue over time Exhaustive trials presented both on and off fluoro; no change in pharyngeal function w/ this consistency across trials Exhaustive trials presented both on and off fluoro  +delayed onset of pharyngeal swallow and poor airway protection observed overt time

## 2023-03-09 NOTE — SWALLOW VFSS/MBS ASSESSMENT ADULT - COMMENTS
Neurology consult: CT head: neg. CTA/P: neg. Impression:  acute R arm drift and R facial droop. NIHSS 2. Repeat CTH/A/P unremarkable. DDx ischemic stroke, TIA, toxic/metabolic/infectious.  Occupational therapy evaluation: Scored 2 error points out of 28 on Short Blessed Test (cog screen) indicating normal cognition. Recommend acute rehab.     2/24 Initial dysphagia screen at 14:30: Fail due to inability to control saliva  2/24 Second dysphagia screen at 16:30: Pass, initiated on regular diet    2/24 CXR IMPRESSION: Clear lungs  2/25 MRI HEAD IMPRESSION: Acute infarct involving the left posterior putamen and frontal corona radiata. No acute intracranial hemorrhage. Report also sent to the referring providers via [employer] teams message.    SWALLOW HISTORY: No reports in SCM or in PACS prior to this admission.
Neurology consult: CT head: neg. CTA/P: neg. Impression:  acute R arm drift and R facial droop. NIHSS 2. Repeat CTH/A/P unremarkable. DDx ischemic stroke, TIA, toxic/metabolic/infectious.  Occupational therapy evaluation: Scored 2 error points out of 28 on Short Blessed Test (cog screen) indicating normal cognition. Recommend acute rehab.   3/7 Pt planned for d/c to acute rehab, however cancelled due to elevated WBC (13.31) and CXR w/ new bibasilar opacities. Team concerned for aspiration PNA at this time.     2/25 MRI HEAD IMPRESSION: Acute infarct involving the left posterior putamen and frontal corona radiata. No acute intracranial hemorrhage. Report also sent to the referring providers via [employer] teams message.  3/7 CXR: IMPRESSION:  Low lung volumes. New bibasilar opacities which could be due to atelectasis, small pleural effusions with associated passive atelectasis, and/or pneumonia.    SWALLOW HX: MBS completed 3/2 with recommendations for minced-moist/moderately thick liquids. The oral phase of swallow is characterized by effortful mastication and moderate oral residue w/ soft/bite-sized solids. The pharyngeal phase of swallow is notable for deep penetration of mildly thick liquids during/after the swallow and silent aspiration of thin liquids. Strategies which improved airway protection w/ mildly thick liquids include chin tuck posture and swallow x2.

## 2023-03-09 NOTE — SWALLOW VFSS/MBS ASSESSMENT ADULT - RECOMMENDED FEEDING/EATING TECHNIQUES
allow for swallow between intakes/crush medication (when feasible)/maintain upright posture during/after eating for 30 mins/oral hygiene/position upright (90 degrees)/small sips/bites
allow for swallow between intakes/crush medication (when feasible)/maintain upright posture during/after eating for 30 mins/oral hygiene/position upright (90 degrees)

## 2023-03-09 NOTE — SWALLOW VFSS/MBS ASSESSMENT ADULT - UNSUCCESSFUL STRATEGIES TRIALED DURING PROCEDURE
chin tuck Chin tuck posture employed to provide increased airway protection; strategy unsuccessful due to poor carryover by Pt

## 2023-03-09 NOTE — SWALLOW VFSS/MBS ASSESSMENT ADULT - LARYNGEAL PENETRATION AFTER THE SWALLOW - SILENT
Deep penetration of oral residue which passively spilled to the hypopharynx after initial swallow/Mild

## 2023-03-09 NOTE — SWALLOW VFSS/MBS ASSESSMENT ADULT - ASPIRATION AFTER SWALLOW - SILENT
Silent aspiration of residue (off fluoro) w/ material remaining in the trachea despite cued cough and significantly delayed cough/Mild Pt fed off fluoro to provide further trials, when fluoro resumed, material noted along the vocal cords and anterior portion of the trachea; material remaining in the trachea despite cued cough and significantly delayed cough/Mild

## 2023-03-10 NOTE — PROGRESS NOTE ADULT - SUBJECTIVE AND OBJECTIVE BOX
Patient is a 89y old  Female who presents with a chief complaint of fall; pubic rami fracture (10 Mar 2023 17:29)      SUBJECTIVE / OVERNIGHT EVENTS:    Events noted.  Poor po intake  No N/V    MEDICATIONS  (STANDING):  acetaminophen     Tablet .. 975 milliGRAM(s) Oral every 6 hours  apixaban 5 milliGRAM(s) Oral two times a day  chlorhexidine 2% Cloths 1 Application(s) Topical daily  dextrose 5% + sodium chloride 0.9%. 1000 milliLiter(s) (30 mL/Hr) IV Continuous <Continuous>  metoprolol succinate ER 25 milliGRAM(s) Oral daily  multivitamin 1 Tablet(s) Oral daily  polyethylene glycol 3350 17 Gram(s) Oral daily  rosuvastatin 40 milliGRAM(s) Oral at bedtime  senna 2 Tablet(s) Oral at bedtime    MEDICATIONS  (PRN):  oxyCODONE    IR 2.5 milliGRAM(s) Oral every 4 hours PRN Moderate Pain (4 - 6)        CAPILLARY BLOOD GLUCOSE        I&O's Summary    09 Mar 2023 07:01  -  10 Mar 2023 07:00  --------------------------------------------------------  IN: 960 mL / OUT: 700 mL / NET: 260 mL    10 Mar 2023 07:01  -  10 Mar 2023 23:58  --------------------------------------------------------  IN: 800 mL / OUT: 0 mL / NET: 800 mL        T(C): 37 (03-10-23 @ 21:57), Max: 37 (03-10-23 @ 21:57)  HR: 91 (03-10-23 @ 21:57) (81 - 91)  BP: 112/66 (03-10-23 @ 21:57) (100/58 - 112/66)  RR: 18 (03-10-23 @ 21:57) (18 - 18)  SpO2: 94% (03-10-23 @ 21:57) (94% - 98%)    PHYSICAL EXAM:    NECK: Supple, No JVD  CHEST/LUNG: Clear to auscultation bilaterally; No wheezing.  HEART: Regular rate and rhythm; No murmurs, rubs, or gallops  ABDOMEN: Soft, Nontender, Nondistended; Bowel sounds present  EXTREMITIES:   No edema  NEUROLOGY: AA      LABS:                        9.4    13.44 )-----------( 242      ( 10 Mar 2023 05:36 )             30.1     03-10    137  |  104  |  38<H>  ----------------------------<  118<H>  4.5   |  22  |  0.89    Ca    8.6      10 Mar 2023 01:51  Phos  4.1     03-10  Mg     2.1     03-10              CAPILLARY BLOOD GLUCOSE            RADIOLOGY & ADDITIONAL TESTS:    Imaging Personally Reviewed:    Consultant(s) Notes Reviewed:      Care Discussed with Consultants/Other Providers:    Francisco Cheng MD, CMD, FACP    [id number] [place] [address]  Office Tel: [phone]  Cell: [phone]

## 2023-03-10 NOTE — PROGRESS NOTE ADULT - SUBJECTIVE AND OBJECTIVE BOX
89yPatient is a 89y old  Female who presents with a chief complaint of fall; pubic rami fracture (10 Mar 2023 10:26)      Interval history:  Afebrile, had episode of SVT, felt lightheaded during that episode.         Allergies:   No Known Allergies      Antimicrobials:      REVIEW OF SYSTEMS:  No chest pain   No SOB  No abdominal pain  No rash.       Vital Signs Last 24 Hrs  T(C): 37 (03-10-23 @ 21:57), Max: 37 (03-10-23 @ 21:57)  T(F): 98.6 (03-10-23 @ 21:57), Max: 98.6 (03-10-23 @ 21:57)  HR: 91 (03-10-23 @ 21:57) (81 - 91)  BP: 112/66 (03-10-23 @ 21:57) (100/58 - 112/66)  BP(mean): --  RR: 18 (03-10-23 @ 21:57) (18 - 18)  SpO2: 94% (03-10-23 @ 21:57) (94% - 98%)      PHYSICAL EXAM:  Patient in no acute distress. Alert, awake.   Cardiovascular: S1S2 normal.  Lungs: + air entry B/L lung fields.  Gastrointestinal: soft, nontender, nondistended.  IV sites not inflamed.                             9.4    13.44 )-----------( 242      ( 10 Mar 2023 05:36 )             30.1   03-10    137  |  104  |  38<H>  ----------------------------<  118<H>  4.5   |  22  |  0.89    Ca    8.6      10 Mar 2023 01:51  Phos  4.1     03-10  Mg     2.1     03-10        Culture - Blood (collected 08 Mar 2023 20:57)  Source: .Blood Blood  Preliminary Report (10 Mar 2023 01:02):    No growth to date.    Culture - Blood (collected 08 Mar 2023 20:57)  Source: .Blood Blood  Preliminary Report (10 Mar 2023 01:02):    No growth to date.

## 2023-03-10 NOTE — PROGRESS NOTE ADULT - ASSESSMENT
Echo 4/6/21: Nml LV fxn EF 61%  Echo 2/27/23: Mild anteroseptal hypokinesis EF 45-50%, Mild diastolic dysfxn, Patent foramen ovale    A/P  89 year old female from home AAO x3, with PMH of Afib on xarelto, CAD, HTN, HLD and history of right ankle fracture s/p repair 9 months ago, who presented to the ED s/p fall at home, found to have b/l pubic rami fx with hematoma and acute infarct on MRI.    #Acute CVA  -Likely cause of fall given reported hx and no cardiac prodrome sx  -Neuro following  -Continue atorvastatin  -Prior echo with nml lv fxn  -Echo with PFO, anteroseptal hypokinesis  -LE duplex negative for DVT  -F/u neurology  -Likely xarelto failure  -Continue apixaban    #Afib  -NSR on tele  -Continue metoprolol 25mg qd  -Continue apixaban  -Echo results as above    #CAD  -Continue atorvastatin    #HTN  -Continue metoprolol  -May continue to hold ace/arb for hypotension    #Pubic rami fx w/ hematoma  -Per ortho no surgical intervention  -Monitor h/h while on apixaban    #Leukocytosis  -F/u CT chest, abd, pelvis  -Abx per primary, id

## 2023-03-10 NOTE — PROGRESS NOTE ADULT - SUBJECTIVE AND OBJECTIVE BOX
CARDIOLOGY FOLLOW UP - Dr. Booker  DATE OF SERVICE: 3/10/23    CC  No CV events    REVIEW OF SYSTEMS:  CONSTITUTIONAL: No fever, weight loss, or fatigue  RESPIRATORY: No cough, wheezing, chills or hemoptysis; No Shortness of Breath  CARDIOVASCULAR: No chest pain, palpitations, passing out, dizziness, or leg swelling  GASTROINTESTINAL: No abdominal or epigastric pain. No nausea, vomiting, or hematemesis; No diarrhea or constipation. No melena or hematochezia.  VASCULAR: No edema     PHYSICAL EXAM:  T(C): 36.6 (03-10-23 @ 04:26), Max: 36.7 (03-09-23 @ 11:15)  HR: 81 (03-10-23 @ 04:26) (80 - 90)  BP: 106/67 (03-10-23 @ 04:26) (94/56 - 106/67)  RR: 18 (03-10-23 @ 04:26) (18 - 18)  SpO2: 96% (03-10-23 @ 04:26) (96% - 98%)  Wt(kg): --  I&O's Summary    09 Mar 2023 07:01  -  10 Mar 2023 07:00  --------------------------------------------------------  IN: 960 mL / OUT: 700 mL / NET: 260 mL        Appearance: Elderly female	  Cardiovascular: Normal S1 S2,RRR, No JVD, No murmurs  Respiratory: Lungs clear to auscultation	  Gastrointestinal:  Soft, Non-tender, + BS	  Extremities: Normal range of motion, No clubbing, cyanosis or edema      Home Medications:  acetaminophen 325 mg oral tablet: 3 tab(s) orally every 6 hours (07 Mar 2023 11:48)  apixaban 5 mg oral tablet: 1 tab(s) orally 2 times a day (07 Mar 2023 11:48)  metoprolol succinate 25 mg oral tablet, extended release: 1 tab(s) orally once a day (at bedtime) (24 Feb 2023 17:17)  Narcan 4 mg/0.1 mL nasal spray: nasal once a day, As Needed -3 minutes if no or minimal response.  . Naloxone spray 4 mg/0.1 ml intranasal, Spray 0.1 mL into one nostril. Repeat with second device into other nostril after 2-3 minutes if no or minimal response     (07 Mar 2023 11:56)  oxyCODONE 5 mg oral tablet: 0.5 tab(s) orally every 6 hours, As Needed (07 Mar 2023 11:56)  polyethylene glycol 3350 oral powder for reconstitution: 17 gram(s) orally once a day (07 Mar 2023 11:48)  rosuvastatin 40 mg oral tablet: 1 tab(s) orally once a day (at bedtime) (07 Mar 2023 11:48)      MEDICATIONS  (STANDING):  acetaminophen     Tablet .. 975 milliGRAM(s) Oral every 6 hours  apixaban 5 milliGRAM(s) Oral two times a day  chlorhexidine 2% Cloths 1 Application(s) Topical daily  metoprolol succinate ER 25 milliGRAM(s) Oral daily  polyethylene glycol 3350 17 Gram(s) Oral daily  rosuvastatin 40 milliGRAM(s) Oral at bedtime  senna 2 Tablet(s) Oral at bedtime      TELEMETRY: Sinus 70s	    ECG:  	  RADIOLOGY:   DIAGNOSTIC TESTING:  [ ] Echocardiogram:  [ ]  Catheterization:  [ ] Stress Test:    OTHER: 	    LABS:	 	                            9.4    13.44 )-----------( 242      ( 10 Mar 2023 05:36 )             30.1     03-10    137  |  104  |  38<H>  ----------------------------<  118<H>  4.5   |  22  |  0.89    Ca    8.6      10 Mar 2023 01:51  Phos  4.1     03-10  Mg     2.1     03-10

## 2023-03-10 NOTE — CHART NOTE - NSCHARTNOTEFT_GEN_A_CORE
Nutrition Follow Up Note  Patient seen for: consult for assessment and calorie count initiation   Chart reviewed, events noted.    "89 year old female from home AAO x3, with PMH of Afib on xarelto, CAD, HTN, HLD and history of right ankle fracture s/p repair 9 months ago, who presented to the ED s/p fall at home. "    Source: [x] Patient       [x] EMR        [x] RN        [x] Family at bedside       [x] Other: case management, NP, speech pathologist     -If unable to interview patient: [] Trach/Vent/BiPAP  [] Disoriented/confused/inappropriate to interview    Diet Order:   Diet, Pureed:   Moderately Thick Liquids (MODTHICKLIQS) (03-09-23)    - Is current order appropriate/adequate? [] Yes  []  No:     - PO intake :   [] >75%  Adequate    [] 50-75%  Fair       [x] <50%  Poor  very poor PO intake per pt and family report     - Nutrition-related concerns:  - poor PO intake related to reduced appetite, and dislikes some food within puree mod thick diet "needs more variety"- per pt's daughter   - speech pathologist recommended 3/9 "Puree/moderately thick liquids...  allow for swallow between intakes; crush medication (when feasible); maintain upright posture during/after eating for 30 mins; oral hygiene; position upright (90 degrees); small sips/bites... set up only required... Strict aspiration and reflux precautions!"  - pt concerned about pt hydration status; nutrition reviewed with pt and pt's daughter; defer fluid supplementation to team  - complains of constipation (vs. poor PO); ordered for bowel regimens; monitor     GI:  Last BM today, per pt  Bowel Regimen? [x] Yes- miralax, senna  [] No  Pt denies nausea, vomiting, diarrhea, or constipation.     Weights:   Daily N/A  Drug Dosing Weight  Height (cm): 160 (24 Feb 2023 09:43)  Weight (kg): 54.9 (24 Feb 2023 09:43)  BMI (kg/m2): 21.4 (24 Feb 2023 09:43)  BSA (m2): 1.56 (24 Feb 2023 09:43)    Nutritionally Pertinent MEDICATIONS  (STANDING):  metoprolol succinate ER  polyethylene glycol 3350  rosuvastatin  senna    Pertinent Labs: 03-10 @ 01:51: Na 137, BUN 38<H>, Cr 0.89, <H>, K+ 4.5, Phos 4.1, Mg 2.1, Alk Phos --, ALT/SGPT --, AST/SGOT --, HbA1c --    A1C with Estimated Average Glucose Result: 5.2 % (02-25-23 @ 07:08)    Skin per nursing documentation: no noted pressure injuries as per flowsheets   Edema: +3 R knee edema as per flowsheets     Estimated Needs:   [x] no change since previous assessment  [] recalculated:     Previous Nutrition Diagnosis: acute moderate protein calorie malnutrition ; [place] Difficulty   Nutrition Diagnosis is: [x] ongoing  [] resolved [] not applicable     New Nutrition Diagnosis: acute severe protein calorie malnutrition related to inadequate protein energy intake as evidenced by <50% EER>/=5 days, mild muscle fat loss, +3 edema   goal: Pt to meet >75% of estimated nutritional needs during hospital stay.     Nutrition Care Plan:  [x] In Progress- addressed with diet order, PO encouragement and nutritional supplements; calorie count ordered   [] Achieved  [] Not applicable    Nutrition Interventions:     Education Provided:       [] Yes:  [] No:   - Encouraged adequate PO intake for meeting nutritional needs, improving nutritional status and for preventing wt loss   - Food preferences obtained and updated. reviewed nutrient dense snacks/supplements to add,  menu provided. RD to honor as able  - Calorie count ordered, discussed with RN, placed in pt's room. RD to follow up upon completion        Recommendations:      - CALORIE COUNT PLACED; RD to assess and follow up upon completion   - Continue current diet order; Defer diet/fluid consistencies to medical team/SLP recommendations.   - add oral nutrition supplement: (pt dislikes ensure); [employer] vanilla thickened to moderately thick; mixed with a fruit per pt request   - magic cup will be added, BID  - If not medically contraindicated, consider adding appetite stimulant; discussed with NP  - defer hydration to team   - If not medically contraindicated, recommend Multivitamin daily   - NEW Malnutrition sticker placed, RD to follow-up as per protocol   - monitor BM per complains of constipation (vs. poor PO); ordered for bowel regimens; monitor   - Will continue to monitor PO intake, weight, labs, skin, GI status, diet.   - Nutrition care plan to remain consistent with pt goals of care  - RD remains available to review diet education and adjust diet recommendations as needed.       Monitoring and Evaluation:   Continue to monitor nutritional intake, tolerance to diet prescription, weights, labs, skin integrity      RD remains available upon request and will follow up per protocol  Francisca Pantoja RD CDN #[phone] or Teams (preferred) Nutrition Follow Up Note  Patient seen for: consult for assessment and calorie count initiation   Chart reviewed, events noted.    "89 year old female from home AAO x3, with PMH of Afib on xarelto, CAD, HTN, HLD and history of right ankle fracture s/p repair 9 months ago, who presented to the ED s/p fall at home. "    Source: [x] Patient       [x] EMR        [x] RN        [x] Family at bedside       [x] Other: case management, NP, speech pathologist     -If unable to interview patient: [] Trach/Vent/BiPAP  [] Disoriented/confused/inappropriate to interview    Diet Order:   Diet, Pureed:   Moderately Thick Liquids (MODTHICKLIQS) (03-09-23)    - Is current order appropriate/adequate? [] Yes  []  No:     - PO intake :   [] >75%  Adequate    [] 50-75%  Fair       [x] <50%  Poor  very poor PO intake per pt and family report     - Nutrition-related concerns:  - poor PO intake related to reduced appetite, and dislikes some food within puree mod thick diet "needs more variety"- per pt's daughter   - speech pathologist recommended 3/9 "Puree/moderately thick liquids...  allow for swallow between intakes; crush medication (when feasible); maintain upright posture during/after eating for 30 mins; oral hygiene; position upright (90 degrees); small sips/bites... set up only required... Strict aspiration and reflux precautions!"  - pt concerned about pt hydration status; nutrition reviewed with pt and pt's daughter; defer fluid supplementation to team  - complains of constipation (vs. poor PO); ordered for bowel regimens; monitor     GI:  Last BM today, per pt  Bowel Regimen? [x] Yes- miralax, senna  [] No  Pt denies nausea, vomiting, diarrhea, or constipation.     Weights:   Daily N/A  Drug Dosing Weight  Height (cm): 160 (24 Feb 2023 09:43)  Weight (kg): 54.9 (24 Feb 2023 09:43)  BMI (kg/m2): 21.4 (24 Feb 2023 09:43)  BSA (m2): 1.56 (24 Feb 2023 09:43)    Nutritionally Pertinent MEDICATIONS  (STANDING):  metoprolol succinate ER  polyethylene glycol 3350  rosuvastatin  senna    Pertinent Labs: 03-10 @ 01:51: Na 137, BUN 38<H>, Cr 0.89, <H>, K+ 4.5, Phos 4.1, Mg 2.1, Alk Phos --, ALT/SGPT --, AST/SGOT --, HbA1c --    A1C with Estimated Average Glucose Result: 5.2 % (02-25-23 @ 07:08)    Skin per nursing documentation: no noted pressure injuries as per flowsheets   Edema: +3 R knee edema as per flowsheets     Estimated Needs:   [x] no change since previous assessment  [] recalculated:     Previous Nutrition Diagnosis: acute moderate protein calorie malnutrition ; [place] Difficulty   Nutrition Diagnosis is: [x] ongoing  [] resolved [] not applicable     New Nutrition Diagnosis: acute severe protein calorie malnutrition related to inadequate protein energy intake as evidenced by <50% EER>/=5 days, mild muscle fat loss, +3 edema   goal: Pt to meet >75% of estimated nutritional needs during hospital stay.     Nutrition Care Plan:  [x] In Progress- addressed with diet order, PO encouragement and nutritional supplements; calorie count ordered   [] Achieved  [] Not applicable    Nutrition Interventions:     Education Provided:       [] Yes:  [] No:   - Encouraged adequate PO intake for meeting nutritional needs, improving nutritional status and for preventing wt loss   - Food preferences obtained and updated. reviewed nutrient dense snacks/supplements to add,  menu provided. RD to honor as able  - Calorie count ordered, discussed with RN, placed in pt's room. RD to follow up upon completion        Recommendations:      - CALORIE COUNT PLACED; RD to assess and follow up upon completion   - Continue current diet order; Defer diet/fluid consistencies to medical team/SLP recommendations.   - add oral nutrition supplement: (pt dislikes ensure); [employer] x3/day , thickened to moderately thick; mixed with a fruit per pt request   - magic cup will be added, BID  - If not medically contraindicated, consider adding appetite stimulant; discussed with NP  - defer hydration to team   - If not medically contraindicated, recommend Multivitamin daily   - NEW Malnutrition sticker placed, RD to follow-up as per protocol   - monitor BM per complains of constipation (vs. poor PO); ordered for bowel regimens; monitor   - Will continue to monitor PO intake, weight, labs, skin, GI status, diet.   - Nutrition care plan to remain consistent with pt goals of care  - RD remains available to review diet education and adjust diet recommendations as needed.       Monitoring and Evaluation:   Continue to monitor nutritional intake, tolerance to diet prescription, weights, labs, skin integrity      RD remains available upon request and will follow up per protocol  Francisca Pantoja RD CDN #[phone] or Teams (preferred)

## 2023-03-10 NOTE — PROGRESS NOTE ADULT - ASSESSMENT
89 year old female from home AAO x3, with PMH of Afib on xarelto, CAD, HTN, HLD and history of right ankle fracture s/p repair 9 months ago, who presented to the ED s/p fall at home on 2/24.  Xray noted to show acute fractures of bilateral pubic rami. CT pelvis done showing acute fracture of superior and inferior pubic rami and right sacral ala. Hematoma in right lower pelvis. . Code stroke called in ED due to facial droop and right UE weakness. 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 TTE w PFO, Pt planned for d/c to acute rehab 3/7, however cancelled due to elevated WBC (13.31 which has uptrended to 19k today. ID called for the same.     WORKUP  UA: Negative  3/7 CXR IMPRESSION: Low lung volumes. New bibasilar opacities which could be due to atelectasis, small pleural effusions with associated passive atelectasis, and/or pneumonia.  Venous Duplex (3/2): No evidence of deep venous thrombosis in either lower extremity.  MR head (2/25): Acute infarct involving the left posterior putamen and frontal corona radiata. No acute intracranial hemorrhage.  CT Pelvis:  Acute fractures of the superior and inferior pubic rami, bilaterally and of the right sacral ala. Hematoma in the right lower pelvis with slight mass effect upon the right  lateral wall of the bladder.       DIAGNOSIS and IMPRESSION  Leukocytosis, tachycardia, SIRS   Bilateral pubic rami fractures with Pelvic Hematoma  Abnormal CXR        RECOMMENDATIONS  pt stable, non toxic, no worsening symptoms, monitor off abx   MRSA PCR, negative   Blood cx NTD   U/A negative   no diarrhea.   CT chest pending   CT abd/pelvis not needed anymore, leucocytosis downtrending spontaneously.   trend cbc for leucocytosis, downtrending  s/p MBS, + aspiration.       Plan discussed with Medicine NP.       Lazara Chirinos  Please contact through MS Teams   If no response or past 5 pm/weekend call [phone].   Trend WBC and monitor for fevers OFF abx .

## 2023-03-10 NOTE — PROGRESS NOTE ADULT - ASSESSMENT
89 year old female from home AAO x3, with PMH of Afib on xarelto, CAD, HTN, HLD and history of right ankle fracture s/p repair 9 months ago, who presented to the ED s/p fall at home.     WBC of 15K on admission. Xray noted to show acute fractures of bilateral pubic rami. CT pelvis done showing acute fracture of superior and inferior pubic rami and right sacral ala. Hematoma in right lower pelvis. CXR reviewed; appears clear. Code stroke called in ED due to facial droop and right UE weakness. CT head, CTA brain and neck done with no acute abnormalities.       Acute  CVA , confirmed w MR 2/25, RUE hemiparesis, dysarthria and expressive aphasia  TTE w PFO, cardiology following  ptn was on chronic AC w Xarelto, however it appears to have been  a therapeutic failure, changed to Eliquis 5 mg bid. Neuro in agreement. cardiology in agreement  PT f/up  PMR consult done, awaiting AR  MBS done, on dysphagia diet  Poor po intake- IVF      Leukocytosis CT chest   ID f/up lappreciated   CT abd and Pelvis     Plan of care discussed with patients son Freddy Longo who is a physician

## 2023-03-10 NOTE — PROVIDER CONTACT NOTE (OTHER) - SITUATION
Pt on aspiration precautions difficultly swallowing foods and liquids Pt on aspiration precautions difficultly swallowing foods and liquids. Pt stated "haven't ate or drank much"

## 2023-03-11 NOTE — PROGRESS NOTE ADULT - ASSESSMENT
Echo 4/6/21: Nml LV fxn EF 61%  Echo 2/27/23: Mild anteroseptal hypokinesis EF 45-50%, Mild diastolic dysfxn, Patent foramen ovale    A/P  89 year old female from home AAO x3, with PMH of Afib on xarelto, CAD, HTN, HLD and history of right ankle fracture s/p repair 9 months ago, who presented to the ED s/p fall at home, found to have b/l pubic rami fx with hematoma and acute infarct on MRI.    #Acute CVA  -Likely cause of fall given reported hx and no cardiac prodrome sx  -Neuro following  -Continue atorvastatin  -Prior echo with nml lv fxn  -Echo with PFO, anteroseptal hypokinesis  -LE duplex negative for DVT  -F/u neurology  -Likely xarelto failure  -Continue apixaban    #Afib  -NSR on tele  -Continue metoprolol 25mg qd  -Continue apixaban  -Echo results as above    #CAD  -Continue atorvastatin    #HTN  -Continue metoprolol  -May continue to hold ace/arb for hypotension    #Pubic rami fx w/ hematoma  -Per ortho no surgical intervention  -Monitor h/h while on apixaban    #Leukocytosis  -F/u CT chest, abd, pelvis  -Abx per primary, id     d/w fam at bedside    35 minutes spent on total encounter; more than 50% of the visit was spent counseling and/or coordinating care by the attending physician.

## 2023-03-11 NOTE — PROGRESS NOTE ADULT - SUBJECTIVE AND OBJECTIVE BOX
Patient is a 89y old  Female who presents with a chief complaint of fall; pubic rami fracture (10 Mar 2023 17:29)      SUBJECTIVE / OVERNIGHT EVENTS:    Events noted.  Poor po intake  No N/V    MEDICATIONS  (STANDING):  acetaminophen     Tablet .. 975 milliGRAM(s) Oral every 6 hours  apixaban 5 milliGRAM(s) Oral two times a day  chlorhexidine 2% Cloths 1 Application(s) Topical daily  dextrose 5% + sodium chloride 0.9%. 1000 milliLiter(s) (30 mL/Hr) IV Continuous <Continuous>  metoprolol succinate ER 25 milliGRAM(s) Oral daily  multivitamin 1 Tablet(s) Oral daily  polyethylene glycol 3350 17 Gram(s) Oral daily  rosuvastatin 40 milliGRAM(s) Oral at bedtime  senna 2 Tablet(s) Oral at bedtime    MEDICATIONS  (PRN):  oxyCODONE    IR 2.5 milliGRAM(s) Oral every 4 hours PRN Moderate Pain (4 - 6)        CAPILLARY BLOOD GLUCOSE        I&O's Summary    09 Mar 2023 07:01  -  10 Mar 2023 07:00  --------------------------------------------------------  IN: 960 mL / OUT: 700 mL / NET: 260 mL    10 Mar 2023 07:01  -  10 Mar 2023 23:58  --------------------------------------------------------  IN: 800 mL / OUT: 0 mL / NET: 800 mL        T(C): 37 (03-10-23 @ 21:57), Max: 37 (03-10-23 @ 21:57)  HR: 91 (03-10-23 @ 21:57) (81 - 91)  BP: 112/66 (03-10-23 @ 21:57) (100/58 - 112/66)  RR: 18 (03-10-23 @ 21:57) (18 - 18)  SpO2: 94% (03-10-23 @ 21:57) (94% - 98%)    PHYSICAL EXAM:    NECK: Supple, No JVD  CHEST/LUNG: Clear to auscultation bilaterally; No wheezing.  HEART: Regular rate and rhythm; No murmurs, rubs, or gallops  ABDOMEN: Soft, Nontender, Nondistended; Bowel sounds present  EXTREMITIES:   No edema  NEUROLOGY: AA      LABS:                        9.4    13.44 )-----------( 242      ( 10 Mar 2023 05:36 )             30.1     03-10    137  |  104  |  38<H>  ----------------------------<  118<H>  4.5   |  22  |  0.89    Ca    8.6      10 Mar 2023 01:51  Phos  4.1     03-10  Mg     2.1     03-10              CAPILLARY BLOOD GLUCOSE            RADIOLOGY & ADDITIONAL TESTS:    Imaging Personally Reviewed:    Consultant(s) Notes Reviewed:      Care Discussed with Consultants/Other Providers:    Francisco Cheng MD, CMD, FACP    [id number] [place] [address]  Office Tel: [phone]  Cell: [phone]

## 2023-03-11 NOTE — PROGRESS NOTE ADULT - SUBJECTIVE AND OBJECTIVE BOX
CARDIOLOGY FOLLOW UP NOTE - DR. MARX    Patient Name: SHEY HINOJOSA  Date of Service: 03-11-23 @ 12:42    Patient seen and examined  no new complaints      Subjective:    cv: denies chest pain, dyspnea, palpitations, dizziness  pulmonary: denies cough  GI: denies abdominal pain, nausea, vomiting  vascular/legs: no edema   skin: no rash  ROS: otherwise negative   overnight events:      PHYSICAL EXAM:  T(C): 36.7 (03-11-23 @ 11:11), Max: 37 (03-10-23 @ 21:57)  HR: 87 (03-11-23 @ 11:11) (87 - 94)  BP: 109/67 (03-11-23 @ 11:11) (109/67 - 112/66)  RR: 18 (03-11-23 @ 11:11) (18 - 18)  SpO2: 97% (03-11-23 @ 11:11) (94% - 97%)  Wt(kg): --  I&O's Summary    10 Mar 2023 07:01  -  11 Mar 2023 07:00  --------------------------------------------------------  IN: 800 mL / OUT: 450 mL / NET: 350 mL      Daily     Daily     Appearance: Normal	  Cardiovascular: Normal S1 S2,RRR, No JVD, No murmurs  Respiratory: Lungs clear to auscultation	  Gastrointestinal:  Soft, Non-tender, + BS	  Extremities: Normal range of motion, No clubbing, cyanosis or edema      Home Medications:  acetaminophen 325 mg oral tablet: 3 tab(s) orally every 6 hours (07 Mar 2023 11:48)  apixaban 5 mg oral tablet: 1 tab(s) orally 2 times a day (07 Mar 2023 11:48)  metoprolol succinate 25 mg oral tablet, extended release: 1 tab(s) orally once a day (at bedtime) (24 Feb 2023 17:17)  Narcan 4 mg/0.1 mL nasal spray: nasal once a day, As Needed -3 minutes if no or minimal response.  . Naloxone spray 4 mg/0.1 ml intranasal, Spray 0.1 mL into one nostril. Repeat with second device into other nostril after 2-3 minutes if no or minimal response     (07 Mar 2023 11:56)  oxyCODONE 5 mg oral tablet: 0.5 tab(s) orally every 6 hours, As Needed (07 Mar 2023 11:56)  polyethylene glycol 3350 oral powder for reconstitution: 17 gram(s) orally once a day (07 Mar 2023 11:48)  rosuvastatin 40 mg oral tablet: 1 tab(s) orally once a day (at bedtime) (07 Mar 2023 11:48)      MEDICATIONS  (STANDING):  acetaminophen     Tablet .. 975 milliGRAM(s) Oral every 6 hours  apixaban 5 milliGRAM(s) Oral two times a day  chlorhexidine 2% Cloths 1 Application(s) Topical daily  dextrose 5% + sodium chloride 0.9%. 1000 milliLiter(s) (60 mL/Hr) IV Continuous <Continuous>  metoprolol succinate ER 25 milliGRAM(s) Oral daily  multivitamin 1 Tablet(s) Oral daily  polyethylene glycol 3350 17 Gram(s) Oral daily  rosuvastatin 40 milliGRAM(s) Oral at bedtime  senna 2 Tablet(s) Oral at bedtime      TELEMETRY: 	    ECG:  	  RADIOLOGY:   DIAGNOSTIC TESTING:  [ ] Echocardiogram:  [ ] Catheterization:  [ ] Stress Test:    OTHER: 	    LABS:	 	    CARDIAC MARKERS:                                      9.8    13.31 )-----------( 247      ( 11 Mar 2023 06:09 )             30.9     03-11    138  |  107  |  27<H>  ----------------------------<  233<H>  4.0   |  23  |  0.73    Ca    8.1<L>      11 Mar 2023 06:09  Phos  4.1     03-10  Mg     2.1     03-10      proBNP:     Lipid Profile:   HgA1c:     Creatinine, Serum: 0.73 mg/dL (03-11-23 @ 06:09)  Creatinine, Serum: 0.89 mg/dL (03-10-23 @ 01:51)  Creatinine, Serum: 0.91 mg/dL (03-09-23 @ 06:18)

## 2023-03-12 NOTE — CHART NOTE - NSCHARTNOTEFT_GEN_A_CORE
Patient triggered for nutrition consult for 3-Day calorie count (3/11-3/13).Calorie Count sheet observed in patient's assigned room, nursing documentation has been initiated.  Observed pt eating breakfast (yogurt) on 3/12/23. RD to assess and follow up upon completion.    Shahana Antoine MS,RDN,CDN Pager #[phone] or TEAMS

## 2023-03-12 NOTE — PROGRESS NOTE ADULT - ASSESSMENT
Echo 4/6/21: Nml LV fxn EF 61%  Echo 2/27/23: Mild anteroseptal hypokinesis EF 45-50%, Mild diastolic dysfxn, Patent foramen ovale    A/P  89 year old female from home AAO x3, with PMH of Afib on xarelto, CAD, HTN, HLD and history of right ankle fracture s/p repair 9 months ago, who presented to the ED s/p fall at home, found to have b/l pubic rami fx with hematoma and acute infarct on MRI.    #Acute CVA  -Likely cause of fall given reported hx and no cardiac prodrome sx  -Neuro following  -Continue atorvastatin  -Prior echo with nml lv fxn  -Echo with PFO, anteroseptal hypokinesis  -LE duplex negative for DVT  -F/u neurology  -Likely xarelto failure  -Continue apixaban    #Afib  -NSR on tele  -Continue metoprolol 25mg qd  -Continue apixaban  -Echo results as above    #CAD  -Continue atorvastatin    #HTN  -Continue metoprolol  -May continue to hold ace/arb for hypotension    #Pubic rami fx w/ hematoma  -Per ortho no surgical intervention  -Monitor h/h while on apixaban    #Leukocytosis  -ct chest noted  -Abx per primary, id   -id f/u    #ct chest with b/l effusions post ivf  -d/c ivf  -no clinical HF  -lasix as needed if dyspnea, orthopnea, worsening hypoxia        35 minutes spent on total encounter; more than 50% of the visit was spent counseling and/or coordinating care by the attending physician.

## 2023-03-12 NOTE — PROGRESS NOTE ADULT - SUBJECTIVE AND OBJECTIVE BOX
CARDIOLOGY FOLLOW UP NOTE - DR. MARX    Patient Name: SHEY HINOJOSA  Date of Service: 03-12-23 @ 12:14    Patient seen and examined    Subjective:    cv: denies chest pain, dyspnea, palpitations, dizziness  pulmonary: denies cough  GI: denies abdominal pain, nausea, vomiting  vascular/legs: no edema   skin: no rash  ROS: otherwise negative   overnight events:      PHYSICAL EXAM:  T(C): 36.8 (03-12-23 @ 11:11), Max: 36.8 (03-12-23 @ 06:20)  HR: 99 (03-12-23 @ 11:11) (89 - 99)  BP: 112/63 (03-12-23 @ 11:11) (106/74 - 112/63)  RR: 18 (03-12-23 @ 11:11) (18 - 20)  SpO2: 94% (03-12-23 @ 11:11) (94% - 96%)  Wt(kg): --  I&O's Summary    11 Mar 2023 06:01  -  12 Mar 2023 07:00  --------------------------------------------------------  IN: 1080 mL / OUT: 300 mL / NET: 780 mL      Daily     Daily     Appearance: Normal	  Cardiovascular: Normal S1 S2,RRR, No JVD, No murmurs  Respiratory: Lungs clear to auscultation	  Gastrointestinal:  Soft, Non-tender, + BS	  Extremities: Normal range of motion, No clubbing, cyanosis or edema      Home Medications:  acetaminophen 325 mg oral tablet: 3 tab(s) orally every 6 hours (07 Mar 2023 11:48)  apixaban 5 mg oral tablet: 1 tab(s) orally 2 times a day (07 Mar 2023 11:48)  metoprolol succinate 25 mg oral tablet, extended release: 1 tab(s) orally once a day (at bedtime) (24 Feb 2023 17:17)  Narcan 4 mg/0.1 mL nasal spray: nasal once a day, As Needed -3 minutes if no or minimal response.  . Naloxone spray 4 mg/0.1 ml intranasal, Spray 0.1 mL into one nostril. Repeat with second device into other nostril after 2-3 minutes if no or minimal response     (07 Mar 2023 11:56)  oxyCODONE 5 mg oral tablet: 0.5 tab(s) orally every 6 hours, As Needed (07 Mar 2023 11:56)  polyethylene glycol 3350 oral powder for reconstitution: 17 gram(s) orally once a day (07 Mar 2023 11:48)  rosuvastatin 40 mg oral tablet: 1 tab(s) orally once a day (at bedtime) (07 Mar 2023 11:48)      MEDICATIONS  (STANDING):  acetaminophen     Tablet .. 975 milliGRAM(s) Oral every 6 hours  apixaban 5 milliGRAM(s) Oral two times a day  chlorhexidine 2% Cloths 1 Application(s) Topical daily  metoprolol succinate ER 25 milliGRAM(s) Oral daily  multivitamin 1 Tablet(s) Oral daily  polyethylene glycol 3350 17 Gram(s) Oral daily  rosuvastatin 40 milliGRAM(s) Oral at bedtime  senna 2 Tablet(s) Oral at bedtime      TELEMETRY: 	    ECG:  	  RADIOLOGY:   DIAGNOSTIC TESTING:  [ ] Echocardiogram:  [ ] Catheterization:  [ ] Stress Test:    OTHER: 	    LABS:	 	    CARDIAC MARKERS:                                      11.1   17.20 )-----------( 262      ( 12 Mar 2023 10:18 )             35.7     03-12    137  |  106  |  22  ----------------------------<  114<H>  4.4   |  21<L>  |  0.71    Ca    8.9      12 Mar 2023 10:18    TPro  4.9<L>  /  Alb  2.3<L>  /  TBili  0.2  /  DBili  x   /  AST  32  /  ALT  35  /  AlkPhos  339<H>  03-12    proBNP:     Lipid Profile:   HgA1c:     Creatinine, Serum: 0.71 mg/dL (03-12-23 @ 10:18)  Creatinine, Serum: 0.62 mg/dL (03-12-23 @ 06:25)  Creatinine, Serum: 0.73 mg/dL (03-11-23 @ 06:09)  Creatinine, Serum: 0.89 mg/dL (03-10-23 @ 01:51)

## 2023-03-12 NOTE — PROGRESS NOTE ADULT - ASSESSMENT
89 year old female from home AAO x3, with PMH of Afib on xarelto, CAD, HTN, HLD and history of right ankle fracture s/p repair 9 months ago, who presented to the ED s/p fall at home.     WBC of 15K on admission. Xray noted to show acute fractures of bilateral pubic rami. CT pelvis done showing acute fracture of superior and inferior pubic rami and right sacral ala. Hematoma in right lower pelvis. CXR reviewed; appears clear. Code stroke called in ED due to facial droop and right UE weakness. CT head, CTA brain and neck done with no acute abnormalities.       Acute  CVA , confirmed w MR 2/25, RUE hemiparesis, dysarthria and expressive aphasia  TTE w PFO, cardiology following  ptn was on chronic AC w Xarelto, however it appears to have been  a therapeutic failure, changed to Eliquis 5 mg bid. Neuro in agreement. cardiology in agreement  PT f/up  PMR consult done, awaiting AR  MBS done, on dysphagia diet  Poor po intake- IVF  Leukocytosis: CT chest 3/10  c/w CHF  ID following, observe off ABx  CT abd and Pelvis pending

## 2023-03-12 NOTE — PROGRESS NOTE ADULT - SUBJECTIVE AND OBJECTIVE BOX
Patient is a 89y old  Female who presents with a chief complaint of fall; pubic rami fracture (12 Mar 2023 12:13)      SUBJECTIVE / OVERNIGHT EVENTS: no new events    MEDICATIONS  (STANDING):  acetaminophen     Tablet .. 975 milliGRAM(s) Oral every 6 hours  apixaban 5 milliGRAM(s) Oral two times a day  chlorhexidine 2% Cloths 1 Application(s) Topical daily  metoprolol succinate ER 25 milliGRAM(s) Oral daily  multivitamin 1 Tablet(s) Oral daily  polyethylene glycol 3350 17 Gram(s) Oral daily  rosuvastatin 40 milliGRAM(s) Oral at bedtime  senna 2 Tablet(s) Oral at bedtime    MEDICATIONS  (PRN):  oxyCODONE    IR 2.5 milliGRAM(s) Oral every 4 hours PRN Moderate Pain (4 - 6)      Vital Signs Last 24 Hrs  T(F): 98.3 (03-12-23 @ 11:11), Max: 98.3 (03-12-23 @ 06:20)  HR: 99 (03-12-23 @ 11:11) (89 - 99)  BP: 112/63 (03-12-23 @ 11:11) (106/74 - 112/63)  RR: 18 (03-12-23 @ 11:11) (18 - 20)  SpO2: 94% (03-12-23 @ 11:11) (94% - 96%)  Telemetry:   CAPILLARY BLOOD GLUCOSE        I&O's Summary    11 Mar 2023 06:01  -  12 Mar 2023 07:00  --------------------------------------------------------  IN: 1080 mL / OUT: 300 mL / NET: 780 mL    12 Mar 2023 07:01  -  12 Mar 2023 21:43  --------------------------------------------------------  IN: 180 mL / OUT: 200 mL / NET: -20 mL        PHYSICAL EXAM:  GENERAL: NAD, well-developed  HEAD:  Atraumatic, Normocephalic  EYES: EOMI, PERRLA, conjunctiva and sclera clear  NECK: Supple, No JVD  CHEST/LUNG: Clear to auscultation bilaterally; No wheeze  HEART: Regular rate and rhythm; No murmurs, rubs, or gallops  ABDOMEN: Soft, Nontender, Nondistended; Bowel sounds present  EXTREMITIES:  2+ Peripheral Pulses, No clubbing, cyanosis, or edema  PSYCH: AAOx3  NEUROLOGY: non-focal  SKIN: No rashes or lesions    LABS:                        11.1   17.20 )-----------( 262      ( 12 Mar 2023 10:18 )             35.7     03-12    137  |  106  |  22  ----------------------------<  114<H>  4.4   |  21<L>  |  0.71    Ca    8.9      12 Mar 2023 10:18    TPro  4.9<L>  /  Alb  2.3<L>  /  TBili  0.2  /  DBili  x   /  AST  32  /  ALT  35  /  AlkPhos  339<H>  03-12              RADIOLOGY & ADDITIONAL TESTS:    Imaging Personally Reviewed:    Consultant(s) Notes Reviewed:      Care Discussed with Consultants/Other Providers:

## 2023-03-13 NOTE — PROGRESS NOTE ADULT - ASSESSMENT
89 year old female from home AAO x3, with PMH of Afib on xarelto, CAD, HTN, HLD and history of right ankle fracture s/p repair 9 months ago, who presented to the ED s/p fall at home.     WBC of 15K on admission. Xray noted to show acute fractures of bilateral pubic rami. CT pelvis done showing acute fracture of superior and inferior pubic rami and right sacral ala. Hematoma in right lower pelvis. CXR reviewed; appears clear. Code stroke called in ED due to facial droop and right UE weakness. CT head, CTA brain and neck done with no acute abnormalities.       Acute  CVA , confirmed w MR 2/25, RUE hemiparesis, dysarthria and expressive aphasia  TTE w PFO, cardiology following  ptn was on chronic AC w Xarelto, however it appears to have been  a therapeutic failure, changed to Eliquis 5 mg bid. Neuro in agreement. cardiology in agreement  PT f/up  PMR consult done, awaiting AR  MBS done, on dysphagia diet  Poor po intake- IVF  Leukocytosis: CT chest 3/10  c/w CHF  ID following, observe off ABx  CT abd and Pelvis pending  GI lynnette; called for FTT  IVF  Dw pt's son who is a physician

## 2023-03-13 NOTE — CONSULT NOTE ADULT - SUBJECTIVE AND OBJECTIVE BOX
Chief Complaint:  Patient is a 89y old  Female who presents with a chief complaint of fall; pubic rami fracture (13 Mar 2023 10:43)      Date of service: 03-13-23 @ 14:09    HPI:    The patient is an 89 year old female from home AAO x 3, with PMH of Afib on xarelto, CAD, HTN, HLD and history of right ankle fracture s/p repair 9 months ago, who presented to the ED s/p fall, found to have b/l pubic rami fx with hematoma and acute infarct on MRI.      The patient denies dysphagia, nausea and vomiting, abdominal pain, diarrhea, unintentional weight loss, change in bowel habits or NSAID use.      Allergies:  No Known Allergies      Home Medications:    Hospital Medications:  acetaminophen     Tablet .. 975 milliGRAM(s) Oral every 6 hours  apixaban 5 milliGRAM(s) Oral two times a day  chlorhexidine 2% Cloths 1 Application(s) Topical daily  melatonin 3 milliGRAM(s) Oral once  metoprolol succinate ER 25 milliGRAM(s) Oral daily  multivitamin 1 Tablet(s) Oral daily  oxyCODONE    IR 2.5 milliGRAM(s) Oral every 4 hours PRN  polyethylene glycol 3350 17 Gram(s) Oral daily  rosuvastatin 40 milliGRAM(s) Oral at bedtime  senna 2 Tablet(s) Oral at bedtime      PMHX/PSHX:  HTN (hypertension)    Hyperlipidemia    CAD (coronary artery disease)    MVP (mitral valve prolapse)    HTN (hypertension)    Chronic atrial fibrillation    No significant past surgical history    History of ankle surgery        Family history:  Family history of CVA (Mother)        Social History:   Denies ethanol use.  Denies illicit drug use.    ROS:     General:  No wt loss, fevers, chills, night sweats, fatigue,   Eyes:  Good vision, no reported pain  ENT:  No sore throat, pain, runny nose, dysphagia  CV:  No pain, palpitations, hypo/hypertension  Resp:  No dyspnea, cough, tachypnea, wheezing  GI:  See HPI  :  No pain, bleeding, incontinence, nocturia  Muscle:  No pain, weakness  Neuro:  No weakness, tingling, memory problems  Psych:  No fatigue, insomnia, mood problems, depression  Endocrine:  No polyuria, polydipsia, cold/heat intolerance  Heme:  No petechiae, ecchymosis, easy bruisability  Integumentary:  No rash, edema      PHYSICAL EXAM:     GENERAL:  Appears stated age, well-groomed, well-nourished, no distress  HEENT:  NC/AT,  conjunctivae anicteric, clear and pink,   NECK: supple, trachea midline  CHEST:  Full & symmetric excursion, no increased effort, breath sounds clear  HEART:  Regular rhythm, no JVD  ABDOMEN:  Soft, non-tender, non-distended, normoactive bowel sounds,  no masses , no hepatosplenomegaly  EXTREMITIES:  no cyanosis,clubbing or edema  SKIN:  No rash, erythema, or, ecchymoses, no jaundice  NEURO:  Alert, non-focal, no asterixis  PSYCH: Appropriate affect, oriented to place and time  RECTAL: Deferred      Vital Signs:  Vital Signs Last 24 Hrs  T(C): 36.7 (13 Mar 2023 12:30), Max: 36.9 (13 Mar 2023 06:19)  T(F): 98 (13 Mar 2023 12:30), Max: 98.4 (13 Mar 2023 06:19)  HR: 91 (13 Mar 2023 12:30) (91 - 118)  BP: 113/64 (13 Mar 2023 12:30) (102/61 - 113/71)  BP(mean): --  RR: 18 (13 Mar 2023 12:30) (16 - 20)  SpO2: 94% (13 Mar 2023 12:30) (92% - 99%)    Parameters below as of 13 Mar 2023 12:30  Patient On (Oxygen Delivery Method): room air      Daily     Daily     LABS: Labs personally reviewed by me:                        11.4   18.48 )-----------( 272      ( 13 Mar 2023 05:59 )             34.4     03-13    137  |  105  |  24<H>  ----------------------------<  119<H>  4.3   |  19<L>  |  0.75    Ca    8.9      13 Mar 2023 06:00  Phos  3.2     03-13  Mg     2.0     03-13    TPro  4.9<L>  /  Alb  2.3<L>  /  TBili  0.2  /  DBili  x   /  AST  32  /  ALT  35  /  AlkPhos  339<H>  03-12    LIVER FUNCTIONS - ( 12 Mar 2023 06:25 )  Alb: 2.3 g/dL / Pro: 4.9 g/dL / ALK PHOS: 339 U/L / ALT: 35 U/L / AST: 32 U/L / GGT: x                   Imaging personally reviewed by me:           Chief Complaint:  Patient is a 89y old  Female who presents with a chief complaint of fall; pubic rami fracture (13 Mar 2023 10:43)      Date of service: 03-13-23 @ 14:09    HPI:    The patient is an 89 year old female from home AAO x 3, with PMH of Afib on xarelto, CAD, HTN, HLD and history of right ankle fracture s/p repair 9 months ago, who presented to the ED s/p fall, found to have b/l pubic rami fx with hematoma and acute infarct on MRI. Post stroke pt is having oropharyngeal dysphagia. She is on a dysphagia diet but with minimal PO intake. She attributes it to not liking the food options. No signs of esophageal dysphagia or odynophagia. No other focal GI symptoms. She was previously constipated, since resolved.     Allergies:  No Known Allergies      Home Medications:    Hospital Medications:  acetaminophen     Tablet .. 975 milliGRAM(s) Oral every 6 hours  apixaban 5 milliGRAM(s) Oral two times a day  chlorhexidine 2% Cloths 1 Application(s) Topical daily  melatonin 3 milliGRAM(s) Oral once  metoprolol succinate ER 25 milliGRAM(s) Oral daily  multivitamin 1 Tablet(s) Oral daily  oxyCODONE    IR 2.5 milliGRAM(s) Oral every 4 hours PRN  polyethylene glycol 3350 17 Gram(s) Oral daily  rosuvastatin 40 milliGRAM(s) Oral at bedtime  senna 2 Tablet(s) Oral at bedtime      PMHX/PSHX:  HTN (hypertension)    Hyperlipidemia    CAD (coronary artery disease)    MVP (mitral valve prolapse)    HTN (hypertension)    Chronic atrial fibrillation    No significant past surgical history    History of ankle surgery        Family history:  Family history of CVA (Mother)        Social History:   Denies ethanol use.  Denies illicit drug use.    ROS:     General:  No wt loss, fevers, chills, night sweats, fatigue,   Eyes:  Good vision, no reported pain  ENT:  No sore throat, pain, runny nose, dysphagia  CV:  No pain, palpitations, hypo/hypertension  Resp:  No dyspnea, cough, tachypnea, wheezing  GI:  See HPI  :  No pain, bleeding, incontinence, nocturia  Muscle:  No pain, weakness  Neuro:  No weakness, tingling, memory problems  Psych:  No fatigue, insomnia, mood problems, depression  Endocrine:  No polyuria, polydipsia, cold/heat intolerance  Heme:  No petechiae, ecchymosis, easy bruisability  Integumentary:  No rash, edema      PHYSICAL EXAM:     GENERAL:  Appears stated age, well-groomed, well-nourished, no distress  HEENT:  NC/AT,  conjunctivae anicteric, clear and pink,   NECK: supple, trachea midline  CHEST:  Full & symmetric excursion, no increased effort, breath sounds clear  HEART:  Regular rhythm, no JVD  ABDOMEN:  Soft, non-tender, non-distended, normoactive bowel sounds,  no masses , no hepatosplenomegaly  EXTREMITIES:  no cyanosis,clubbing or edema  SKIN:  No rash, erythema, or, ecchymoses, no jaundice  NEURO:  Alert, non-focal, no asterixis  PSYCH: Appropriate affect, oriented to place and time  RECTAL: Deferred      Vital Signs:  Vital Signs Last 24 Hrs  T(C): 36.7 (13 Mar 2023 12:30), Max: 36.9 (13 Mar 2023 06:19)  T(F): 98 (13 Mar 2023 12:30), Max: 98.4 (13 Mar 2023 06:19)  HR: 91 (13 Mar 2023 12:30) (91 - 118)  BP: 113/64 (13 Mar 2023 12:30) (102/61 - 113/71)  BP(mean): --  RR: 18 (13 Mar 2023 12:30) (16 - 20)  SpO2: 94% (13 Mar 2023 12:30) (92% - 99%)    Parameters below as of 13 Mar 2023 12:30  Patient On (Oxygen Delivery Method): room air      Daily     Daily     LABS: Labs personally reviewed by me:                        11.4   18.48 )-----------( 272      ( 13 Mar 2023 05:59 )             34.4     03-13    137  |  105  |  24<H>  ----------------------------<  119<H>  4.3   |  19<L>  |  0.75    Ca    8.9      13 Mar 2023 06:00  Phos  3.2     03-13  Mg     2.0     03-13    TPro  4.9<L>  /  Alb  2.3<L>  /  TBili  0.2  /  DBili  x   /  AST  32  /  ALT  35  /  AlkPhos  339<H>  03-12    LIVER FUNCTIONS - ( 12 Mar 2023 06:25 )  Alb: 2.3 g/dL / Pro: 4.9 g/dL / ALK PHOS: 339 U/L / ALT: 35 U/L / AST: 32 U/L / GGT: x                   Imaging personally reviewed by me:

## 2023-03-13 NOTE — PROGRESS NOTE ADULT - SUBJECTIVE AND OBJECTIVE BOX
89yPatient is a 89y old  Female who presents with a chief complaint of fall; pubic rami fracture (13 Mar 2023 14:08)      Interval history:  Afebrile, no new symptoms.       Allergies:   No Known Allergies      Antimicrobials:      REVIEW OF SYSTEMS:  No chest pain   No SOB  No abdominal pain  No dysuria   No rash.       Vital Signs Last 24 Hrs  T(C): 36.7 (03-13-23 @ 21:05), Max: 36.9 (03-13-23 @ 06:19)  T(F): 98 (03-13-23 @ 21:05), Max: 98.4 (03-13-23 @ 06:19)  HR: 95 (03-13-23 @ 21:05) (91 - 118)  BP: 109/71 (03-13-23 @ 21:05) (102/61 - 113/71)  BP(mean): --  RR: 18 (03-13-23 @ 21:05) (18 - 20)  SpO2: 94% (03-13-23 @ 21:05) (92% - 99%)      PHYSICAL EXAM:  Patient in no acute distress. Alert, awake.   Cardiovascular: S1S2 normal.  Lungs: + air entry B/L lung fields.  Gastrointestinal: soft, nontender, nondistended.  IV sites not inflamed.                               11.4   18.48 )-----------( 272      ( 13 Mar 2023 05:59 )             34.4   03-13    137  |  105  |  24<H>  ----------------------------<  119<H>  4.3   |  19<L>  |  0.75    Ca    8.9      13 Mar 2023 06:00  Phos  3.2     03-13  Mg     2.0     03-13    TPro  4.9<L>  /  Alb  2.3<L>  /  TBili  0.2  /  DBili  x   /  AST  32  /  ALT  35  /  AlkPhos  339<H>  03-12      LIVER FUNCTIONS - ( 12 Mar 2023 06:25 )  Alb: 2.3 g/dL / Pro: 4.9 g/dL / ALK PHOS: 339 U/L / ALT: 35 U/L / AST: 32 U/L / GGT: x               Radiology:  < from: Xray Abdomen 1 View PORTABLE -Routine (Xray Abdomen 1 View PORTABLE -Routine .) (03.13.23 @ 14:44) >  IMPRESSION:  Nonobstructive bowel gas pattern.  Contrast is seen opacifying several loops of bowel.      < from: CT Chest No Cont (03.10.23 @ 17:22) >  IMPRESSION:  Small bilateral pleural effusions with bilateral lower lobe passive   atelectasis, suggesting volume overload

## 2023-03-13 NOTE — CONSULT NOTE ADULT - ASSESSMENT
1. Stroke, Afib  - on Eliquis   - per neurology     2. Failure to thrive    3. Abdominal pain   - pending CT A/P, will follow     4. HTN  -Continue metoprolol  -May continue to hold ace/arb for hypotension    5. Pubic rami fx w/ hematoma  - Per ortho no surgical intervention  - Monitor h/h while on apixaban    6. Leukocytosis  - per ID       I had a prolonged conversation with the patient regarding the hospital course, differential diagnosis, results of diagnostic tests this far, and therapeutic modalities available. Plan of care discussed with the patient after the evaluation. Patient expresses a clear understanding of the plan of care. Sixty five minutes spent on the total encounter, of which more than fifty percent of the encounter was spent on counseling and/or coordinating care by the attending physician.    Advanced care planning forms were discussed. Code status including forceful chest compressions, defibrillation and intubation were discussed. The risks benefits and alternatives to pertinent gastrointestinal procedures and interventions were discussed in detail and all questions were answered. Duration: 15 Minutes.    Amina Ortiz M.D.   Gastroenterology and Hepatology  [address] [place], [place]  Office: [phone]  Cell: [phone] 1. Stroke, Afib  - on Eliquis   - per neurology     2. dysphagia, failure to thrive  - S&S recs appreciated  - the pt has no signs of esophageal dysphagia. Agree with calorie count and close nutrition follow up. Options discussed with the patient and her family; they will try conservative measures for now and if all else fails and she is unable to meet nutritional requirements, can consider PEG.     3. HTN  -Continue metoprolol  -May continue to hold ace/arb for hypotension    4. Pubic rami fx w/ hematoma  - Per ortho no surgical intervention  - Monitor h/h while on apixaban    5. Leukocytosis  - per ID       I had a prolonged conversation with the patient regarding the hospital course, differential diagnosis, results of diagnostic tests this far, and therapeutic modalities available. Plan of care discussed with the patient after the evaluation. Patient expresses a clear understanding of the plan of care. Sixty five minutes spent on the total encounter, of which more than fifty percent of the encounter was spent on counseling and/or coordinating care by the attending physician.    Advanced care planning forms were discussed. Code status including forceful chest compressions, defibrillation and intubation were discussed. The risks benefits and alternatives to pertinent gastrointestinal procedures and interventions were discussed in detail and all questions were answered. Duration: 15 Minutes.    Amina Ortiz M.D.   Gastroenterology and Hepatology  [address] [place], [place]  Office: [phone]  Cell: [phone]

## 2023-03-13 NOTE — CHART NOTE - NSCHARTNOTEFT_GEN_A_CORE
Medicine NP Note    CC: Patient converted from SR back to afib, Hx of AF  Tele reviewed, Af on tele with Hr in 90's to 110's, Occassionally to 120's  Pt asymptomatic    Vital Signs Last 24 Hrs  T(C): 36.7 (13 Mar 2023 03:04), Max: 36.8 (12 Mar 2023 06:20)  T(F): 98.1 (13 Mar 2023 03:04), Max: 98.3 (12 Mar 2023 06:20)  HR: 115 (13 Mar 2023 03:04) (89 - 115)  BP: 113/71 (13 Mar 2023 03:04) (106/74 - 113/71)  BP(mean): --  RR: 18 (13 Mar 2023 03:04) (16 - 20)  SpO2: 92% (13 Mar 2023 03:04) (92% - 96%)    Parameters below as of 13 Mar 2023 03:04  Patient On (Oxygen Delivery Method): room air                            11.4   18.48 )-----------( 272      ( 13 Mar 2023 05:59 )             34.4     A/P    89 year old female from home AAO x3, with PMH of Afib on xarelto, CAD, HTN, HLD and history of right ankle fracture s/p repair 9 months ago, who presented to the ED s/p fall at home, found to have b/l pubic rami fx with hematoma and acute infarct on MRI.  Now with afib. tachy  Pt afebrile  Appears non toxic  Administer am metoprolol now  Electrolytes stat ordered  Tyelnol 650mg po for mild pain  Will continue to monitor  Will sign out to day team    Sal Davison P BC  [id number]

## 2023-03-13 NOTE — PROGRESS NOTE ADULT - ASSESSMENT
Echo 4/6/21: Nml LV fxn EF 61%  Echo 2/27/23: Mild anteroseptal hypokinesis EF 45-50%, Mild diastolic dysfxn, Patent foramen ovale    A/P  89 year old female from home AAO x3, with PMH of Afib on xarelto, CAD, HTN, HLD and history of right ankle fracture s/p repair 9 months ago, who presented to the ED s/p fall at home, found to have b/l pubic rami fx with hematoma and acute infarct on MRI.    #Acute CVA  -Likely cause of fall given reported hx and no cardiac prodrome sx  -Neuro following  -Continue atorvastatin  -Prior echo with nml lv fxn  -Echo with PFO, anteroseptal hypokinesis  -LE duplex negative for DVT  -F/u neurology  -Likely xarelto failure  -Continue apixaban    #Afib  -SR on tele with pAFlutter up to 120s  -Consider increasing metoprolol to 50mg qd  -Continue apixaban  -Echo results as above    #CAD  -Continue atorvastatin    #HTN  -Continue metoprolol  -May continue to hold ace/arb for hypotension    #Pubic rami fx w/ hematoma  -Per ortho no surgical intervention  -Monitor h/h while on apixaban    #Leukocytosis  -ct chest noted  -Abx per primary, id   -id f/u    #ct chest with b/l effusions post ivf  -d/c ivf  -no clinical HF  -lasix as needed if dyspnea, orthopnea, worsening hypoxia      Please call/text me with questions/concerns between 8am-4pm  [phone]

## 2023-03-13 NOTE — PROGRESS NOTE ADULT - SUBJECTIVE AND OBJECTIVE BOX
Patient is a 89y old  Female who presents with a chief complaint of fall; pubic rami fracture (13 Mar 2023 19:49)      SUBJECTIVE / OVERNIGHT EVENTS:    Events noted.  CONSTITUTIONAL: Poor po intake  RESPIRATORY: No cough, wheezing,  No shortness of breath  CARDIOVASCULAR: No chest pain, palpitations, dizziness, or leg swelling  GASTROINTESTINAL: No abdominal or epigastric pain. No nausea, vomiting.      MEDICATIONS  (STANDING):  acetaminophen     Tablet .. 975 milliGRAM(s) Oral every 6 hours  apixaban 5 milliGRAM(s) Oral two times a day  chlorhexidine 2% Cloths 1 Application(s) Topical daily  dextrose 5% + sodium chloride 0.45%. 1000 milliLiter(s) (60 mL/Hr) IV Continuous <Continuous>  melatonin 3 milliGRAM(s) Oral once  metoprolol succinate ER 25 milliGRAM(s) Oral daily  multivitamin 1 Tablet(s) Oral daily  polyethylene glycol 3350 17 Gram(s) Oral daily  rosuvastatin 40 milliGRAM(s) Oral at bedtime  senna 2 Tablet(s) Oral at bedtime    MEDICATIONS  (PRN):  oxyCODONE    IR 2.5 milliGRAM(s) Oral every 4 hours PRN Moderate Pain (4 - 6)        CAPILLARY BLOOD GLUCOSE        I&O's Summary    12 Mar 2023 07:01  -  13 Mar 2023 07:00  --------------------------------------------------------  IN: 420 mL / OUT: 200 mL / NET: 220 mL    13 Mar 2023 07:01  -  14 Mar 2023 00:03  --------------------------------------------------------  IN: 240 mL / OUT: 350 mL / NET: -110 mL        T(C): 36.7 (03-13-23 @ 21:05), Max: 36.9 (03-13-23 @ 06:19)  HR: 95 (03-13-23 @ 21:05) (91 - 118)  BP: 109/71 (03-13-23 @ 21:05) (102/61 - 113/71)  RR: 18 (03-13-23 @ 21:05) (18 - 20)  SpO2: 94% (03-13-23 @ 21:05) (92% - 99%)    PHYSICAL EXAM:    NECK: Supple, No JVD  CHEST/LUNG: Clear to auscultation bilaterally; No wheezing.  HEART: Regular rate and rhythm; No murmurs, rubs, or gallops  ABDOMEN: Soft, Nontender, Nondistended; Bowel sounds present  EXTREMITIES:   No edema  NEUROLOGY: AAO      LABS:                        11.4   18.48 )-----------( 272      ( 13 Mar 2023 05:59 )             34.4     03-13    137  |  105  |  24<H>  ----------------------------<  119<H>  4.3   |  19<L>  |  0.75    Ca    8.9      13 Mar 2023 06:00  Phos  3.2     03-13  Mg     2.0     03-13    TPro  4.9<L>  /  Alb  2.3<L>  /  TBili  0.2  /  DBili  x   /  AST  32  /  ALT  35  /  AlkPhos  339<H>  03-12            CAPILLARY BLOOD GLUCOSE            RADIOLOGY & ADDITIONAL TESTS:    Imaging Personally Reviewed:    Consultant(s) Notes Reviewed:      Care Discussed with Consultants/Other Providers:    Francisco Cheng MD, CMD, FACP    [address] [place] [address]  Office Tel: [phone]  Cell: [phone]

## 2023-03-13 NOTE — CHART NOTE - NSCHARTNOTEFT_GEN_A_CORE
Nutrition Follow Up Note  Patient seen for: calorie count follow up   Chart reviewed, events noted.    "89 year old female from home AAO x3, with PMH of Afib on xarelto, CAD, HTN, HLD and history of right ankle fracture s/p repair 9 months ago, who presented to the ED s/p fall at home. "    Source: [x] Patient       [x] EMR        [x] RN        [x] Family at bedside- pt daughter at bedside, pt's son on phone       [] Other:    -If unable to interview patient: [] Trach/Vent/BiPAP  [] Disoriented/confused/inappropriate to interview    Diet Order:   Diet, Pureed:   Moderately Thick Liquids (MODTHICKLIQS) (03-09-23)    - Is current order appropriate/adequate? [] Yes  []  No:     - PO intake :   [] >75%  Adequate    [] 50-75%  Fair       [] <50%  Poor    - Nutrition-related concerns:  - calorie count completed:       GI:  Last BM ___.   Bowel Regimen? [] Yes   [] No  Pt denies nausea, vomiting, diarrhea, or constipation.     Weights:   Daily N/A  Drug Dosing Weight  Height (cm): 160 (24 Feb 2023 09:43)  Weight (kg): 54.9 (24 Feb 2023 09:43)  BMI (kg/m2): 21.4 (24 Feb 2023 09:43)  BSA (m2): 1.56 (24 Feb 2023 09:43)      Nutritionally Pertinent MEDICATIONS  (STANDING):  metoprolol succinate ER  multivitamin  polyethylene glycol 3350  rosuvastatin  senna    Pertinent Labs: 03-13 @ 06:00: Na 137, BUN 24<H>, Cr 0.75, <H>, K+ 4.3, Phos 3.2, Mg 2.0, Alk Phos --, ALT/SGPT --, AST/SGOT --, HbA1c --    A1C with Estimated Average Glucose Result: 5.2 % (02-25-23 @ 07:08)    Finger Sticks:      Skin per nursing documentation:   Edema:     Estimated Needs:   [] no change since previous assessment  [] recalculated:     Previous Nutrition Diagnosis:   Nutrition Diagnosis is: [] ongoing  [] resolved [] not applicable     New Nutrition Diagnosis: [] Not applicable    Nutrition Care Plan:  [] In Progress  [] Achieved  [] Not applicable    Nutrition Interventions:     Education Provided:       [] Yes:  [] No:        Recommendations:         [] Continue current diet order            [] Add oral nutrition supplement:     [] Discontinue current diet order. Recommend:      [] Add micronutrient supplementation:      [] Continue current micronutrient supplementation:      [] Other:     Monitoring and Evaluation:   Continue to monitor nutritional intake, tolerance to diet prescription, weights, labs, skin integrity      RD remains available upon request and will follow up per protocol Nutrition Follow Up Note  Patient seen for: calorie count follow up   Chart reviewed, events noted.    "89 year old female from home AAO x3, with PMH of Afib on xarelto, CAD, HTN, HLD and history of right ankle fracture s/p repair 9 months ago, who presented to the ED s/p fall at home. "    Source: [x] Patient       [x] EMR        [x] RN        [x] Family at bedside- pt daughter at bedside, pt's son on phone       [] Other:    -If unable to interview patient: [] Trach/Vent/BiPAP  [] Disoriented/confused/inappropriate to interview    Diet Order:   Diet, Pureed:   Moderately Thick Liquids (MODTHICKLIQS) (03-09-23)    - Is current order appropriate/adequate? [] Yes  []  No:     - PO intake :   [] >75%  Adequate    [] 50-75%  Fair       [] <50%  Poor  - calorie count completed, per report, pt consumed:   3/11: 1493 calories and 56 g/pro   3/12: 1165 calories, 55 g/pro   per 3/13 breakfast pt consumed: 630 calories and 36 g/pro  on average (3/11-12) pt consumed 1329 calories (81% max range of estimated calorie needs) and 55.5 g/pro (72% max range of estimated protein needs) per day.     - Nutrition-related concerns:  - calorie count completed; pt does not meet estimated calorie protein needs/day; per family, improved PO intake but pt dislikes fluid consistency (mod thick per last speech pathologist eval & recs)- discussed with NP to consider speech pathologist follow up and evaluate family wishes /GOC.   - pt expressed concern about pt's hydration status; RD deferred to team   - now diarrhea; per RN diarrhea, pt was constipated now had diarrhea with bowel regimen      GI:  Last BM this am, per pt; per RN diarrhea, pt was constipated now had diarrhea with bowel regimen   Bowel Regimen? [x] Yes- senna, miralax   [] No  Pt denies nausea, vomiting at this time     Weights:   Daily N/A  Drug Dosing Weight  Height (cm): 160 (24 Feb 2023 09:43)  Weight (kg): 54.9 (24 Feb 2023 09:43)  BMI (kg/m2): 21.4 (24 Feb 2023 09:43)  BSA (m2): 1.56 (24 Feb 2023 09:43)      Nutritionally Pertinent MEDICATIONS  (STANDING):  metoprolol succinate ER  multivitamin  polyethylene glycol 3350  rosuvastatin  senna    Pertinent Labs: 03-13 @ 06:00: Na 137, BUN 24<H>, Cr 0.75, <H>, K+ 4.3, Phos 3.2, Mg 2.0, Alk Phos --, ALT/SGPT --, AST/SGOT --, HbA1c --    A1C with Estimated Average Glucose Result: 5.2 % (02-25-23 @ 07:08)    Skin per nursing documentation: no noted pressure injuries as per flowsheets  Edema: +3 right knee as per flowsheets     Estimated Needs:   [x] no change since previous assessment  [] recalculated:   1078-8468 calories/day   66-77 g/pro/day     Previous Nutrition Diagnosis: acute moderate protein calorie malnutrition; Swallowing Difficulty   Nutrition Diagnosis is: [x] ongoing  [] resolved [] not applicable     New Nutrition Diagnosis: [x] Not applicable    Nutrition Care Plan:  [x] In Progress- addressed with diet order, PO encouragement and nutritional supplements   [] Achieved  [] Not applicable    Nutrition Interventions:     Education Provided:       [] Yes:  [] No: discussed oral nutrition supplements  Encouraged adequate PO intake for meeting nutritional needs, improving nutritional status and for preventing wt loss        Recommendations:      - Continue current diet order; Defer diet/fluid consistencies to medical team/SLP recommendations.   --- consider speech pathologist  follow up and evaluate family wishes /GOC (for fluid consistency, per family and pt complains of thickened fluids)  --- defer pt's hydration/fluid needs to team; family with concerns to pt's hydration   - continue oral nutrition supplement; [employer] standard 1.0 shake BID with alternating fruit   - continue daily Multivitamin, pending no medical contraindication   - Will continue to monitor PO intake, weight, labs, skin, GI status, diet.   - Nutrition care plan to remain consistent with pt goals of care  - RD remains available to review diet education and adjust diet recommendations as needed.     Monitoring and Evaluation:   Continue to monitor nutritional intake, tolerance to diet prescription, weights, labs, skin integrity    RD remains available upon request and will follow up per protocol  Francisca Pantoja RD CDN #[phone] or Teams (preferred) Nutrition Follow Up Note  Patient seen for: calorie count follow up   Chart reviewed, events noted.    "89 year old female from home AAO x3, with PMH of Afib on xarelto, CAD, HTN, HLD and history of right ankle fracture s/p repair 9 months ago, who presented to the ED s/p fall at home. "    Source: [x] Patient       [x] EMR        [x] RN        [x] Family at bedside- pt daughter at bedside, pt's son on phone       [] Other:    -If unable to interview patient: [] Trach/Vent/BiPAP  [] Disoriented/confused/inappropriate to interview    Diet Order:   Diet, Pureed:   Moderately Thick Liquids (MODTHICKLIQS) (03-09-23)    - Is current order appropriate/adequate? [] Yes  []  No:     - PO intake :   [] >75%  Adequate    [] 50-75%  Fair       [] <50%  Poor  - calorie count completed, per report, pt consumed:   3/11: 1493 calories and 56 g/pro   3/12: 1165 calories, 55 g/pro   per 3/13 breakfast pt consumed: 630 calories and 36 g/pro  on average (3/11-12) pt consumed 1329 calories (81% max range of estimated calorie needs) and 55.5 g/pro (72% max range of estimated protein needs) per day.     - Nutrition-related concerns:  - calorie count completed; pt does not meet estimated calorie protein needs/day; per family, improved PO intake but pt dislikes fluid consistency (mod thick per last speech pathologist eval & recs)- discussed with NP to consider speech pathologist follow up and evaluate family wishes /GOC.   - pt expressed concern about pt's hydration status; RD deferred to team   - now diarrhea; per RN diarrhea, pt was constipated now had diarrhea with bowel regimen      GI:  Last BM this am, per pt; per RN diarrhea, pt was constipated now had diarrhea with bowel regimen   Bowel Regimen? [x] Yes- senna, miralax   [] No  Pt denies nausea, vomiting at this time     Weights:   Daily N/A  Drug Dosing Weight  Height (cm): 160 (24 Feb 2023 09:43)  Weight (kg): 54.9 (24 Feb 2023 09:43)  BMI (kg/m2): 21.4 (24 Feb 2023 09:43)  BSA (m2): 1.56 (24 Feb 2023 09:43)      Nutritionally Pertinent MEDICATIONS  (STANDING):  metoprolol succinate ER  multivitamin  polyethylene glycol 3350  rosuvastatin  senna    Pertinent Labs: 03-13 @ 06:00: Na 137, BUN 24<H>, Cr 0.75, <H>, K+ 4.3, Phos 3.2, Mg 2.0, Alk Phos --, ALT/SGPT --, AST/SGOT --, HbA1c --    A1C with Estimated Average Glucose Result: 5.2 % (02-25-23 @ 07:08)    Skin per nursing documentation: no noted pressure injuries as per flowsheets  Edema: +3 right knee as per flowsheets     Estimated Needs:   [x] no change since previous assessment  [] recalculated:   7210-1311 calories/day   66-77 g/pro/day     Previous Nutrition Diagnosis: acute moderate protein calorie malnutrition; Swallowing Difficulty   Nutrition Diagnosis is: [x] ongoing  [] resolved [] not applicable     New Nutrition Diagnosis: [x] Not applicable    Nutrition Care Plan:  [x] In Progress- addressed with diet order, PO encouragement and nutritional supplements   [] Achieved  [] Not applicable    Nutrition Interventions:     Education Provided:       [] Yes:  [] No: discussed oral nutrition supplements  Encouraged adequate PO intake for meeting nutritional needs, improving nutritional status and for preventing wt loss        Recommendations:      - Continue current diet order; Defer diet/fluid consistencies to medical team/SLP recommendations.   --- calorie count assessed, pt consuming ~70-80% estimated calorie protein needs; continue to encourage PO intake   --- consider speech pathologist  follow up and evaluate family wishes /GOC (for fluid consistency, per family and pt complains of thickened fluids)  --- defer pt's hydration/fluid needs to team; family with concerns to pt's hydration   - continue oral nutrition supplement; Dot standard 1.0 shake BID with alternating fruit   - continue daily Multivitamin, pending no medical contraindication   - Will continue to monitor PO intake, weight, labs, skin, GI status, diet.   - Nutrition care plan to remain consistent with pt goals of care  - RD remains available to review diet education and adjust diet recommendations as needed.     Monitoring and Evaluation:   Continue to monitor nutritional intake, tolerance to diet prescription, weights, labs, skin integrity    RD remains available upon request and will follow up per protocol  Francisca Pantoja RD CDN #[phone] or Teams (preferred)

## 2023-03-13 NOTE — PROGRESS NOTE ADULT - ASSESSMENT
89 year old female from home AAO x3, with PMH of Afib on xarelto, CAD, HTN, HLD and history of right ankle fracture s/p repair 9 months ago, who presented to the ED s/p fall at home on 2/24.  Xray noted to show acute fractures of bilateral pubic rami. CT pelvis done showing acute fracture of superior and inferior pubic rami and right sacral ala. Hematoma in right lower pelvis. . Code stroke called in ED due to facial droop and right UE weakness. 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 TTE w PFO, Pt planned for d/c to acute rehab 3/7, however cancelled due to elevated WBC (13.31 which has uptrended to 19k today. ID called for the same.     WORKUP  UA: Negative  3/7 CXR IMPRESSION: Low lung volumes. New bibasilar opacities which could be due to atelectasis, small pleural effusions with associated passive atelectasis, and/or pneumonia.  Venous Duplex (3/2): No evidence of deep venous thrombosis in either lower extremity.  MR head (2/25): Acute infarct involving the left posterior putamen and frontal corona radiata. No acute intracranial hemorrhage.  CT Pelvis:  Acute fractures of the superior and inferior pubic rami, bilaterally and of the right sacral ala. Hematoma in the right lower pelvis with slight mass effect upon the right  lateral wall of the bladder.       DIAGNOSIS and IMPRESSION  Leukocytosis, tachycardia, SIRS   Bilateral pubic rami fractures with Pelvic Hematoma  Abnormal CXR        RECOMMENDATIONS  pt stable, non toxic, no worsening symptoms, monitor off abx   MRSA PCR, negative   Blood cx NTD   U/A negative   no diarrhea.   CT chest with no pneumonia   CT abd/pelvis pending given rise in leucocytosis, based on XR abdomen.   trend cbc for leucocytosis, rising   s/p MBS, + aspiration.         Lazara Chirinos  Please contact through MS Teams   If no response or past 5 pm/weekend call [phone].   Trend WBC and monitor for fevers OFF abx .

## 2023-03-13 NOTE — PROGRESS NOTE ADULT - SUBJECTIVE AND OBJECTIVE BOX
CARDIOLOGY FOLLOW UP - Dr. Booker  DATE OF SERVICE: 3/13/23    CC  No CV complaints    REVIEW OF SYSTEMS:  CONSTITUTIONAL: No fever, weight loss, or fatigue  RESPIRATORY: No cough, wheezing, chills or hemoptysis; No Shortness of Breath  CARDIOVASCULAR: No chest pain, palpitations, passing out, dizziness, or leg swelling  GASTROINTESTINAL: No abdominal or epigastric pain. No nausea, vomiting, or hematemesis; No diarrhea or constipation. No melena or hematochezia.  VASCULAR: No edema     PHYSICAL EXAM:  T(C): 36.9 (03-13-23 @ 06:19), Max: 36.9 (03-13-23 @ 06:19)  HR: 118 (03-13-23 @ 06:19) (97 - 118)  BP: 109/69 (03-13-23 @ 06:19) (109/69 - 113/71)  RR: 20 (03-13-23 @ 06:19) (16 - 20)  SpO2: 99% (03-13-23 @ 06:19) (92% - 99%)  Wt(kg): --  I&O's Summary    12 Mar 2023 07:01  -  13 Mar 2023 07:00  --------------------------------------------------------  IN: 420 mL / OUT: 200 mL / NET: 220 mL        Appearance: Elderly female	  Cardiovascular: Normal S1 S2,RRR, No JVD, No murmurs  Respiratory: Lungs clear to auscultation b/l   Gastrointestinal:  Soft, Non-tender, + BS	  Extremities: Normal range of motion, No clubbing, cyanosis or edema      Home Medications:  acetaminophen 325 mg oral tablet: 3 tab(s) orally every 6 hours (07 Mar 2023 11:48)  apixaban 5 mg oral tablet: 1 tab(s) orally 2 times a day (07 Mar 2023 11:48)  metoprolol succinate 25 mg oral tablet, extended release: 1 tab(s) orally once a day (at bedtime) (24 Feb 2023 17:17)  Narcan 4 mg/0.1 mL nasal spray: nasal once a day, As Needed -3 minutes if no or minimal response.  . Naloxone spray 4 mg/0.1 ml intranasal, Spray 0.1 mL into one nostril. Repeat with second device into other nostril after 2-3 minutes if no or minimal response     (07 Mar 2023 11:56)  oxyCODONE 5 mg oral tablet: 0.5 tab(s) orally every 6 hours, As Needed (07 Mar 2023 11:56)  polyethylene glycol 3350 oral powder for reconstitution: 17 gram(s) orally once a day (07 Mar 2023 11:48)  rosuvastatin 40 mg oral tablet: 1 tab(s) orally once a day (at bedtime) (07 Mar 2023 11:48)      MEDICATIONS  (STANDING):  acetaminophen     Tablet .. 975 milliGRAM(s) Oral every 6 hours  apixaban 5 milliGRAM(s) Oral two times a day  chlorhexidine 2% Cloths 1 Application(s) Topical daily  melatonin 3 milliGRAM(s) Oral once  metoprolol succinate ER 25 milliGRAM(s) Oral daily  multivitamin 1 Tablet(s) Oral daily  polyethylene glycol 3350 17 Gram(s) Oral daily  rosuvastatin 40 milliGRAM(s) Oral at bedtime  senna 2 Tablet(s) Oral at bedtime      TELEMETRY: Sinus, Aflutter 80-120s    ECG:  	  RADIOLOGY:   DIAGNOSTIC TESTING:  [ ] Echocardiogram:  [ ]  Catheterization:  [ ] Stress Test:    OTHER: 	    LABS:	 	                            11.4   18.48 )-----------( 272      ( 13 Mar 2023 05:59 )             34.4     03-13    137  |  105  |  24<H>  ----------------------------<  119<H>  4.3   |  19<L>  |  0.75    Ca    8.9      13 Mar 2023 06:00  Phos  3.2     03-13  Mg     2.0     03-13    TPro  4.9<L>  /  Alb  2.3<L>  /  TBili  0.2  /  DBili  x   /  AST  32  /  ALT  35  /  AlkPhos  339<H>  03-12

## 2023-03-14 NOTE — PROGRESS NOTE ADULT - SUBJECTIVE AND OBJECTIVE BOX
CARDIOLOGY FOLLOW UP - Dr. Booker  DATE OF SERVICE: 3/14/23    CC  No CV complaints  Denies dizziness, cp, sob    REVIEW OF SYSTEMS:  CONSTITUTIONAL: No fever, weight loss, or fatigue  RESPIRATORY: No cough, wheezing, chills or hemoptysis; No Shortness of Breath  CARDIOVASCULAR: No chest pain, palpitations, passing out, dizziness, or leg swelling  GASTROINTESTINAL: No abdominal or epigastric pain. No nausea, vomiting, or hematemesis; No diarrhea or constipation. No melena or hematochezia.  VASCULAR: No edema     PHYSICAL EXAM:  T(C): 36.6 (03-14-23 @ 04:10), Max: 36.7 (03-13-23 @ 11:17)  HR: 89 (03-14-23 @ 04:10) (89 - 103)  BP: 110/62 (03-14-23 @ 04:10) (102/61 - 116/69)  RR: 18 (03-14-23 @ 04:10) (18 - 18)  SpO2: 97% (03-14-23 @ 04:10) (92% - 97%)  Wt(kg): --  I&O's Summary    13 Mar 2023 07:01  -  14 Mar 2023 07:00  --------------------------------------------------------  IN: 240 mL / OUT: 500 mL / NET: -260 mL        Appearance: Elderly female	  Cardiovascular: Normal S1 S2,RRR, No JVD, No murmurs  Respiratory: Lungs clear to auscultation b/l  Gastrointestinal:  Soft, Non-tender, + BS	  Extremities: Normal range of motion, No clubbing, cyanosis or edema      Home Medications:  acetaminophen 325 mg oral tablet: 3 tab(s) orally every 6 hours (07 Mar 2023 11:48)  apixaban 5 mg oral tablet: 1 tab(s) orally 2 times a day (07 Mar 2023 11:48)  metoprolol succinate 25 mg oral tablet, extended release: 1 tab(s) orally once a day (at bedtime) (24 Feb 2023 17:17)  Narcan 4 mg/0.1 mL nasal spray: nasal once a day, As Needed -3 minutes if no or minimal response.  . Naloxone spray 4 mg/0.1 ml intranasal, Spray 0.1 mL into one nostril. Repeat with second device into other nostril after 2-3 minutes if no or minimal response     (07 Mar 2023 11:56)  oxyCODONE 5 mg oral tablet: 0.5 tab(s) orally every 6 hours, As Needed (07 Mar 2023 11:56)  polyethylene glycol 3350 oral powder for reconstitution: 17 gram(s) orally once a day (07 Mar 2023 11:48)  rosuvastatin 40 mg oral tablet: 1 tab(s) orally once a day (at bedtime) (07 Mar 2023 11:48)      MEDICATIONS  (STANDING):  acetaminophen     Tablet .. 975 milliGRAM(s) Oral every 6 hours  apixaban 5 milliGRAM(s) Oral two times a day  chlorhexidine 2% Cloths 1 Application(s) Topical daily  dextrose 5% + sodium chloride 0.45%. 1000 milliLiter(s) (60 mL/Hr) IV Continuous <Continuous>  melatonin 3 milliGRAM(s) Oral once  metoprolol succinate ER 25 milliGRAM(s) Oral daily  multivitamin 1 Tablet(s) Oral daily  polyethylene glycol 3350 17 Gram(s) Oral daily  rosuvastatin 40 milliGRAM(s) Oral at bedtime  senna 2 Tablet(s) Oral at bedtime      TELEMETRY: Sinus abdirashid, tach  with episodes of Vtach up to 17 beats    ECG:  	  RADIOLOGY:   DIAGNOSTIC TESTING:  [ ] Echocardiogram:  [ ]  Catheterization:  [ ] Stress Test:    OTHER: 	    LABS:	 	                            9.5    11.69 )-----------( 224      ( 14 Mar 2023 06:15 )             29.7     03-13    137  |  105  |  24<H>  ----------------------------<  119<H>  4.3   |  19<L>  |  0.75    Ca    8.9      13 Mar 2023 06:00  Phos  3.2     03-13  Mg     2.0     03-13

## 2023-03-14 NOTE — PROGRESS NOTE ADULT - SUBJECTIVE AND OBJECTIVE BOX
89yPatient is a 89y old  Female who presents with a chief complaint of fall; pubic rami fracture (14 Mar 2023 14:25)      Interval history:  Afebrile, no new symptoms, frustrated about being here and wants to go to rehab, feels getting weaker each day she is here.       Allergies:   No Known Allergies      Antimicrobials:      REVIEW OF SYSTEMS:  No chest pain   No SOB  No abdominal pain  No rash.       Vital Signs Last 24 Hrs  T(C): 36.6 (03-14-23 @ 11:14), Max: 36.7 (03-13-23 @ 21:05)  T(F): 97.8 (03-14-23 @ 11:14), Max: 98 (03-13-23 @ 21:05)  HR: 93 (03-14-23 @ 11:14) (89 - 95)  BP: 99/66 (03-14-23 @ 11:14) (99/66 - 116/69)  BP(mean): --  RR: 18 (03-14-23 @ 11:14) (18 - 18)  SpO2: 96% (03-14-23 @ 11:14) (92% - 97%)      PHYSICAL EXAM:  Patient in no acute distress. Alert, awake. sitting in chair   Cardiovascular: S1S2 normal.  Lungs: + air entry B/L lung fields.  Gastrointestinal: soft, nontender, nondistended.  IV sites not inflamed.                             10.1   13.16 )-----------( 256      ( 14 Mar 2023 11:56 )             31.5   03-13    137  |  105  |  24<H>  ----------------------------<  119<H>  4.3   |  19<L>  |  0.75    Ca    8.9      13 Mar 2023 06:00  Phos  3.2     03-13  Mg     2.0     03-13          < from: Xray Abdomen 1 View PORTABLE -Routine (Xray Abdomen 1 View PORTABLE -Routine in AM.) (03.14.23 @ 10:04) >  Impression:  Nonobstructive bowel gas pattern.  Contrast is noted within the bowel to the level of the rectum.

## 2023-03-14 NOTE — PROGRESS NOTE ADULT - SUBJECTIVE AND OBJECTIVE BOX
Chief Complaint:  Patient is a 89y old  Female who presents with a chief complaint of fall; pubic rami fracture (14 Mar 2023 10:39)      Date of service 03-14-23 @ 14:25      Interval Events:     Hospital Medications:  acetaminophen     Tablet .. 975 milliGRAM(s) Oral every 6 hours  apixaban 5 milliGRAM(s) Oral two times a day  chlorhexidine 2% Cloths 1 Application(s) Topical daily  dextrose 5% + sodium chloride 0.45%. 1000 milliLiter(s) IV Continuous <Continuous>  melatonin 3 milliGRAM(s) Oral once  multivitamin 1 Tablet(s) Oral daily  oxyCODONE    IR 2.5 milliGRAM(s) Oral every 4 hours PRN  polyethylene glycol 3350 17 Gram(s) Oral daily  rosuvastatin 40 milliGRAM(s) Oral at bedtime  senna 2 Tablet(s) Oral at bedtime        Review of Systems:  General:  No wt loss, fevers, chills, night sweats, fatigue,   Eyes:  Good vision, no reported pain  ENT:  No sore throat, pain, runny nose, dysphagia  CV:  No pain, palpitations, hypo/hypertension  Resp:  No dyspnea, cough, tachypnea, wheezing  GI:  See HPI  :  No pain, bleeding, incontinence, nocturia  Muscle:  No pain, weakness  Neuro:  No weakness, tingling, memory problems  Psych:  No fatigue, insomnia, mood problems, depression  Endocrine:  No polyuria, polydipsia, cold/heat intolerance  Heme:  No petechiae, ecchymosis, easy bruisability  Integumentary:  No rash, edema    PHYSICAL EXAM:   Vital Signs:  Vital Signs Last 24 Hrs  T(C): 36.6 (14 Mar 2023 11:14), Max: 36.7 (13 Mar 2023 21:05)  T(F): 97.8 (14 Mar 2023 11:14), Max: 98 (13 Mar 2023 21:05)  HR: 93 (14 Mar 2023 11:14) (89 - 95)  BP: 99/66 (14 Mar 2023 11:14) (99/66 - 116/69)  BP(mean): --  RR: 18 (14 Mar 2023 11:14) (18 - 18)  SpO2: 96% (14 Mar 2023 11:14) (92% - 97%)    Parameters below as of 14 Mar 2023 11:14  Patient On (Oxygen Delivery Method): room air      Daily     Daily       PHYSICAL EXAM:     GENERAL:  Appears stated age, well-groomed, well-nourished, no distress  HEENT:  NC/AT,  conjunctivae anicteric, clear and pink,   NECK: supple, trachea midline  CHEST:  Full & symmetric excursion, no increased effort, breath sounds clear  HEART:  Regular rhythm, no JVD  ABDOMEN:  Soft, non-tender, non-distended, normoactive bowel sounds,  no masses , no hepatosplenomegaly  EXTREMITIES:  no cyanosis,clubbing or edema  SKIN:  No rash, erythema, or, ecchymoses, no jaundice  NEURO:  Alert, non-focal, no asterixis  PSYCH: Appropriate affect, oriented to place and time  RECTAL: Deferred      LABS Personally reviewed by me:                        10.1   13.16 )-----------( 256      ( 14 Mar 2023 11:56 )             31.5     Mean Cell Volume: 100.3 fl (03-14-23 @ 11:56)    03-13    137  |  105  |  24<H>  ----------------------------<  119<H>  4.3   |  19<L>  |  0.75    Ca    8.9      13 Mar 2023 06:00  Phos  3.2     03-13  Mg     2.0     03-13                                    10.1   13.16 )-----------( 256      ( 14 Mar 2023 11:56 )             31.5                         9.5    11.69 )-----------( 224      ( 14 Mar 2023 06:15 )             29.7                         11.4   18.48 )-----------( 272      ( 13 Mar 2023 05:59 )             34.4                         11.1   17.20 )-----------( 262      ( 12 Mar 2023 10:18 )             35.7                         8.9    12.42 )-----------( 219      ( 12 Mar 2023 06:26 )             28.7       Imaging personally reviewed by me:             Chief Complaint:  Patient is a 89y old  Female who presents with a chief complaint of fall; pubic rami fracture (14 Mar 2023 10:39)      Date of service 03-14-23 @ 14:25      Interval Events:   intake somewhat improved     Hospital Medications:  acetaminophen     Tablet .. 975 milliGRAM(s) Oral every 6 hours  apixaban 5 milliGRAM(s) Oral two times a day  chlorhexidine 2% Cloths 1 Application(s) Topical daily  dextrose 5% + sodium chloride 0.45%. 1000 milliLiter(s) IV Continuous <Continuous>  melatonin 3 milliGRAM(s) Oral once  multivitamin 1 Tablet(s) Oral daily  oxyCODONE    IR 2.5 milliGRAM(s) Oral every 4 hours PRN  polyethylene glycol 3350 17 Gram(s) Oral daily  rosuvastatin 40 milliGRAM(s) Oral at bedtime  senna 2 Tablet(s) Oral at bedtime        Review of Systems:  General:  No wt loss, fevers, chills, night sweats, fatigue,   Eyes:  Good vision, no reported pain  ENT:  No sore throat, pain, runny nose, dysphagia  CV:  No pain, palpitations, hypo/hypertension  Resp:  No dyspnea, cough, tachypnea, wheezing  GI:  See HPI  :  No pain, bleeding, incontinence, nocturia  Muscle:  No pain, weakness  Neuro:  No weakness, tingling, memory problems  Psych:  No fatigue, insomnia, mood problems, depression  Endocrine:  No polyuria, polydipsia, cold/heat intolerance  Heme:  No petechiae, ecchymosis, easy bruisability  Integumentary:  No rash, edema    PHYSICAL EXAM:   Vital Signs:  Vital Signs Last 24 Hrs  T(C): 36.6 (14 Mar 2023 11:14), Max: 36.7 (13 Mar 2023 21:05)  T(F): 97.8 (14 Mar 2023 11:14), Max: 98 (13 Mar 2023 21:05)  HR: 93 (14 Mar 2023 11:14) (89 - 95)  BP: 99/66 (14 Mar 2023 11:14) (99/66 - 116/69)  BP(mean): --  RR: 18 (14 Mar 2023 11:14) (18 - 18)  SpO2: 96% (14 Mar 2023 11:14) (92% - 97%)    Parameters below as of 14 Mar 2023 11:14  Patient On (Oxygen Delivery Method): room air      Daily     Daily       PHYSICAL EXAM:     GENERAL:  Appears stated age, well-groomed, well-nourished, no distress  HEENT:  NC/AT,  conjunctivae anicteric, clear and pink,   NECK: supple, trachea midline  CHEST:  Full & symmetric excursion, no increased effort, breath sounds clear  HEART:  Regular rhythm, no JVD  ABDOMEN:  Soft, non-tender, non-distended, normoactive bowel sounds,  no masses , no hepatosplenomegaly  EXTREMITIES:  no cyanosis,clubbing or edema  SKIN:  No rash, erythema, or, ecchymoses, no jaundice  NEURO:  Alert, non-focal, no asterixis  PSYCH: Appropriate affect, oriented to place and time  RECTAL: Deferred      LABS Personally reviewed by me:                        10.1   13.16 )-----------( 256      ( 14 Mar 2023 11:56 )             31.5     Mean Cell Volume: 100.3 fl (03-14-23 @ 11:56)    03-13    137  |  105  |  24<H>  ----------------------------<  119<H>  4.3   |  19<L>  |  0.75    Ca    8.9      13 Mar 2023 06:00  Phos  3.2     03-13  Mg     2.0     03-13                                    10.1   13.16 )-----------( 256      ( 14 Mar 2023 11:56 )             31.5                         9.5    11.69 )-----------( 224      ( 14 Mar 2023 06:15 )             29.7                         11.4   18.48 )-----------( 272      ( 13 Mar 2023 05:59 )             34.4                         11.1   17.20 )-----------( 262      ( 12 Mar 2023 10:18 )             35.7                         8.9    12.42 )-----------( 219      ( 12 Mar 2023 06:26 )             28.7       Imaging personally reviewed by me:

## 2023-03-14 NOTE — PROGRESS NOTE ADULT - ASSESSMENT
1. Stroke, Afib  - on Eliquis   - per neurology     2. dysphagia, failure to thrive  - the pt has no signs of esophageal dysphagia. Options discussed with the patient and her family; will try conservative measures for now and if all else fails and she is unable to meet nutritional requirements on current diet, can consider PEG.     3. HTN  -Continue metoprolol  -May continue to hold ace/arb for hypotension    4. Pubic rami fx w/ hematoma  - Per ortho no surgical intervention  - Monitor h/h while on apixaban    5. Leukocytosis  - per ID       I had a prolonged conversation with the patient regarding the hospital course, differential diagnosis, results of diagnostic tests this far, and therapeutic modalities available. Plan of care discussed with the patient after the evaluation. Patient expresses a clear understanding of the plan of care. Sixty five minutes spent on the total encounter, of which more than fifty percent of the encounter was spent on counseling and/or coordinating care by the attending physician.    Advanced care planning forms were discussed. Code status including forceful chest compressions, defibrillation and intubation were discussed. The risks benefits and alternatives to pertinent gastrointestinal procedures and interventions were discussed in detail and all questions were answered. Duration: 15 Minutes.    Amina Ortiz M.D.   Gastroenterology and Hepatology  [address] [place], [place]  Office: [phone]  Cell: [phone] 1. Stroke, Afib  - on Eliquis   - per neurology     2. oropharyngeal dysphagia & failure to thrive post stroke  - Options discussed with the patient and her family; will try conservative measures for now and if all else fails and she is unable to meet nutritional requirements on her current diet, can consider PEG.     3. HTN  - Continue metoprolol  - May continue to hold ace/arb for hypotension    4. Pubic rami fx w/ hematoma  - Per ortho no surgical intervention  - Monitor h/h while on apixaban    5. Leukocytosis, trending down   - per ANGELA Ortiz M.D.   Gastroenterology and Hepatology  [address] [place], [place]  Office: [phone]  Cell: [phone]

## 2023-03-14 NOTE — PROGRESS NOTE ADULT - ASSESSMENT
89 year old female from home AAO x3, with PMH of Afib on xarelto, CAD, HTN, HLD and history of right ankle fracture s/p repair 9 months ago, who presented to the ED s/p fall at home.     WBC of 15K on admission. Xray noted to show acute fractures of bilateral pubic rami. CT pelvis done showing acute fracture of superior and inferior pubic rami and right sacral ala. Hematoma in right lower pelvis. CXR reviewed; appears clear. Code stroke called in ED due to facial droop and right UE weakness. CT head, CTA brain and neck done with no acute abnormalities.       Acute  CVA , confirmed w MR 2/25, RUE hemiparesis, dysarthria and expressive aphasia  TTE w PFO, cardiology following  ptn was on chronic AC w Xarelto, however it appears to have been  a therapeutic failure, changed to Eliquis 5 mg bid. Neuro in agreement. cardiology in agreement  PT f/up  PMR consult done, awaiting AR  MBS done, on dysphagia diet  Poor po intake- IVF  Leukocytosis: CT chest 3/10  c/w CHF  ID following, observe off ABx  Dw pt's son  CT abd and Pelvis =WBC 11 -family doesn't want CT abd  GI f/up appreciated  Dw stroke team: Cont Xarelto/F/up with Dr Arias in 2 weeks

## 2023-03-14 NOTE — PROGRESS NOTE ADULT - SUBJECTIVE AND OBJECTIVE BOX
Patient is a 89y old  Female who presents with a chief complaint of fall; pubic rami fracture (14 Mar 2023 17:33)      SUBJECTIVE / OVERNIGHT EVENTS:    Events noted.  CONSTITUTIONAL: No fever,  or fatigue  RESPIRATORY: No cough, wheezing,  No shortness of breath  CARDIOVASCULAR: No chest pain, palpitations  GASTROINTESTINAL: No abdominal       MEDICATIONS  (STANDING):  acetaminophen     Tablet .. 975 milliGRAM(s) Oral every 6 hours  apixaban 5 milliGRAM(s) Oral two times a day  chlorhexidine 2% Cloths 1 Application(s) Topical daily  dextrose 5% + sodium chloride 0.45%. 1000 milliLiter(s) (60 mL/Hr) IV Continuous <Continuous>  melatonin 5 milliGRAM(s) Oral at bedtime  metoprolol succinate ER 50 milliGRAM(s) Oral daily  multivitamin 1 Tablet(s) Oral daily  polyethylene glycol 3350 17 Gram(s) Oral daily  rosuvastatin 40 milliGRAM(s) Oral at bedtime  senna 2 Tablet(s) Oral at bedtime    MEDICATIONS  (PRN):  oxyCODONE    IR 2.5 milliGRAM(s) Oral every 4 hours PRN Moderate Pain (4 - 6)        CAPILLARY BLOOD GLUCOSE        I&O's Summary    13 Mar 2023 07:01  -  14 Mar 2023 07:00  --------------------------------------------------------  IN: 240 mL / OUT: 500 mL / NET: -260 mL    14 Mar 2023 07:01  -  15 Mar 2023 00:25  --------------------------------------------------------  IN: 840 mL / OUT: 600 mL / NET: 240 mL        T(C): 36.7 (03-14-23 @ 20:38), Max: 36.7 (03-14-23 @ 20:38)  HR: 89 (03-14-23 @ 20:38) (89 - 93)  BP: 118/74 (03-14-23 @ 20:38) (99/66 - 118/74)  RR: 18 (03-14-23 @ 20:38) (18 - 18)  SpO2: 92% (03-14-23 @ 20:38) (92% - 97%)    PHYSICAL EXAM:  GENERAL: NAD  NECK: Supple, No JVD  CHEST/LUNG: Clear to auscultation bilaterally; No wheezing.  HEART: Regular rate and rhythm; No murmurs, rubs, or gallops  ABDOMEN: Soft, Nontender, Nondistended; Bowel sounds present  EXTREMITIES:   No edema  NEUROLOGY: AAO       LABS:                        10.1   13.16 )-----------( 256      ( 14 Mar 2023 11:56 )             31.5     03-13    137  |  105  |  24<H>  ----------------------------<  119<H>  4.3   |  19<L>  |  0.75    Ca    8.9      13 Mar 2023 06:00  Phos  3.2     03-13  Mg     2.0     03-13              CAPILLARY BLOOD GLUCOSE            RADIOLOGY & ADDITIONAL TESTS:    Imaging Personally Reviewed:    Consultant(s) Notes Reviewed:      Care Discussed with Consultants/Other Providers:    Francisco Cheng MD, CMD, FACP    [address] [place] [address]  Office Tel: [phone]  Cell: [phone]

## 2023-03-14 NOTE — PROGRESS NOTE ADULT - ASSESSMENT
89 year old female from home AAO x3, with PMH of Afib on xarelto, CAD, HTN, HLD and history of right ankle fracture s/p repair 9 months ago, who presented to the ED s/p fall at home on 2/24.  Xray noted to show acute fractures of bilateral pubic rami. CT pelvis done showing acute fracture of superior and inferior pubic rami and right sacral ala. Hematoma in right lower pelvis. . Code stroke called in ED due to facial droop and right UE weakness. 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 TTE w PFO, Pt planned for d/c to acute rehab 3/7, however cancelled due to elevated WBC (13.31 which has uptrended to 19k today. ID called for the same.     WORKUP  UA: Negative  3/7 CXR IMPRESSION: Low lung volumes. New bibasilar opacities which could be due to atelectasis, small pleural effusions with associated passive atelectasis, and/or pneumonia.  Venous Duplex (3/2): No evidence of deep venous thrombosis in either lower extremity.  MR head (2/25): Acute infarct involving the left posterior putamen and frontal corona radiata. No acute intracranial hemorrhage.  CT Pelvis:  Acute fractures of the superior and inferior pubic rami, bilaterally and of the right sacral ala. Hematoma in the right lower pelvis with slight mass effect upon the right  lateral wall of the bladder.       DIAGNOSIS and IMPRESSION  Leukocytosis, tachycardia, SIRS   Bilateral pubic rami fractures with Pelvic Hematoma  Abnormal CXR        RECOMMENDATIONS  pt stable, non toxic, no worsening symptoms, monitor off abx   MRSA PCR, negative   Blood cx NTD   U/A negative   no diarrhea.   CT chest with no pneumonia   CT abd/pelvis pending given leucocytosis, based on XR abdomen.   trend cbc for leucocytosis, spontaneously downtrended.   s/p MBS, + aspiration.   discussed with daughter at bedside and pt's son who is a physician over the phone, pt with fluctuating white count, no localizing symptom.   they prefer to pursue work from rehab if needed or come back if any change in clinical status rather than staying here monitoring leucocytosis.   advised to have repeat CBC mid next week if pt otherwise stable, if any change in clinical status would be evaluated earlier and might need to come back to hospital.   they understand and verbalize. will hold off on the ct abd/pelvis at this time.        Plan discussed with Medicine NP.       Lazara Chirinos  Please contact through MS Teams   If no response or past 5 pm/weekend call [phone].   Trend WBC and monitor for fevers OFF abx .

## 2023-03-14 NOTE — PROGRESS NOTE ADULT - ASSESSMENT
Echo 4/6/21: Nml LV fxn EF 61%  Echo 2/27/23: Mild anteroseptal hypokinesis EF 45-50%, Mild diastolic dysfxn, Patent foramen ovale    A/P  89 year old female from home AAO x3, with PMH of Afib on xarelto, CAD, HTN, HLD and history of right ankle fracture s/p repair 9 months ago, who presented to the ED s/p fall at home, found to have b/l pubic rami fx with hematoma and acute infarct on MRI.    #Acute CVA  -Likely cause of fall given reported hx and no cardiac prodrome sx  -Neuro following  -Continue atorvastatin  -Prior echo with nml lv fxn  -Echo with PFO, anteroseptal hypokinesis  -LE duplex negative for DVT  -F/u neurology  -Likely xarelto failure  -Continue apixaban    #Afib  -Several episodes of vtach on tele up to 17 beats  -Check electrolytes  -Increase metoprolol to 50mg qd  -Continue apixaban  -Echo results as above    #CAD  -Continue atorvastatin    #HTN  -Continue metoprolol  -May continue to hold ace/arb for hypotension    #Pubic rami fx w/ hematoma  -Per ortho no surgical intervention  -Monitor h/h while on apixaban    #Leukocytosis  -ct chest noted  -Abx per primary, id   -id f/u    #ct chest with b/l effusions post ivf  -d/c ivf  -no clinical HF  -lasix as needed if dyspnea, orthopnea, worsening hypoxia      d/w acp  Please call/text me with questions/concerns between 8am-4pm  [phone]

## 2023-03-15 NOTE — PROGRESS NOTE ADULT - SUBJECTIVE AND OBJECTIVE BOX
Chief Complaint:  Patient is a 89y old  Female who presents with a chief complaint of fall; pubic rami fracture (15 Mar 2023 11:35)      Date of service 03-15-23 @ 13:28      Interval Events:   Patient seen and examined.   Speech and PO intake improving.    Hospital Medications:  acetaminophen     Tablet .. 975 milliGRAM(s) Oral every 6 hours  apixaban 5 milliGRAM(s) Oral two times a day  chlorhexidine 2% Cloths 1 Application(s) Topical daily  dextrose 5% + sodium chloride 0.45%. 1000 milliLiter(s) IV Continuous <Continuous>  melatonin 5 milliGRAM(s) Oral at bedtime  metoprolol succinate ER 50 milliGRAM(s) Oral daily  multivitamin 1 Tablet(s) Oral daily  polyethylene glycol 3350 17 Gram(s) Oral daily  rosuvastatin 40 milliGRAM(s) Oral at bedtime  senna 2 Tablet(s) Oral at bedtime        Review of Systems:  General:  No wt loss, fevers, chills, night sweats, fatigue,   Eyes:  Good vision, no reported pain  ENT:  No sore throat, pain, runny nose, dysphagia  CV:  No pain, palpitations, hypo/hypertension  Resp:  No dyspnea, cough, tachypnea, wheezing  GI:  See HPI  :  No pain, bleeding, incontinence, nocturia  Muscle:  No pain, weakness  Neuro:  No weakness, tingling, memory problems  Psych:  No fatigue, insomnia, mood problems, depression  Endocrine:  No polyuria, polydipsia, cold/heat intolerance  Heme:  No petechiae, ecchymosis, easy bruisability  Integumentary:  No rash, edema    PHYSICAL EXAM:   Vital Signs:  Vital Signs Last 24 Hrs  T(C): 36.7 (15 Mar 2023 11:00), Max: 36.7 (14 Mar 2023 20:38)  T(F): 98 (15 Mar 2023 11:00), Max: 98.1 (14 Mar 2023 20:38)  HR: 88 (15 Mar 2023 11:00) (88 - 96)  BP: 111/75 (15 Mar 2023 11:00) (111/75 - 120/72)  BP(mean): --  RR: 18 (15 Mar 2023 11:00) (18 - 18)  SpO2: 94% (15 Mar 2023 11:00) (92% - 96%)    Parameters below as of 15 Mar 2023 11:00  Patient On (Oxygen Delivery Method): room air      Daily     Daily       PHYSICAL EXAM:     GENERAL:  Appears stated age, well-groomed, well-nourished, no distress  HEENT:  NC/AT,  conjunctivae anicteric, clear and pink,   NECK: supple, trachea midline  CHEST:  Full & symmetric excursion, no increased effort, breath sounds clear  HEART:  Regular rhythm, no JVD  ABDOMEN:  Soft, non-tender, non-distended, normoactive bowel sounds,  no masses , no hepatosplenomegaly  EXTREMITIES:  no cyanosis,clubbing or edema  SKIN:  No rash, erythema, or, ecchymoses, no jaundice  NEURO:  Alert, non-focal, no asterixis  PSYCH: Appropriate affect, oriented to place and time  RECTAL: Deferred      LABS Personally reviewed by me:                        11.7   14.79 )-----------( 271      ( 15 Mar 2023 06:25 )             35.8     Mean Cell Volume: 100.3 fl (03-15-23 @ 06:25)    03-15    134<L>  |  101  |  22  ----------------------------<  104<H>  4.8   |  22  |  0.77    Ca    8.8      15 Mar 2023 06:24                                    11.7   14.79 )-----------( 271      ( 15 Mar 2023 06:25 )             35.8                         10.1   13.16 )-----------( 256      ( 14 Mar 2023 11:56 )             31.5                         9.5    11.69 )-----------( 224      ( 14 Mar 2023 06:15 )             29.7                         11.4   18.48 )-----------( 272      ( 13 Mar 2023 05:59 )             34.4       Imaging personally reviewed by me:

## 2023-03-15 NOTE — PROGRESS NOTE ADULT - SUBJECTIVE AND OBJECTIVE BOX
no new complaints  pain controlled     REVIEW OF SYSTEMS  Constitutional - No fever,  No fatigue  HEENT - No vertigo, No neck pain  Neurological - No headaches, No memory loss, No loss of strength, No numbness, No tremors  Skin - No rashes, No lesions   Musculoskeletal - No joint pain, No joint swelling, No muscle pain  Psychiatric - No depression, No anxiety    FUNCTIONAL PROGRESS  3/14 SLP  dysphagia  puree/moderately thick liquids    3/15 PT  bed mobility mod assist  transfers mod assist x 2  gait mod assist x 2, 3 steps   sitting EOB x 10 min with min assist     3/15 OT  bed mobility mod assist  transfers mod assist x 2    VITALS  T(C): 36.7 (03-15-23 @ 11:00), Max: 36.7 (03-14-23 @ 20:38)  HR: 64 (03-15-23 @ 14:14) (64 - 96)  BP: 100/67 (03-15-23 @ 14:14) (100/67 - 120/72)  RR: 17 (03-15-23 @ 14:14) (17 - 18)  SpO2: 95% (03-15-23 @ 14:14) (92% - 96%)  Wt(kg): --    MEDICATIONS   acetaminophen     Tablet .. 975 milliGRAM(s) every 6 hours  apixaban 5 milliGRAM(s) two times a day  chlorhexidine 2% Cloths 1 Application(s) daily  dextrose 5% + sodium chloride 0.45%. 1000 milliLiter(s) <Continuous>  melatonin 5 milliGRAM(s) at bedtime  metoprolol succinate ER 25 milliGRAM(s) once  multivitamin 1 Tablet(s) daily  polyethylene glycol 3350 17 Gram(s) daily  rosuvastatin 40 milliGRAM(s) at bedtime  senna 2 Tablet(s) at bedtime      RECENT LABS - Reviewed                        11.7   14.79 )-----------( 271      ( 15 Mar 2023 06:25 )             35.8     03-15    134<L>  |  101  |  22  ----------------------------<  104<H>  4.8   |  22  |  0.77    Ca    8.8      15 Mar 2023 06:24          PHYSICAL EXAM  Constitutional - NAD, Comfortable, in chair   Chest - breathing comfortably   Cardiovascular - warm and well perfused  Abdomen - NTTP ND  Extremities - No C/C/E, No calf tenderness   Neurologic Exam - Alert and oriented to self, place, date, situation, R facial droop  RUE/RLE 0/5; LUE/LLE 4+/5.    Speech hypophonic and mildly dysarthric   No clonus  Psychiatric - Mood stable, Affect WNL      Impression   89 year old woman with h/o afib on xarelto, HTN, right ankle fracture, with functional deficits after CVA, right hemiparesis, dysphagia     MRI with acute infarct, left posterior putamen and frontal corona radiata. No acute intracranial hemorrhage.  bilateral pubic rami fracture, hematoma, WBAT  leukocytosis, workup negative, off antibiotics, ID Following, plan to hold off on CT abd/pelvis  afib, on metoprolol, apixaban, cardiology following   bowel regimen  pain tylenol prn     patient has been participating with therapy, requires intensive PT/OT/SLP to restore function, while being closely monitored and managed for ongoing medical issues which can destabilize     Recommend ACUTE inpatient rehabilitation for the functional deficits consisting of 3 hours of therapy/day & 24 hour RN/daily PMR physician for comorbid medical management. Will continue to follow for ongoing rehab needs and recommendations. Patient will be able to tolerate 3 hours a day.    Continue bedside therapy as well as OOB throughout the day with mobilization throughout the day with staff to maintain cardiopulmonary function and prevention of secondary complications related to debility.

## 2023-03-15 NOTE — PROGRESS NOTE ADULT - SUBJECTIVE AND OBJECTIVE BOX
CARDIOLOGY FOLLOW UP - Dr. Booker  DATE OF SERVICE: 3/15/23    CC  No CV complaints    REVIEW OF SYSTEMS:  CONSTITUTIONAL: No fever, weight loss, or fatigue  RESPIRATORY: No cough, wheezing, chills or hemoptysis; No Shortness of Breath  CARDIOVASCULAR: No chest pain, palpitations, passing out, dizziness, or leg swelling  GASTROINTESTINAL: No abdominal or epigastric pain. No nausea, vomiting, or hematemesis; No diarrhea or constipation. No melena or hematochezia.  VASCULAR: No edema     PHYSICAL EXAM:  T(C): 36.7 (03-15-23 @ 11:00), Max: 36.7 (03-14-23 @ 20:38)  HR: 88 (03-15-23 @ 11:00) (88 - 96)  BP: 111/75 (03-15-23 @ 11:00) (111/75 - 120/72)  RR: 18 (03-15-23 @ 11:00) (18 - 18)  SpO2: 94% (03-15-23 @ 11:00) (92% - 96%)  Wt(kg): --  I&O's Summary    14 Mar 2023 07:01  -  15 Mar 2023 07:00  --------------------------------------------------------  IN: 840 mL / OUT: 900 mL / NET: -60 mL        Appearance: Elderly female	  Cardiovascular: Normal S1 S2,RRR, No JVD, No murmurs  Respiratory: Lungs clear to auscultation b/l  Gastrointestinal:  Soft, Non-tender, + BS	  Extremities: Normal range of motion, No clubbing, cyanosis or edema      Home Medications:  acetaminophen 325 mg oral tablet: 3 tab(s) orally every 6 hours (07 Mar 2023 11:48)  apixaban 5 mg oral tablet: 1 tab(s) orally 2 times a day (07 Mar 2023 11:48)  metoprolol succinate 25 mg oral tablet, extended release: 1 tab(s) orally once a day (at bedtime) (24 Feb 2023 17:17)  Narcan 4 mg/0.1 mL nasal spray: nasal once a day, As Needed -3 minutes if no or minimal response.  . Naloxone spray 4 mg/0.1 ml intranasal, Spray 0.1 mL into one nostril. Repeat with second device into other nostril after 2-3 minutes if no or minimal response     (07 Mar 2023 11:56)  oxyCODONE 5 mg oral tablet: 0.5 tab(s) orally every 6 hours, As Needed (07 Mar 2023 11:56)  polyethylene glycol 3350 oral powder for reconstitution: 17 gram(s) orally once a day (07 Mar 2023 11:48)  rosuvastatin 40 mg oral tablet: 1 tab(s) orally once a day (at bedtime) (07 Mar 2023 11:48)      MEDICATIONS  (STANDING):  acetaminophen     Tablet .. 975 milliGRAM(s) Oral every 6 hours  apixaban 5 milliGRAM(s) Oral two times a day  chlorhexidine 2% Cloths 1 Application(s) Topical daily  dextrose 5% + sodium chloride 0.45%. 1000 milliLiter(s) (60 mL/Hr) IV Continuous <Continuous>  melatonin 5 milliGRAM(s) Oral at bedtime  metoprolol succinate ER 50 milliGRAM(s) Oral daily  multivitamin 1 Tablet(s) Oral daily  polyethylene glycol 3350 17 Gram(s) Oral daily  rosuvastatin 40 milliGRAM(s) Oral at bedtime  senna 2 Tablet(s) Oral at bedtime      TELEMETRY: Sinus , 13 beats vtach	    ECG:  	  RADIOLOGY:   DIAGNOSTIC TESTING:  [ ] Echocardiogram:  [ ]  Catheterization:  [ ] Stress Test:    OTHER: 	    LABS:	 	                            11.7   14.79 )-----------( 271      ( 15 Mar 2023 06:25 )             35.8     03-15    134<L>  |  101  |  22  ----------------------------<  104<H>  4.8   |  22  |  0.77    Ca    8.8      15 Mar 2023 06:24

## 2023-03-15 NOTE — PROGRESS NOTE ADULT - ASSESSMENT
89 year old female from home AAO x3, with PMH of Afib on xarelto, CAD, HTN, HLD and history of right ankle fracture s/p repair 9 months ago, who presented to the ED s/p fall at home.     WBC of 15K on admission. Xray noted to show acute fractures of bilateral pubic rami. CT pelvis done showing acute fracture of superior and inferior pubic rami and right sacral ala. Hematoma in right lower pelvis. CXR reviewed; appears clear. Code stroke called in ED due to facial droop and right UE weakness. CT head, CTA brain and neck done with no acute abnormalities.       Acute  CVA , confirmed w MR 2/25, RUE hemiparesis, dysarthria and expressive aphasia  TTE w PFO, cardiology following  ptn was on chronic AC w Xarelto, however it appears to have been  a therapeutic failure, changed to Eliquis 5 mg bid. Neuro in agreement. cardiology in agreement  PT f/up  PMR consult done, awaiting AR  MBS done, on dysphagia diet  Poor po intake- IVF  Leukocytosis: CT chest 3/10  c/w CHF  ID following, observe off ABx  Dw pt's son  CT abd and Pelvis =WBC 11 -family doesn't want CT abd  GI f/up appreciated  Dw stroke team: Cont Xarelto/F/up with Dr Arias in 2 weeks     WCT Cards follow up   MED  ADJUSTMENT

## 2023-03-15 NOTE — PROGRESS NOTE ADULT - ASSESSMENT
Echo 4/6/21: Nml LV fxn EF 61%  Echo 2/27/23: Mild anteroseptal hypokinesis EF 45-50%, Mild diastolic dysfxn, Patent foramen ovale    A/P  89 year old female from home AAO x3, with PMH of Afib on xarelto, CAD, HTN, HLD and history of right ankle fracture s/p repair 9 months ago, who presented to the ED s/p fall at home, found to have b/l pubic rami fx with hematoma and acute infarct on MRI.    #Acute CVA  -Likely cause of fall given reported hx and no cardiac prodrome sx  -Neuro following  -Continue atorvastatin  -Prior echo with nml lv fxn  -Echo with PFO, anteroseptal hypokinesis  -LE duplex negative for DVT  -F/u neurology  -Likely xarelto failure  -Continue apixaban    #Afib  -Several episodes of nsvtach on tele up to 17 beats  -Replete electrolytes prn  -Continue metoprolol to 50mg qd  -Continue apixaban  -Echo results as above    #CAD  -Continue atorvastatin    #HTN  -Continue metoprolol  -May continue to hold ace/arb for hypotension    #Pubic rami fx w/ hematoma  -Per ortho no surgical intervention  -Monitor h/h while on apixaban    #Leukocytosis  -ct chest noted  -Abx per primary, id   -id f/u    #ct chest with b/l effusions post ivf  -d/c ivf  -no clinical HF  -lasix as needed if dyspnea, orthopnea, worsening hypoxia      No CV CI to d/c  Please call/text me with questions/concerns between 8am-4pm  [phone]

## 2023-03-15 NOTE — PROGRESS NOTE ADULT - ASSESSMENT
1. Stroke, Afib  - on Eliquis   - per neurology     2. oropharyngeal dysphagia & failure to thrive post stroke  - Options discussed with the patient and her family; will try conservative measures for now and if all else fails and she is unable to meet nutritional requirements on her current diet, can consider PEG.   - PO intake improving. Seen eating breakfast tray and tolerating well.     3. HTN  - per cards     4. Pubic rami fx w/ hematoma  - Per ortho no surgical intervention  - Monitor h/h while on apixaban    5. Leukocytosis, trending down   - per ID       Attending supervision statement: I have personally seen and examined the patient. I fully participated in the care of this patient. I have made amendments to the documentation where necessary, and agree with the history, physical exam, and plan as outlined by the ACP.    [facility]   Gastroenterology and Hepatology  [address] [place] [id number]  Office: [phone]  Cell: [phone]

## 2023-03-15 NOTE — PROGRESS NOTE ADULT - SUBJECTIVE AND OBJECTIVE BOX
Patient is a 89y old  Female who presents with a chief complaint of fall; pubic rami fracture (14 Mar 2023 17:33)      SUBJECTIVE / OVERNIGHT EVENTS:  20-25 WCT on monitor     D/W Family Bed side   Will hold discharge today       Events noted.  CONSTITUTIONAL: No fever,  or fatigue  RESPIRATORY: No cough, wheezing,  No shortness of breath  CARDIOVASCULAR: No chest pain, palpitations  GASTROINTESTINAL: No abdominal       MEDICATIONS  (STANDING):  acetaminophen     Tablet .. 975 milliGRAM(s) Oral every 6 hours  apixaban 5 milliGRAM(s) Oral two times a day  chlorhexidine 2% Cloths 1 Application(s) Topical daily  dextrose 5% + sodium chloride 0.45%. 1000 milliLiter(s) (60 mL/Hr) IV Continuous <Continuous>  melatonin 5 milliGRAM(s) Oral at bedtime  multivitamin 1 Tablet(s) Oral daily  polyethylene glycol 3350 17 Gram(s) Oral daily  rosuvastatin 40 milliGRAM(s) Oral at bedtime  senna 2 Tablet(s) Oral at bedtime    MEDICATIONS  (PRN):      CAPILLARY BLOOD GLUCOSE        I&O's Summary    13 Mar 2023 07:01  -  14 Mar 2023 07:00  --------------------------------------------------------  IN: 240 mL / OUT: 500 mL / NET: -260 mL    14 Mar 2023 07:01  -  15 Mar 2023 00:25  --------------------------------------------------------  IN: 840 mL / OUT: 600 mL / NET: 240 mL        T(C): 36.7 (03-14-23 @ 20:38), Max: 36.7 (03-14-23 @ 20:38)  HR: 89 (03-14-23 @ 20:38) (89 - 93)  BP: 118/74 (03-14-23 @ 20:38) (99/66 - 118/74)  RR: 18 (03-14-23 @ 20:38) (18 - 18)  SpO2: 92% (03-14-23 @ 20:38) (92% - 97%)    PHYSICAL EXAM:  GENERAL: NAD  NECK: Supple, No JVD  CHEST/LUNG: Clear to auscultation bilaterally; No wheezing.  HEART: Regular rate and rhythm; No murmurs, rubs, or gallops  ABDOMEN: Soft, Nontender, Nondistended; Bowel sounds present  EXTREMITIES:   No edema  NEUROLOGY: AAO                             11.7   14.79 )-----------( 271      ( 15 Mar 2023 06:25 )             35.8               134|101|22<104  4.8|22|0.77  8.8,--,--  03-15 @ 06:24            CAPILLARY BLOOD GLUCOSE            RADIOLOGY & ADDITIONAL TESTS:    Imaging Personally Reviewed:    Consultant(s) Notes Reviewed:      Care Discussed with Consultants/Other Providers:    Francisco Cheng MD, CMD, FACP    [address] [place] [address]  Office Tel: [phone]  Cell: [phone]

## 2023-03-15 NOTE — CHART NOTE - NSCHARTNOTEFT_GEN_A_CORE
Pt had another episode of WCT - this time 30 beats (approx 35 seconds).   D/w cardiology - will increase metoprolol to 75mg. Pt had another episode of WCT - this time 30 beats (approx 35 seconds).   D/w cardiology - will increase metoprolol to 75mg starting tomorrow. Will give an additional 25mg STAT, as pt got 50mg this AM.

## 2023-03-16 NOTE — PROGRESS NOTE ADULT - ASSESSMENT
Echo 4/6/21: Nml LV fxn EF 61%  Echo 2/27/23: Mild anteroseptal hypokinesis EF 45-50%, Mild diastolic dysfxn, Patent foramen ovale    A/P  89 year old female from home AAO x3, with PMH of Afib on xarelto, CAD, HTN, HLD and history of right ankle fracture s/p repair 9 months ago, who presented to the ED s/p fall at home, found to have b/l pubic rami fx with hematoma and acute infarct on MRI.    #Acute CVA  -Likely cause of fall given reported hx and no cardiac prodrome sx  -Neuro following  -Continue atorvastatin  -Prior echo with nml lv fxn  -Echo with PFO, anteroseptal hypokinesis  -LE duplex negative for DVT  -F/u neurology  -Likely xarelto failure  -Continue apixaban    #Afib  -Several episodes of nsvtach on tele up to 30 beats  -Overnight only 4-5 beats, otherwise sinus  -Replete electrolytes prn  -Metoprolol increased to 75mg qd, continue as ordered  -Continue apixaban  -Echo results as above    #CAD  -Continue atorvastatin    #HTN  -Continue metoprolol  -May continue to hold ace/arb for hypotension    #Pubic rami fx w/ hematoma  -Per ortho no surgical intervention  -Monitor h/h while on apixaban    #Leukocytosis  -ct chest noted  -Abx per primary, id   -id f/u    #ct chest with b/l effusions post ivf  -d/c ivf  -no clinical HF  -lasix as needed if dyspnea, orthopnea, worsening hypoxia      No CV CI to d/c  D/w pts son and family at bedside  Please call/text me with questions/concerns between 8am-4pm  [phone]

## 2023-03-16 NOTE — PROGRESS NOTE ADULT - ASSESSMENT
1. Stroke, Afib  - on Eliquis   - per neurology     2. oropharyngeal dysphagia & failure to thrive post stroke  - Options discussed with the patient and her family; will try conservative measures for now and if all else fails and she is unable to meet nutritional requirements on her current diet, can consider PEG.   - PO intake improving    3. HTN  - per cards     4. Pubic rami fx w/ hematoma  - Per ortho no surgical intervention  - Monitor h/h while on apixaban    5. Leukocytosis, trending down   - per ID    DC planning        Attending supervision statement: I have personally seen and examined the patient. I fully participated in the care of this patient. I have made amendments to the documentation where necessary, and agree with the history, physical exam, and plan as outlined by the ACP.    [facility]   Gastroenterology and Hepatology  [address] [place] [address]  Office: [phone]  Cell: [phone]

## 2023-03-16 NOTE — PROGRESS NOTE ADULT - SUBJECTIVE AND OBJECTIVE BOX
Chief Complaint:  Patient is a 89y old  Female who presents with a chief complaint of fall; pubic rami fracture (15 Mar 2023 17:50)      Date of service 03-16-23 @ 13:42      Interval Events:   Patient seen and examined.   PO intake slowly improving.     Hospital Medications:  acetaminophen     Tablet .. 975 milliGRAM(s) Oral every 6 hours  apixaban 5 milliGRAM(s) Oral two times a day  chlorhexidine 2% Cloths 1 Application(s) Topical daily  dextrose 5% + sodium chloride 0.45%. 1000 milliLiter(s) IV Continuous <Continuous>  melatonin 5 milliGRAM(s) Oral at bedtime  metoprolol succinate ER 75 milliGRAM(s) Oral daily  multivitamin 1 Tablet(s) Oral daily  polyethylene glycol 3350 17 Gram(s) Oral daily  rosuvastatin 40 milliGRAM(s) Oral at bedtime  senna 2 Tablet(s) Oral at bedtime        Review of Systems:  General:  No wt loss, fevers, chills, night sweats, fatigue,   Eyes:  Good vision, no reported pain  ENT:  No sore throat, pain, runny nose, dysphagia  CV:  No pain, palpitations, hypo/hypertension  Resp:  No dyspnea, cough, tachypnea, wheezing  GI:  See HPI  :  No pain, bleeding, incontinence, nocturia  Muscle:  No pain, weakness  Neuro:  No weakness, tingling, memory problems  Psych:  No fatigue, insomnia, mood problems, depression  Endocrine:  No polyuria, polydipsia, cold/heat intolerance  Heme:  No petechiae, ecchymosis, easy bruisability  Integumentary:  No rash, edema    PHYSICAL EXAM:   Vital Signs:  Vital Signs Last 24 Hrs  T(C): 36.2 (16 Mar 2023 11:07), Max: 36.7 (15 Mar 2023 21:43)  T(F): 97.1 (16 Mar 2023 11:07), Max: 98.1 (15 Mar 2023 21:43)  HR: 84 (16 Mar 2023 11:07) (64 - 92)  BP: 101/62 (16 Mar 2023 11:07) (100/67 - 112/74)  BP(mean): --  RR: 18 (16 Mar 2023 11:07) (17 - 18)  SpO2: 94% (16 Mar 2023 11:07) (94% - 96%)    Parameters below as of 16 Mar 2023 11:07  Patient On (Oxygen Delivery Method): nasal cannula  O2 Flow (L/min): 3    Daily     Daily       PHYSICAL EXAM:     GENERAL:  Appears stated age, well-groomed, well-nourished, no distress  HEENT:  NC/AT,  conjunctivae anicteric, clear and pink,   NECK: supple, trachea midline  CHEST:  Full & symmetric excursion, no increased effort, breath sounds clear  HEART:  Regular rhythm, no JVD  ABDOMEN:  Soft, non-tender, non-distended, normoactive bowel sounds,  no masses , no hepatosplenomegaly  EXTREMITIES:  no cyanosis,clubbing or edema  SKIN:  No rash, erythema, or, ecchymoses, no jaundice  NEURO:  Alert, non-focal, no asterixis  PSYCH: Appropriate affect, oriented to place and time  RECTAL: Deferred      LABS Personally reviewed by me:                        10.6   11.95 )-----------( 255      ( 16 Mar 2023 06:00 )             32.8     Mean Cell Volume: 100.3 fl (03-16-23 @ 06:00)    03-16    135  |  102  |  25<H>  ----------------------------<  108<H>  4.5   |  23  |  0.89    Ca    8.7      16 Mar 2023 06:06  Phos  3.7     03-16  Mg     2.0     03-16                                    10.6   11.95 )-----------( 255      ( 16 Mar 2023 06:00 )             32.8                         11.7   14.79 )-----------( 271      ( 15 Mar 2023 06:25 )             35.8                         10.1   13.16 )-----------( 256      ( 14 Mar 2023 11:56 )             31.5                         9.5    11.69 )-----------( 224      ( 14 Mar 2023 06:15 )             29.7       Imaging personally reviewed by me:

## 2023-03-16 NOTE — PROGRESS NOTE ADULT - SUBJECTIVE AND OBJECTIVE BOX
CARDIOLOGY FOLLOW UP - Dr. Booker  DATE OF SERVICE: 3/16/23    CC  No CV complaints    REVIEW OF SYSTEMS:  CONSTITUTIONAL: No fever, weight loss, or fatigue  RESPIRATORY: No cough, wheezing, chills or hemoptysis; No Shortness of Breath  CARDIOVASCULAR: No chest pain, palpitations, passing out, dizziness, or leg swelling  GASTROINTESTINAL: No abdominal or epigastric pain. No nausea, vomiting, or hematemesis; No diarrhea or constipation. No melena or hematochezia.  VASCULAR: No edema     PHYSICAL EXAM:  T(C): 36.2 (03-16-23 @ 11:07), Max: 36.7 (03-15-23 @ 21:43)  HR: 84 (03-16-23 @ 11:07) (64 - 92)  BP: 101/62 (03-16-23 @ 11:07) (100/67 - 112/74)  RR: 18 (03-16-23 @ 11:07) (17 - 18)  SpO2: 94% (03-16-23 @ 11:07) (94% - 96%)  Wt(kg): --  I&O's Summary    15 Mar 2023 07:01  -  16 Mar 2023 07:00  --------------------------------------------------------  IN: 720 mL / OUT: 400 mL / NET: 320 mL        Appearance: Normal	  Cardiovascular: Normal S1 S2,RRR, No JVD, No murmurs  Respiratory: Lungs clear to auscultation b/l  Gastrointestinal:  Soft, Non-tender, + BS	  Extremities: Normal range of motion, No clubbing, cyanosis or edema      Home Medications:  acetaminophen 325 mg oral tablet: 3 tab(s) orally every 6 hours (07 Mar 2023 11:48)  apixaban 5 mg oral tablet: 1 tab(s) orally 2 times a day (07 Mar 2023 11:48)  metoprolol succinate 25 mg oral tablet, extended release: 1 tab(s) orally once a day (at bedtime) (24 Feb 2023 17:17)  Narcan 4 mg/0.1 mL nasal spray: nasal once a day, As Needed -3 minutes if no or minimal response.  . Naloxone spray 4 mg/0.1 ml intranasal, Spray 0.1 mL into one nostril. Repeat with second device into other nostril after 2-3 minutes if no or minimal response     (07 Mar 2023 11:56)  oxyCODONE 5 mg oral tablet: 0.5 tab(s) orally every 6 hours, As Needed (07 Mar 2023 11:56)  polyethylene glycol 3350 oral powder for reconstitution: 17 gram(s) orally once a day (07 Mar 2023 11:48)  rosuvastatin 40 mg oral tablet: 1 tab(s) orally once a day (at bedtime) (07 Mar 2023 11:48)      MEDICATIONS  (STANDING):  acetaminophen     Tablet .. 975 milliGRAM(s) Oral every 6 hours  apixaban 5 milliGRAM(s) Oral two times a day  chlorhexidine 2% Cloths 1 Application(s) Topical daily  dextrose 5% + sodium chloride 0.45%. 1000 milliLiter(s) (60 mL/Hr) IV Continuous <Continuous>  melatonin 5 milliGRAM(s) Oral at bedtime  metoprolol succinate ER 75 milliGRAM(s) Oral daily  multivitamin 1 Tablet(s) Oral daily  polyethylene glycol 3350 17 Gram(s) Oral daily  rosuvastatin 40 milliGRAM(s) Oral at bedtime  senna 2 Tablet(s) Oral at bedtime      TELEMETRY: NSR  pvcs 4-5 beats	    ECG:  	  RADIOLOGY:   DIAGNOSTIC TESTING:  [ ] Echocardiogram:  [ ]  Catheterization:  [ ] Stress Test:    OTHER: 	    LABS:	 	                            10.6   11.95 )-----------( 255      ( 16 Mar 2023 06:00 )             32.8     03-16    135  |  102  |  25<H>  ----------------------------<  108<H>  4.5   |  23  |  0.89    Ca    8.7      16 Mar 2023 06:06  Phos  3.7     03-16  Mg     2.0     03-16

## 2023-03-16 NOTE — PROGRESS NOTE ADULT - SUBJECTIVE AND OBJECTIVE BOX
Patient is a 89y old  Female who presents with a chief complaint of fall; pubic rami fracture (14 Mar 2023 17:33)      SUBJECTIVE / OVERNIGHT EVENTS:  17 beats WCT today   D/W Cards continue with monitor for now   d/c patient AM tomorrow       Events noted.  CONSTITUTIONAL: No fever,  or fatigue  RESPIRATORY: No cough, wheezing,  No shortness of breath  CARDIOVASCULAR: No chest pain, palpitations  GASTROINTESTINAL: No abdominal       MEDICATIONS  (STANDING):  acetaminophen     Tablet .. 975 milliGRAM(s) Oral every 6 hours  apixaban 5 milliGRAM(s) Oral two times a day  chlorhexidine 2% Cloths 1 Application(s) Topical daily  dextrose 5% + sodium chloride 0.45%. 1000 milliLiter(s) (60 mL/Hr) IV Continuous <Continuous>  metoprolol succinate ER 75 milliGRAM(s) Oral daily  multivitamin 1 Tablet(s) Oral daily  polyethylene glycol 3350 17 Gram(s) Oral daily  rosuvastatin 40 milliGRAM(s) Oral at bedtime  senna 2 Tablet(s) Oral at bedtime    MEDICATIONS  (PRN):      PHYSICAL EXAM:  GENERAL: NAD  NECK: Supple, No JVD  CHEST/LUNG: Clear to auscultation bilaterally; No wheezing.  HEART: Regular rate and rhythm; No murmurs, rubs, or gallops  ABDOMEN: Soft, Nontender, Nondistended; Bowel sounds present  EXTREMITIES:   No edema  NEUROLOGY: AAO                                            10.6   11.95 )-----------( 255      ( 16 Mar 2023 06:00 )             32.8               135|102|25<108  4.5|23|0.89  8.7,2.0,3.7  03-16 @ 06:06        CAPILLARY BLOOD GLUCOSE            RADIOLOGY & ADDITIONAL TESTS:    Imaging Personally Reviewed:    Consultant(s) Notes Reviewed:      Care Discussed with Consultants/Other Providers:    Francisco Cheng MD, CMD, FACP    [address] [place] [id number]  Office Tel: [phone]  Cell: [phone]

## 2023-03-17 NOTE — PROGRESS NOTE ADULT - ASSESSMENT
89 year old female from home AAO x3, with PMH of Afib on xarelto, CAD, HTN, HLD and history of right ankle fracture s/p repair 9 months ago, who presented to the ED s/p fall at home.     WBC of 15K on admission. Xray noted to show acute fractures of bilateral pubic rami. CT pelvis done showing acute fracture of superior and inferior pubic rami and right sacral ala. Hematoma in right lower pelvis. CXR reviewed; appears clear. Code stroke called in ED due to facial droop and right UE weakness. CT head, CTA brain and neck done with no acute abnormalities.       Acute  CVA , confirmed w MR 2/25, RUE hemiparesis, dysarthria and expressive aphasia  TTE w PFO, cardiology following  , On Eliquis 5   Dw pt's son in details    Hypoxia/Pleural effusion/Acute resp failure hypoxic    Likely  from fluid overload  IV Lasix  Dw cardio

## 2023-03-17 NOTE — CHART NOTE - NSCHARTNOTEFT_GEN_A_CORE
Patient medically cleared for discharge as per Dr. Cheng.   D/C paperwork completed and updated along with D/C meds as discussed with Dr. Cheng.   Dispo to acute rehab [facility] at 2pm per CM. Hemodynamically stable for discharge, labs reviewed.

## 2023-03-17 NOTE — PROGRESS NOTE ADULT - ASSESSMENT
Echo 4/6/21: Nml LV fxn EF 61%  Echo 2/27/23: Mild anteroseptal hypokinesis EF 45-50%, Mild diastolic dysfxn, Patent foramen ovale    A/P  89 year old female from home AAO x3, with PMH of Afib on xarelto, CAD, HTN, HLD and history of right ankle fracture s/p repair 9 months ago, who presented to the ED s/p fall at home, found to have b/l pubic rami fx with hematoma and acute infarct on MRI.    #Acute CVA  -Likely cause of fall given reported hx and no cardiac prodrome sx  -Neuro following  -Continue atorvastatin  -Prior echo with nml lv fxn  -Echo with PFO, anteroseptal hypokinesis  -LE duplex negative for DVT  -F/u neurology  -Likely xarelto failure  -Continue apixaban    #Afib  -Several episodes of nsvtach on tele up to 30 beats  -Overnight only 4-5 beats, otherwise sinus  -Replete electrolytes prn  -Metoprolol increased to 75mg qd, continue as ordered  -Continue apixaban  -Echo results as above    #CAD  -Continue atorvastatin    #HTN  -Continue metoprolol  -May continue to hold ace/arb for hypotension    #Pubic rami fx w/ hematoma  -Per ortho no surgical intervention  -Monitor h/h while on apixaban    #Leukocytosis  -ct chest noted  -Abx per primary, id   -id f/u    #ct chest with b/l effusions post ivf  -d/c ivf  -no clinical HF  -lasix as needed if dyspnea, orthopnea, worsening hypoxia      No CV CI to d/c  D/w family at bedside  Please call/text me with questions/concerns between 8am-4pm  [phone]

## 2023-03-17 NOTE — PROGRESS NOTE ADULT - ASSESSMENT
1. Stroke, Afib  - on Eliquis   - per neurology     2. oropharyngeal dysphagia & failure to thrive post stroke  - Options discussed with the patient and her family; will try conservative measures for now and if all else fails and she is unable to meet nutritional requirements on her current diet, can consider PEG.   - PO intake improving    3. HTN  - per cards     4. Pubic rami fx w/ hematoma  - Per ortho no surgical intervention  - Monitor h/h while on apixaban    5. Leukocytosis, trending down   - per ID    DC planning        Attending supervision statement: I have personally seen and examined the patient. I fully participated in the care of this patient. I have made amendments to the documentation where necessary, and agree with the history, physical exam, and plan as outlined by the ACP.    [facility]   Gastroenterology and Hepatology  [address] [place] [id number]  Office: [phone]  Cell: [phone] 1. Stroke, Afib  - on Eliquis   - per neurology     2. Oropharyngeal dysphagia & failure to thrive post stroke  - PO intake improving. No plans for PEG at this time.     3. HTN  - per cards     4. Pubic rami fx w/ hematoma  - Per ortho no surgical intervention  - Monitor h/h while on apixaban    5. Leukocytosis, trending down   - per ID    DC planning        Attending supervision statement: I have personally seen and examined the patient. I fully participated in the care of this patient. I have made amendments to the documentation where necessary, and agree with the history, physical exam, and plan as outlined by the ACP.    [facility]   Gastroenterology and Hepatology  [address] [place] [id number]  Office: [phone]  Cell: [phone]

## 2023-03-17 NOTE — PROGRESS NOTE ADULT - SUBJECTIVE AND OBJECTIVE BOX
Chief Complaint:  Patient is a 89y old  Female who presents with a chief complaint of fall; pubic rami fracture (16 Mar 2023 16:05)      Date of service 03-17-23 @ 09:40      Interval Events:   Patient seen and examined.   PO intake slowly improving. BM yesterday.   DC planning to rehab underway.      Hospital Medications:  acetaminophen     Tablet .. 975 milliGRAM(s) Oral every 6 hours  apixaban 5 milliGRAM(s) Oral two times a day  chlorhexidine 2% Cloths 1 Application(s) Topical daily  dextrose 5% + sodium chloride 0.45%. 1000 milliLiter(s) IV Continuous <Continuous>  metoprolol succinate ER 75 milliGRAM(s) Oral daily  multivitamin 1 Tablet(s) Oral daily  polyethylene glycol 3350 17 Gram(s) Oral daily  rosuvastatin 40 milliGRAM(s) Oral at bedtime  senna 2 Tablet(s) Oral at bedtime        Review of Systems:  General:  No wt loss, fevers, chills, night sweats, fatigue,   Eyes:  Good vision, no reported pain  ENT:  No sore throat, pain, runny nose, dysphagia  CV:  No pain, palpitations, hypo/hypertension  Resp:  No dyspnea, cough, tachypnea, wheezing  GI:  See HPI  :  No pain, bleeding, incontinence, nocturia  Muscle:  No pain, weakness  Neuro:  No weakness, tingling, memory problems  Psych:  No fatigue, insomnia, mood problems, depression  Endocrine:  No polyuria, polydipsia, cold/heat intolerance  Heme:  No petechiae, ecchymosis, easy bruisability  Integumentary:  No rash, edema    PHYSICAL EXAM:   Vital Signs:  Vital Signs Last 24 Hrs  T(C): 36.4 (17 Mar 2023 05:32), Max: 36.9 (16 Mar 2023 21:40)  T(F): 97.6 (17 Mar 2023 05:32), Max: 98.5 (16 Mar 2023 21:40)  HR: 81 (17 Mar 2023 05:32) (81 - 84)  BP: 117/76 (17 Mar 2023 05:32) (101/62 - 117/76)  BP(mean): --  RR: 18 (17 Mar 2023 05:32) (18 - 18)  SpO2: 94% (17 Mar 2023 05:32) (94% - 94%)    Parameters below as of 17 Mar 2023 05:32  Patient On (Oxygen Delivery Method): nasal cannula  O2 Flow (L/min): 4    Daily     Daily       PHYSICAL EXAM:     GENERAL:  Appears stated age, well-groomed, well-nourished, no distress  HEENT:  NC/AT,  conjunctivae anicteric, clear and pink,   NECK: supple, trachea midline  CHEST:  Full & symmetric excursion, no increased effort, breath sounds clear  HEART:  Regular rhythm, no JVD  ABDOMEN:  Soft, non-tender, non-distended, normoactive bowel sounds,  no masses , no hepatosplenomegaly  EXTREMITIES:  no cyanosis,clubbing or edema  SKIN:  No rash, erythema, or, ecchymoses, no jaundice  NEURO:  Alert, non-focal, no asterixis  PSYCH: Appropriate affect, oriented to place and time  RECTAL: Deferred      LABS Personally reviewed by me:                        10.6   11.95 )-----------( 255      ( 16 Mar 2023 06:00 )             32.8       03-16    135  |  102  |  25<H>  ----------------------------<  108<H>  4.5   |  23  |  0.89    Ca    8.7      16 Mar 2023 06:06  Phos  3.7     03-16  Mg     2.0     03-16                                    10.6   11.95 )-----------( 255      ( 16 Mar 2023 06:00 )             32.8                         11.7   14.79 )-----------( 271      ( 15 Mar 2023 06:25 )             35.8                         10.1   13.16 )-----------( 256      ( 14 Mar 2023 11:56 )             31.5       Imaging personally reviewed by me:

## 2023-03-17 NOTE — DISCHARGE NOTE NURSING/CASE MANAGEMENT/SOCIAL WORK - NSDCPEFALRISK_GEN_ALL_CORE
For information on Fall & Injury Prevention, visit: [email or web address] OR  [email or web address] OR  https://www.cdc.gov/steadi/patient.html

## 2023-03-17 NOTE — DISCHARGE NOTE NURSING/CASE MANAGEMENT/SOCIAL WORK - PATIENT PORTAL LINK FT
You can access the FollowMyHealth Patient Portal offered by [facility] by registering at the following website: [email or web address]. By joining [facility]’s FollowMyHealth portal, you will also be able to view your health information using other applications (apps) compatible with our system.

## 2023-03-17 NOTE — PROGRESS NOTE ADULT - SUBJECTIVE AND OBJECTIVE BOX
CARDIOLOGY FOLLOW UP - Dr. Booker  DATE OF SERVICE: 3/17/23    CC  No CV complaints    REVIEW OF SYSTEMS:  CONSTITUTIONAL: No fever, weight loss, or fatigue  RESPIRATORY: No cough, wheezing, chills or hemoptysis; No Shortness of Breath  CARDIOVASCULAR: No chest pain, palpitations, passing out, dizziness, or leg swelling  GASTROINTESTINAL: No abdominal or epigastric pain. No nausea, vomiting, or hematemesis; No diarrhea or constipation. No melena or hematochezia.  VASCULAR: No edema     PHYSICAL EXAM:  T(C): 36.6 (03-17-23 @ 11:01), Max: 36.9 (03-16-23 @ 21:40)  HR: 91 (03-17-23 @ 11:01) (81 - 91)  BP: 106/67 (03-17-23 @ 11:01) (104/66 - 117/76)  RR: 18 (03-17-23 @ 11:01) (18 - 18)  SpO2: 92% (03-17-23 @ 11:01) (92% - 94%)  Wt(kg): --  I&O's Summary    16 Mar 2023 07:01  -  17 Mar 2023 07:00  --------------------------------------------------------  IN: 1030 mL / OUT: 0 mL / NET: 1030 mL    17 Mar 2023 07:01  -  17 Mar 2023 15:29  --------------------------------------------------------  IN: 120 mL / OUT: 0 mL / NET: 120 mL        Appearance: Elderly female	  Cardiovascular: Normal S1 S2,RRR, No JVD, No murmurs  Respiratory: Lungs clear to auscultation b/l  Gastrointestinal:  Soft, Non-tender, + BS	  Extremities: Normal range of motion, No clubbing, cyanosis or edema      Home Medications:  acetaminophen 325 mg oral tablet: 3 tab(s) orally every 6 hours (07 Mar 2023 11:48)  apixaban 5 mg oral tablet: 1 tab(s) orally 2 times a day (07 Mar 2023 11:48)  metoprolol succinate 25 mg oral tablet, extended release: 3 tab(s) orally once a day (16 Mar 2023 18:43)  Multiple Vitamins oral tablet: 1 tab(s) orally once a day (17 Mar 2023 09:38)  Narcan 4 mg/0.1 mL nasal spray: nasal once a day, As Needed -3 minutes if no or minimal response.  . Naloxone spray 4 mg/0.1 ml intranasal, Spray 0.1 mL into one nostril. Repeat with second device into other nostril after 2-3 minutes if no or minimal response     (07 Mar 2023 11:56)  oxyCODONE 5 mg oral tablet: 0.5 tab(s) orally every 6 hours, As Needed (07 Mar 2023 11:56)  polyethylene glycol 3350 oral powder for reconstitution: 17 gram(s) orally once a day (07 Mar 2023 11:48)  rosuvastatin 40 mg oral tablet: 1 tab(s) orally once a day (at bedtime) (07 Mar 2023 11:48)      MEDICATIONS  (STANDING):  acetaminophen     Tablet .. 975 milliGRAM(s) Oral every 6 hours  apixaban 5 milliGRAM(s) Oral two times a day  chlorhexidine 2% Cloths 1 Application(s) Topical daily  dextrose 5% + sodium chloride 0.45%. 1000 milliLiter(s) (60 mL/Hr) IV Continuous <Continuous>  metoprolol succinate ER 75 milliGRAM(s) Oral daily  multivitamin 1 Tablet(s) Oral daily  polyethylene glycol 3350 17 Gram(s) Oral daily  rosuvastatin 40 milliGRAM(s) Oral at bedtime  senna 2 Tablet(s) Oral at bedtime      TELEMETRY: SR 60-90s	    ECG:  	  RADIOLOGY:   DIAGNOSTIC TESTING:  [ ] Echocardiogram:  [ ]  Catheterization:  [ ] Stress Test:    OTHER: 	    LABS:	 	                            10.6   11.95 )-----------( 255      ( 16 Mar 2023 06:00 )             32.8     03-16    135  |  102  |  25<H>  ----------------------------<  108<H>  4.5   |  23  |  0.89    Ca    8.7      16 Mar 2023 06:06  Phos  3.7     03-16  Mg     2.0     03-16

## 2023-03-17 NOTE — PROGRESS NOTE ADULT - SUBJECTIVE AND OBJECTIVE BOX
Patient is a 89y old  Female who presents with a chief complaint of fall; pubic rami fracture (17 Mar 2023 15:29)      SUBJECTIVE / OVERNIGHT EVENTS:    Events noted.  SOB  CXR: pleural effusion    MEDICATIONS  (STANDING):  acetaminophen     Tablet .. 975 milliGRAM(s) Oral every 6 hours  apixaban 5 milliGRAM(s) Oral two times a day  chlorhexidine 2% Cloths 1 Application(s) Topical daily  dextrose 5% + sodium chloride 0.45%. 1000 milliLiter(s) (60 mL/Hr) IV Continuous <Continuous>  metoprolol succinate ER 75 milliGRAM(s) Oral daily  multivitamin 1 Tablet(s) Oral daily  polyethylene glycol 3350 17 Gram(s) Oral daily  rosuvastatin 40 milliGRAM(s) Oral at bedtime  senna 2 Tablet(s) Oral at bedtime  traZODone 25 milliGRAM(s) Oral at bedtime    MEDICATIONS  (PRN):        CAPILLARY BLOOD GLUCOSE        I&O's Summary    16 Mar 2023 07:01  -  17 Mar 2023 07:00  --------------------------------------------------------  IN: 1030 mL / OUT: 0 mL / NET: 1030 mL    17 Mar 2023 07:01  -  17 Mar 2023 23:37  --------------------------------------------------------  IN: 120 mL / OUT: 0 mL / NET: 120 mL        T(C): 36.5 (03-17-23 @ 20:53), Max: 36.6 (03-17-23 @ 11:01)  HR: 84 (03-17-23 @ 20:53) (81 - 91)  BP: 102/61 (03-17-23 @ 20:53) (102/61 - 117/76)  RR: 18 (03-17-23 @ 20:53) (18 - 18)  SpO2: 90% (03-17-23 @ 20:53) (90% - 94%)    PHYSICAL EXAM:  GENERAL: NAD  NECK: Supple, No JVD  CHEST/LUNG: Clear to auscultation bilaterally; Bl basilar crackles  HEART: Regular rate and rhythm; No murmurs, rubs, or gallops  ABDOMEN: Soft, Nontender, Nondistended; Bowel sounds present  EXTREMITIES:   No edema  NEUROLOGY: AAO       LABS:                        10.6   11.95 )-----------( 255      ( 16 Mar 2023 06:00 )             32.8     03-16    135  |  102  |  25<H>  ----------------------------<  108<H>  4.5   |  23  |  0.89    Ca    8.7      16 Mar 2023 06:06  Phos  3.7     03-16  Mg     2.0     03-16              CAPILLARY BLOOD GLUCOSE            RADIOLOGY & ADDITIONAL TESTS:    Imaging Personally Reviewed:    Consultant(s) Notes Reviewed:      Care Discussed with Consultants/Other Providers:    Francisco Cheng MD, CMD, FACP    [address] [place] [address]  Office Tel: [phone]  Cell: [phone]

## 2023-03-18 NOTE — CHART NOTE - NSCHARTNOTEFT_GEN_A_CORE
Called by RN this AM for transition from NSR into Aflutter on tele, sustaining HR 120s. Patient on Eliquis.   D/w Dr. Booker, if HR elevated, recommending Dig load (250microgram IV x 2 doses Q 8 followed by 250 microgram PO Q 48).   At 12pm today, confirmed with tele HR remains elevated in 130s up to 160s on occasion.   Dig ordered as above.   ID following, recommending CT chest non con to eval for PNA, given new pleural effusions on CXR yesterday - ordered.   Dr. Cheng notified.

## 2023-03-18 NOTE — PROGRESS NOTE ADULT - SUBJECTIVE AND OBJECTIVE BOX
INFECTIOUS DISEASE FOLLOW UP NOTE:    Interval History/ROS: Patient is a 89y old  Female who presents with a chief complaint of fall; pubic rami fracture (18 Mar 2023 08:04)      Overnight events:    REVIEW OF SYSTEMS:  CONSTITUTIONAL: No fever or chills  HEENT: No sore throat  RESPIRATORY: No cough, no shortness of breath  CARDIOVASCULAR: No chest pain or palpitations  GASTROINTESTINAL: No abdominal or epigastric pain  GENITOURINARY: No dysuria  NEUROLOGICAL: No headache/dizziness  MSK: No joint pain, erythema, or swelling; no back pain  SKIN: No itching, rashes  All other ROS negative except noted above    Prior hospital charts reviewed [Yes]  Primary team notes reviewed [Yes]  Other consultant notes reviewed [Yes]    Allergies:  No Known Allergies      ANTIMICROBIALS:       OTHER MEDS: MEDICATIONS  (STANDING):  acetaminophen     Tablet .. 975 every 6 hours  apixaban 5 two times a day  digoxin  Injectable 250 every 8 hours  metoprolol succinate ER 75 daily  polyethylene glycol 3350 17 daily  rosuvastatin 40 at bedtime  senna 2 at bedtime  traZODone 25 at bedtime      Vital Signs Last 24 Hrs  T(F): 97.9 (03-18-23 @ 10:10), Max: 98.5 (03-16-23 @ 21:40)    Vital Signs Last 24 Hrs  HR: 120 (03-18-23 @ 10:10) (84 - 120)  BP: 100/60 (03-18-23 @ 10:10) (100/60 - 102/61)  RR: 18 (03-18-23 @ 11:24)  SpO2: 94% (03-18-23 @ 11:24) (90% - 94%)  Wt(kg): --    EXAM:  GENERAL: c/o more difficulty breathing  HEAD: No head lesions  EYES: Conjunctiva pink and cornea white  ENT: Normal external ears and nose, no discharges; moist mucous membranes  NECK: Supple, nontender to palpation  CHEST/LUNG: Diminished to bases  HEART: S1 S2  ABDOMEN: Soft, nontender, nondistended; normoactive bowel sounds  EXTREMITIES: No clubbing, cyanosis, or petal edema  NERVOUS SYSTEM: Alert and oriented to person, time, place and situation, speech clear. No focal deficits   MSK: No joint erythema, swelling or pain  SKIN: No rashes or lesions, no superficial thrombophlebitis    Labs:            WBC Trend:  WBC Count: 11.95 (03-16-23 @ 06:00)  WBC Count: 14.79 (03-15-23 @ 06:25)  WBC Count: 13.16 (03-14-23 @ 11:56)  WBC Count: 11.69 (03-14-23 @ 06:15)      Creatine Trend:  Creatinine, Serum: 0.89 (03-16)  Creatinine, Serum: 0.77 (03-15)  Creatinine, Serum: 0.75 (03-13)  Creatinine, Serum: 0.71 (03-12)      Liver Biochemical Testing Trend:  Alanine Aminotransferase (ALT/SGPT): 35 (03-12)  Alanine Aminotransferase (ALT/SGPT): 33 (02-25)  Alanine Aminotransferase (ALT/SGPT): 29 (02-25)  Alanine Aminotransferase (ALT/SGPT): 34 (02-24)  Alanine Aminotransferase (ALT/SGPT): 79 *H* (03-28)  Aspartate Aminotransferase (AST/SGOT): 32 (03-12-23 @ 06:25)  Aspartate Aminotransferase (AST/SGOT): 61 (02-25-23 @ 07:29)  Aspartate Aminotransferase (AST/SGOT): 50 (02-25-23 @ 00:08)  Aspartate Aminotransferase (AST/SGOT): 38 (02-24-23 @ 10:57)  Aspartate Aminotransferase (AST/SGOT): 56 (03-28-22 @ 19:16)  Bilirubin Total, Serum: 0.2 (03-12)  Bilirubin Total, Serum: 0.6 (02-25)  Bilirubin Total, Serum: 0.7 (02-25)  Bilirubin Total, Serum: 0.5 (02-24)  Bilirubin Total, Serum: 0.4 (03-28)      Trend LDH          MICROBIOLOGY:    MRSA PCR Result.: NotDetec (03-09-23 @ 19:15)  MRSA PCR Result.: NotDetec (02-26-23 @ 06:02)      Culture - Blood (collected 08 Mar 2023 20:57)  Source: .Blood Blood  Final Report:    No Growth Final    Culture - Blood (collected 08 Mar 2023 20:57)  Source: .Blood Blood  Final Report:    No Growth Final    Culture - Urine (collected 28 Mar 2022 22:36)  Source: Clean Catch Clean Catch (Midstream)  Final Report:    <10,000 CFU/mL Normal Urogenital Gilma    Culture - Urine (collected 09 May 2021 17:34)  Source: .Urine Clean Catch (Midstream)  Final Report:    No growth    Culture - Urine (collected 07 May 2021 00:35)  Source: .Urine Clean Catch (Midstream)  Final Report:    <10,000 CFU/mL Normal Urogenital Gilma    Culture - Urine (collected 05 Apr 2021 21:38)  Source: .Urine Clean Catch (Midstream)  Final Report:    No growth                          COVID-19 PCR: NotDetec (03-16-23 @ 19:05)  COVID-19 PCR: NotDetec (03-12-23 @ 18:36)  COVID-19 PCR: NotDetec (03-06-23 @ 11:58)  COVID-19 PCR: NotDetec (03-02-23 @ 12:47)                        RADIOLOGY:  imaging below personally reviewed

## 2023-03-18 NOTE — PROGRESS NOTE ADULT - NS ATTEND AMEND GEN_ALL_CORE FT
89F with sob, pleural effusions  -effusions worse on cxr  -check CT chest w/o contrast   -hold off on abx pending CT    Gamaliel Duke  Attending Physician   Division of Infectious Disease  Office #[phone]  Available on Microsoft Teams also  After 5pm/weekend or no response, call #[phone]    Please call the ID service [phone] with questions or concerns over the weekend.

## 2023-03-18 NOTE — PROVIDER CONTACT NOTE (OTHER) - SITUATION
Pt converted to AFlutter on tele this am. Pt continues to sustain high HR up to 150-160s sustained. First IVP digoxin dose given today @1302.

## 2023-03-18 NOTE — PROGRESS NOTE ADULT - ASSESSMENT
89 year old female from home AAO x3, with PMH of Afib on xarelto, CAD, HTN, HLD and history of right ankle fracture s/p repair 9 months ago, who presented to the ED s/p fall at home on 2/24.  Xray noted to show acute fractures of bilateral pubic rami. CT pelvis done showing acute fracture of superior and inferior pubic rami and right sacral ala. Hematoma in right lower pelvis. . Code stroke called in ED due to facial droop and right UE weakness. 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 TTE w PFO, Pt planned for d/c to acute rehab 3/7, however cancelled due to elevated WBC and once again plans were to transfer to rehab 3/17 but cancelled due to b/l Pul. effusions.     -breathing more labored today  -currently being diuresed  -afebrile      IMPRESSION  More difficulty breathing today even after significant amounts of output from diuresing and now reliance of O2  Recommend CT Chest non con to evaluate for underlying disease process and infectious etiology  Continue to monitor off abx as pt stable for now  no diarrhea.   Trend cbc  Monitor for fevers  Plan d/w Dr. Duke

## 2023-03-18 NOTE — PROGRESS NOTE ADULT - SUBJECTIVE AND OBJECTIVE BOX
Patient is a 89y old  Female who presents with a chief complaint of fall; pubic rami fracture (18 Mar 2023 20:37)      SUBJECTIVE / OVERNIGHT EVENTS:    Events noted.  CONSTITUTIONAL: No fever,  or fatigue  RESPIRATORY: On supp O2  CARDIOVASCULAR: No chest pain, palpitations, dizziness, or leg swelling  GASTROINTESTINAL: No abdominal or epigastric pain. No nausea, vomiting.  NEUROLOGICAL: No headache    MEDICATIONS  (STANDING):  acetaminophen     Tablet .. 975 milliGRAM(s) Oral every 6 hours  apixaban 5 milliGRAM(s) Oral two times a day  chlorhexidine 2% Cloths 1 Application(s) Topical daily  dextrose 5% + sodium chloride 0.45%. 1000 milliLiter(s) (60 mL/Hr) IV Continuous <Continuous>  digoxin     Tablet 250 MICROGram(s) Oral every other day  furosemide   Injectable 20 milliGRAM(s) IV Push every 12 hours  metoprolol succinate ER 75 milliGRAM(s) Oral daily  multivitamin 1 Tablet(s) Oral daily  polyethylene glycol 3350 17 Gram(s) Oral daily  rosuvastatin 40 milliGRAM(s) Oral at bedtime  senna 2 Tablet(s) Oral at bedtime  traZODone 25 milliGRAM(s) Oral at bedtime    MEDICATIONS  (PRN):        CAPILLARY BLOOD GLUCOSE        I&O's Summary    17 Mar 2023 07:01  -  18 Mar 2023 07:00  --------------------------------------------------------  IN: 120 mL / OUT: 200 mL / NET: -80 mL    18 Mar 2023 07:01  -  19 Mar 2023 00:09  --------------------------------------------------------  IN: 120 mL / OUT: 0 mL / NET: 120 mL        T(C): 36.8 (03-18-23 @ 21:26), Max: 36.9 (03-18-23 @ 17:15)  HR: 130 (03-18-23 @ 21:38) (88 - 130)  BP: 104/73 (03-18-23 @ 21:26) (100/60 - 105/75)  RR: 18 (03-18-23 @ 21:26) (18 - 18)  SpO2: 92% (03-18-23 @ 21:26) (92% - 95%)    PHYSICAL EXAM:    NECK: Supple, No JVD  CHEST/LUNG: Clear to auscultation bilaterally; Bl basilar crackles  HEART: Regular rate and rhythm; No murmurs, rubs, or gallops  ABDOMEN: Soft, Nontender, Nondistended; Bowel sounds present  EXTREMITIES:   No edema  NEUROLOGY: AAO       LABS:                  CAPILLARY BLOOD GLUCOSE            RADIOLOGY & ADDITIONAL TESTS:    Imaging Personally Reviewed:    Consultant(s) Notes Reviewed:      Care Discussed with Consultants/Other Providers:    Francisco Cheng MD, CMD, FACP    [address] [place] [address]  Office Tel: [phone]  Cell: [phone]

## 2023-03-18 NOTE — PROGRESS NOTE ADULT - ASSESSMENT
89 year old female from home AAO x3, with PMH of Afib on xarelto, CAD, HTN, HLD and history of right ankle fracture s/p repair 9 months ago, who presented to the ED s/p fall at home.     WBC of 15K on admission. Xray noted to show acute fractures of bilateral pubic rami. CT pelvis done showing acute fracture of superior and inferior pubic rami and right sacral ala. Hematoma in right lower pelvis. CXR reviewed; appears clear. Code stroke called in ED due to facial droop and right UE weakness. CT head, CTA brain and neck done with no acute abnormalities.       Acute  CVA , confirmed w MR 2/25, RUE hemiparesis, dysarthria and expressive aphasia  TTE w PFO, cardiology following  , On Eliquis 5       Hypoxia/Pleural effusion/Acute resp failure hypoxic    Likely  from fluid overload  PO Lasix  Cardio/ID f/up noted  CT chest

## 2023-03-18 NOTE — PROGRESS NOTE ADULT - SUBJECTIVE AND OBJECTIVE BOX
CARDIOLOGY FOLLOW UP - Dr. Booker  Date of Service: 3/18/2023  CC: pt feeling better, slept well    Review of Systems:  Constitutional: No fever, weight loss, or fatigue  Respiratory: No cough, wheezing, or hemoptysis, no shortness of breath  Cardiovascular: No chest pain, palpitations, passing out, dizziness, or leg swelling  Gastrointestinal: No abd or epigastric pain. No nausea, vomiting, or hematemesis; no diarrhea or consiptaiton, no melena or hematochezia  Vascular: No edema     TELEMETRY:    PHYSICAL EXAM:  T(C): 36.6 (03-18-23 @ 04:10), Max: 36.6 (03-17-23 @ 11:01)  HR: 88 (03-18-23 @ 04:10) (84 - 91)  BP: 100/68 (03-18-23 @ 04:10) (100/68 - 106/67)  RR: 18 (03-18-23 @ 04:10) (18 - 18)  SpO2: 94% (03-18-23 @ 04:10) (90% - 94%)  Wt(kg): --  I&O's Summary    17 Mar 2023 07:01  -  18 Mar 2023 07:00  --------------------------------------------------------  IN: 120 mL / OUT: 200 mL / NET: -80 mL        Appearance: Normal	  Cardiovascular: Normal S1 S2,RRR, No JVD, No murmurs  Respiratory: Lungs clear to auscultation	  Gastrointestinal:  Soft, Non-tender, + BS	  Extremities: Normal range of motion, No clubbing, cyanosis or edema  Vascular: Peripheral pulses palpable 2+ bilaterally       Home Medications:  acetaminophen 325 mg oral tablet: 3 tab(s) orally every 6 hours (07 Mar 2023 11:48)  apixaban 5 mg oral tablet: 1 tab(s) orally 2 times a day (07 Mar 2023 11:48)  metoprolol succinate 25 mg oral tablet, extended release: 3 tab(s) orally once a day (16 Mar 2023 18:43)  Multiple Vitamins oral tablet: 1 tab(s) orally once a day (17 Mar 2023 09:38)  Narcan 4 mg/0.1 mL nasal spray: nasal once a day, As Needed -3 minutes if no or minimal response.  . Naloxone spray 4 mg/0.1 ml intranasal, Spray 0.1 mL into one nostril. Repeat with second device into other nostril after 2-3 minutes if no or minimal response     (07 Mar 2023 11:56)  oxyCODONE 5 mg oral tablet: 0.5 tab(s) orally every 6 hours, As Needed (07 Mar 2023 11:56)  polyethylene glycol 3350 oral powder for reconstitution: 17 gram(s) orally once a day (07 Mar 2023 11:48)  rosuvastatin 40 mg oral tablet: 1 tab(s) orally once a day (at bedtime) (07 Mar 2023 11:48)        MEDICATIONS  (STANDING):  acetaminophen     Tablet .. 975 milliGRAM(s) Oral every 6 hours  apixaban 5 milliGRAM(s) Oral two times a day  chlorhexidine 2% Cloths 1 Application(s) Topical daily  dextrose 5% + sodium chloride 0.45%. 1000 milliLiter(s) (60 mL/Hr) IV Continuous <Continuous>  metoprolol succinate ER 75 milliGRAM(s) Oral daily  multivitamin 1 Tablet(s) Oral daily  polyethylene glycol 3350 17 Gram(s) Oral daily  rosuvastatin 40 milliGRAM(s) Oral at bedtime  senna 2 Tablet(s) Oral at bedtime  traZODone 25 milliGRAM(s) Oral at bedtime        EKG:  RADIOLOGY:  DIAGNOSTIC TESTING:  [ ] Echocardiogram:  [ ] Catherterization:  [ ] Stress Test:  OTHER:     LABS:	 	                  CARDIAC MARKERS:

## 2023-03-18 NOTE — PROGRESS NOTE ADULT - SUBJECTIVE AND OBJECTIVE BOX
Chief Complaint:  Patient is a 89y old  Female who presents with a chief complaint of fall; pubic rami fracture (18 Mar 2023 13:41)      Date of service 23 @ 20:37      Interval Events:   no events  Hospital Medications:  acetaminophen     Tablet .. 975 milliGRAM(s) Oral every 6 hours  apixaban 5 milliGRAM(s) Oral two times a day  chlorhexidine 2% Cloths 1 Application(s) Topical daily  dextrose 5% + sodium chloride 0.45%. 1000 milliLiter(s) IV Continuous <Continuous>  digoxin  Injectable 250 MICROGram(s) IV Push every 8 hours  furosemide   Injectable 20 milliGRAM(s) IV Push every 12 hours  metoprolol succinate ER 75 milliGRAM(s) Oral daily  multivitamin 1 Tablet(s) Oral daily  polyethylene glycol 3350 17 Gram(s) Oral daily  rosuvastatin 40 milliGRAM(s) Oral at bedtime  senna 2 Tablet(s) Oral at bedtime  traZODone 25 milliGRAM(s) Oral at bedtime        Review of Systems:  General:  No wt loss, fevers, chills, night sweats, fatigue,   Eyes:  Good vision, no reported pain  ENT:  No sore throat, pain, runny nose, dysphagia  CV:  No pain, palpitations, hypo/hypertension  Resp:  No dyspnea, cough, tachypnea, wheezing  GI:  See HPI  :  No pain, bleeding, incontinence, nocturia  Muscle:  No pain, weakness  Neuro:  No weakness, tingling, memory problems  Psych:  No fatigue, insomnia, mood problems, depression  Endocrine:  No polyuria, polydipsia, cold/heat intolerance  Heme:  No petechiae, ecchymosis, easy bruisability  Integumentary:  No rash, edema    PHYSICAL EXAM:   Vital Signs:  Vital Signs Last 24 Hrs  T(C): 36.9 (18 Mar 2023 17:15), Max: 36.9 (18 Mar 2023 17:15)  T(F): 98.4 (18 Mar 2023 17:15), Max: 98.4 (18 Mar 2023 17:15)  HR: 114 (18 Mar 2023 17:15) (84 - 120)  BP: 105/75 (18 Mar 2023 17:15) (100/60 - 105/75)  BP(mean): --  RR: 18 (18 Mar 2023 18:34) (18 - 18)  SpO2: 94% (18 Mar 2023 18:34) (90% - 95%)    Parameters below as of 18 Mar 2023 18:34  Patient On (Oxygen Delivery Method): nasal cannula  O2 Flow (L/min): 3    Daily     Daily Weight in k.2 (18 Mar 2023 10:18)      PHYSICAL EXAM:     GENERAL:  Appears stated age, well-groomed, well-nourished, no distress  HEENT:  NC/AT,  conjunctivae anicteric, clear and pink,   NECK: supple, trachea midline  CHEST:  Full & symmetric excursion, no increased effort, breath sounds clear  HEART:  Regular rhythm, no JVD  ABDOMEN:  Soft, non-tender, non-distended, normoactive bowel sounds,  no masses , no hepatosplenomegaly  EXTREMITIES:  no cyanosis,clubbing or edema  SKIN:  No rash, erythema, or, ecchymoses, no jaundice  NEURO:  Alert, non-focal, no asterixis  PSYCH: Appropriate affect, oriented to place and time  RECTAL: Deferred      LABS Personally reviewed by me:                                          10.6   11.95 )-----------( 255      ( 16 Mar 2023 06:00 )             32.8       Imaging personally reviewed by me:

## 2023-03-18 NOTE — PROGRESS NOTE ADULT - ASSESSMENT
Echo 4/6/21: Nml LV fxn EF 61%  Echo 2/27/23: Mild anteroseptal hypokinesis EF 45-50%, Mild diastolic dysfxn, Patent foramen ovale    A/P  89 year old female from home AAO x3, with PMH of Afib on xarelto, CAD, HTN, HLD and history of right ankle fracture s/p repair 9 months ago, who presented to the ED s/p fall at home, found to have b/l pubic rami fx with hematoma and acute infarct on MRI.    #HFpEF  -s/p IV lasix w improvement  -please repeat SMA  -start oral lasix 20mg PO daily    #Acute CVA  -Likely cause of fall given reported hx and no cardiac prodrome sx  -Neuro following  -Continue atorvastatin  -Prior echo with nml lv fxn  -Echo with PFO, anteroseptal hypokinesis  -LE duplex negative for DVT  -F/u neurology  -Likely xarelto failure  -Continue apixaban    #Afib  -no recent tele events  -cont metoprolol  -Continue apixaban  -Echo results as above    #CAD  -Continue atorvastatin    #HTN  -Continue metoprolol  -May continue to hold ace/arb for hypotension    #Pubic rami fx w/ hematoma  -Per ortho no surgical intervention  -Monitor h/h while on apixaban    #Leukocytosis  -ct chest noted  -Abx per primary, id   -id f/u    #ct chest with b/l effusions post ivf  -d/c ivf  -no clinical HF  -lasix as needed if dyspnea, orthopnea, worsening hypoxia      35 minutes spent on total encounter; more than 50% of the visit was spent counseling and/or coordinating care by the attending physician.

## 2023-03-18 NOTE — PROGRESS NOTE ADULT - ASSESSMENT
1. Stroke, Afib  - on Eliquis   - per neurology     2. Oropharyngeal dysphagia & failure to thrive post stroke  - PO intake improving. No plans for PEG at this time.     3. HTN  - per cards     4. Pubic rami fx w/ hematoma  - Per ortho no surgical intervention  - Monitor h/h while on apixaban    5. Leukocytosis, trending down   - per ID    DC planning        Attending supervision statement: I have personally seen and examined the patient. I fully participated in the care of this patient. I have made amendments to the documentation where necessary, and agree with the history, physical exam, and plan as outlined by the ACP.    [facility]   Gastroenterology and Hepatology  [address] [place] [id number]  Office: [phone]  Cell: [phone]

## 2023-03-19 NOTE — PROGRESS NOTE ADULT - ASSESSMENT
1. Stroke, Afib  - on Eliquis   - per neurology     2. Oropharyngeal dysphagia & failure to thrive post stroke  - PO intake improving. eating well today. No plans for PEG at this time.     3. HTN  - per cards     4. Pubic rami fx w/ hematoma  - Per ortho no surgical intervention  - Monitor h/h while on apixaban    5. Leukocytosis, trending down   - per ID    DC planning        Attending supervision statement: I have personally seen and examined the patient. I fully participated in the care of this patient. I have made amendments to the documentation where necessary, and agree with the history, physical exam, and plan as outlined by the ACP.    [facility]   Gastroenterology and Hepatology  [address] [place] [id number]  Office: [phone]  Cell: [phone]

## 2023-03-19 NOTE — PROGRESS NOTE ADULT - SUBJECTIVE AND OBJECTIVE BOX
Chief Complaint:  Patient is a 89y old  Female who presents with a chief complaint of fall; pubic rami fracture (19 Mar 2023 08:44)      Date of service 23 @ 10:10      Interval Events:   no gi events  eating well    Hospital Medications:  acetaminophen     Tablet .. 975 milliGRAM(s) Oral every 6 hours  apixaban 5 milliGRAM(s) Oral two times a day  chlorhexidine 2% Cloths 1 Application(s) Topical daily  dextrose 5% + sodium chloride 0.45%. 1000 milliLiter(s) IV Continuous <Continuous>  digoxin     Tablet 250 MICROGram(s) Oral every other day  furosemide   Injectable 20 milliGRAM(s) IV Push every 12 hours  metoprolol succinate ER 75 milliGRAM(s) Oral daily  multivitamin 1 Tablet(s) Oral daily  polyethylene glycol 3350 17 Gram(s) Oral daily  rosuvastatin 40 milliGRAM(s) Oral at bedtime  senna 2 Tablet(s) Oral at bedtime  traZODone 25 milliGRAM(s) Oral at bedtime        Review of Systems:  General:  No wt loss, fevers, chills, night sweats, fatigue,   Eyes:  Good vision, no reported pain  ENT:  No sore throat, pain, runny nose, dysphagia  CV:  No pain, palpitations, hypo/hypertension  Resp:  No dyspnea, cough, tachypnea, wheezing  GI:  See HPI  :  No pain, bleeding, incontinence, nocturia  Muscle:  No pain, weakness  Neuro:  No weakness, tingling, memory problems  Psych:  No fatigue, insomnia, mood problems, depression  Endocrine:  No polyuria, polydipsia, cold/heat intolerance  Heme:  No petechiae, ecchymosis, easy bruisability  Integumentary:  No rash, edema    PHYSICAL EXAM:   Vital Signs:  Vital Signs Last 24 Hrs  T(C): 36.6 (19 Mar 2023 04:28), Max: 36.9 (18 Mar 2023 17:15)  T(F): 97.9 (19 Mar 2023 04:28), Max: 98.4 (18 Mar 2023 17:15)  HR: 95 (19 Mar 2023 04:28) (95 - 130)  BP: 116/75 (19 Mar 2023 04:28) (104/73 - 116/75)  BP(mean): --  RR: 18 (19 Mar 2023 04:28) (18 - 18)  SpO2: 94% (19 Mar 2023 04:28) (92% - 95%)    Parameters below as of 19 Mar 2023 04:28  Patient On (Oxygen Delivery Method): nasal cannula  O2 Flow (L/min): 3    Daily     Daily Weight in k.2 (18 Mar 2023 10:18)      PHYSICAL EXAM:     GENERAL:  Appears stated age, well-groomed, well-nourished, no distress  HEENT:  NC/AT,  conjunctivae anicteric, clear and pink,   NECK: supple, trachea midline  CHEST:  Full & symmetric excursion, no increased effort, breath sounds clear  HEART:  Regular rhythm, no JVD  ABDOMEN:  Soft, non-tender, non-distended, normoactive bowel sounds,  no masses , no hepatosplenomegaly  EXTREMITIES:  no cyanosis,clubbing or edema  SKIN:  No rash, erythema, or, ecchymoses, no jaundice  NEURO:  Alert, non-focal, no asterixis  PSYCH: Appropriate affect, oriented to place and time  RECTAL: Deferred      LABS Personally reviewed by me:                        . )-----------( 225      ( 19 Mar 2023 06:11 )             35.2     Mean Cell Volume: 99.4 fl (- @ 06:11)        137  |  102  |  26<H>  ----------------------------<  103<H>  4.5   |  25  |  0.90    Ca    8.8      19 Mar 2023 06:11                                    11. )-----------( 225      ( 19 Mar 2023 06:11 )             35.2       Imaging personally reviewed by me:

## 2023-03-19 NOTE — PROGRESS NOTE ADULT - SUBJECTIVE AND OBJECTIVE BOX
CARDIOLOGY FOLLOW UP - Dr. Booker  Date of Service: 3/19/2023  CC: no events, feeling better, converted back into NSR    Review of Systems:  Constitutional: No fever, weight loss, or fatigue  Respiratory: No cough, wheezing, or hemoptysis, no shortness of breath  Cardiovascular: No chest pain, palpitations, passing out, dizziness, or leg swelling  Gastrointestinal: No abd or epigastric pain. No nausea, vomiting, or hematemesis; no diarrhea or consiptaiton, no melena or hematochezia  Vascular: No edema     TELEMETRY:    PHYSICAL EXAM:  T(C): 36.6 (03-19-23 @ 04:28), Max: 36.9 (03-18-23 @ 17:15)  HR: 95 (03-19-23 @ 04:28) (95 - 130)  BP: 116/75 (03-19-23 @ 04:28) (100/60 - 116/75)  RR: 18 (03-19-23 @ 04:28) (18 - 18)  SpO2: 94% (03-19-23 @ 04:28) (92% - 95%)  Wt(kg): --  I&O's Summary    18 Mar 2023 07:01  -  19 Mar 2023 07:00  --------------------------------------------------------  IN: 120 mL / OUT: 900 mL / NET: -780 mL        Appearance: Normal	  Cardiovascular: Normal S1 S2,RRR, No JVD, No murmurs  Respiratory: Lungs clear to auscultation	  Gastrointestinal:  Soft, Non-tender, + BS	  Extremities: Normal range of motion, No clubbing, cyanosis or edema  Vascular: Peripheral pulses palpable 2+ bilaterally       Home Medications:  acetaminophen 325 mg oral tablet: 3 tab(s) orally every 6 hours (07 Mar 2023 11:48)  apixaban 5 mg oral tablet: 1 tab(s) orally 2 times a day (07 Mar 2023 11:48)  metoprolol succinate 25 mg oral tablet, extended release: 3 tab(s) orally once a day (16 Mar 2023 18:43)  Multiple Vitamins oral tablet: 1 tab(s) orally once a day (17 Mar 2023 09:38)  Narcan 4 mg/0.1 mL nasal spray: nasal once a day, As Needed -3 minutes if no or minimal response.  . Naloxone spray 4 mg/0.1 ml intranasal, Spray 0.1 mL into one nostril. Repeat with second device into other nostril after 2-3 minutes if no or minimal response     (07 Mar 2023 11:56)  oxyCODONE 5 mg oral tablet: 0.5 tab(s) orally every 6 hours, As Needed (07 Mar 2023 11:56)  polyethylene glycol 3350 oral powder for reconstitution: 17 gram(s) orally once a day (07 Mar 2023 11:48)  rosuvastatin 40 mg oral tablet: 1 tab(s) orally once a day (at bedtime) (07 Mar 2023 11:48)        MEDICATIONS  (STANDING):  acetaminophen     Tablet .. 975 milliGRAM(s) Oral every 6 hours  apixaban 5 milliGRAM(s) Oral two times a day  chlorhexidine 2% Cloths 1 Application(s) Topical daily  dextrose 5% + sodium chloride 0.45%. 1000 milliLiter(s) (60 mL/Hr) IV Continuous <Continuous>  digoxin     Tablet 250 MICROGram(s) Oral every other day  furosemide   Injectable 20 milliGRAM(s) IV Push every 12 hours  metoprolol succinate ER 75 milliGRAM(s) Oral daily  multivitamin 1 Tablet(s) Oral daily  polyethylene glycol 3350 17 Gram(s) Oral daily  rosuvastatin 40 milliGRAM(s) Oral at bedtime  senna 2 Tablet(s) Oral at bedtime  traZODone 25 milliGRAM(s) Oral at bedtime        EKG:  RADIOLOGY:  DIAGNOSTIC TESTING:  [ ] Echocardiogram:  [ ] Catherterization:  [ ] Stress Test:  OTHER:     LABS:	 	                          11.5   11.91 )-----------( 225      ( 19 Mar 2023 06:11 )             35.2     03-19    137  |  102  |  26<H>  ----------------------------<  103<H>  4.5   |  25  |  0.90    Ca    8.8      19 Mar 2023 06:11            CARDIAC MARKERS:

## 2023-03-19 NOTE — PROGRESS NOTE ADULT - SUBJECTIVE AND OBJECTIVE BOX
Patient is a 89y old  Female who presents with a chief complaint of fall; pubic rami fracture (18 Mar 2023 20:37)      SUBJECTIVE / OVERNIGHT EVENTS:    Events noted.  CONSTITUTIONAL: No fever,  or fatigue  RESPIRATORY: On supp O2  CARDIOVASCULAR: No chest pain, palpitations, dizziness, or leg swelling  GASTROINTESTINAL: No abdominal or epigastric pain. No nausea, vomiting.  NEUROLOGICAL: No headache    MEDICATIONS  (STANDING):  acetaminophen     Tablet .. 975 milliGRAM(s) Oral every 6 hours  apixaban 5 milliGRAM(s) Oral two times a day  chlorhexidine 2% Cloths 1 Application(s) Topical daily  dextrose 5% + sodium chloride 0.45%. 1000 milliLiter(s) (60 mL/Hr) IV Continuous <Continuous>  digoxin     Tablet 250 MICROGram(s) Oral every other day  furosemide   Injectable 20 milliGRAM(s) IV Push every 12 hours  metoprolol succinate ER 75 milliGRAM(s) Oral daily  multivitamin 1 Tablet(s) Oral daily  polyethylene glycol 3350 17 Gram(s) Oral daily  rosuvastatin 40 milliGRAM(s) Oral at bedtime  senna 2 Tablet(s) Oral at bedtime  traZODone 25 milliGRAM(s) Oral at bedtime    MEDICATIONS  (PRN):        CAPILLARY BLOOD GLUCOSE        I&O's Summary    17 Mar 2023 07:01  -  18 Mar 2023 07:00  --------------------------------------------------------  IN: 120 mL / OUT: 200 mL / NET: -80 mL    18 Mar 2023 07:01  -  19 Mar 2023 00:09  --------------------------------------------------------  IN: 120 mL / OUT: 0 mL / NET: 120 mL        T(C): 36.8 (03-18-23 @ 21:26), Max: 36.9 (03-18-23 @ 17:15)  HR: 130 (03-18-23 @ 21:38) (88 - 130)  BP: 104/73 (03-18-23 @ 21:26) (100/60 - 105/75)  RR: 18 (03-18-23 @ 21:26) (18 - 18)  SpO2: 92% (03-18-23 @ 21:26) (92% - 95%)    PHYSICAL EXAM:    NECK: Supple, No JVD  CHEST/LUNG: Clear to auscultation bilaterally; Bl basilar crackles  HEART: Regular rate and rhythm; No murmurs, rubs, or gallops  ABDOMEN: Soft, Nontender, Nondistended; Bowel sounds present  EXTREMITIES:   No edema  NEUROLOGY: AAO       LABS:                  CAPILLARY BLOOD GLUCOSE            RADIOLOGY & ADDITIONAL TESTS:    Imaging Personally Reviewed:    Consultant(s) Notes Reviewed:      Care Discussed with Consultants/Other Providers:    Francisco Cheng MD, CMD, FACP    [address] [place] [address]  Office Tel: [phone]  Cell: [phone]

## 2023-03-19 NOTE — PROGRESS NOTE ADULT - ASSESSMENT
Echo 4/6/21: Nml LV fxn EF 61%  Echo 2/27/23: Mild anteroseptal hypokinesis EF 45-50%, Mild diastolic dysfxn, Patent foramen ovale    A/P  89 year old female from home AAO x3, with PMH of Afib on xarelto, CAD, HTN, HLD and history of right ankle fracture s/p repair 9 months ago, who presented to the ED s/p fall at home, found to have b/l pubic rami fx with hematoma and acute infarct on MRI.    #HFpEF  -s/p IV lasix w improvement  -continue IV lasix as ordered    #Acute CVA  -Likely cause of fall given reported hx and no cardiac prodrome sx  -Neuro following  -Continue atorvastatin  -Prior echo with nml lv fxn  -Echo with PFO, anteroseptal hypokinesis  -LE duplex negative for DVT  -F/u neurology  -Likely xarelto failure  -Continue apixaban    #Afib  -rapid AF yest  -now back in NSR  --cont metoprolol  -cont digoxin  -Continue apixaban  -Echo results as above    #CAD  -Continue atorvastatin    #HTN  -Continue metoprolol  -May continue to hold ace/arb for hypotension    #Pubic rami fx w/ hematoma  -Per ortho no surgical intervention  -Monitor h/h while on apixaban    #Leukocytosis  -ct chest noted  -Abx per primary, id   -id f/u    #ct chest with b/l effusions post ivf  -last cxr noted  -continue IV lasix      35 minutes spent on total encounter; more than 50% of the visit was spent counseling and/or coordinating care by the attending physician.

## 2023-03-20 NOTE — PROGRESS NOTE ADULT - SUBJECTIVE AND OBJECTIVE BOX
CARDIOLOGY FOLLOW UP - Dr. Booker  DATE OF SERVICE: 3/20/23    CC  No CV complaints    REVIEW OF SYSTEMS:  CONSTITUTIONAL: No fever, weight loss, or fatigue  RESPIRATORY: No cough, wheezing, chills or hemoptysis; No Shortness of Breath  CARDIOVASCULAR: No chest pain, palpitations, passing out, dizziness, or leg swelling  GASTROINTESTINAL: No abdominal or epigastric pain. No nausea, vomiting, or hematemesis; No diarrhea or constipation. No melena or hematochezia.  VASCULAR: No edema     PHYSICAL EXAM:  T(C): 36.4 (03-20-23 @ 11:19), Max: 36.6 (03-20-23 @ 04:21)  HR: 89 (03-20-23 @ 11:19) (86 - 94)  BP: 94/68 (03-20-23 @ 11:19) (94/68 - 116/74)  RR: 18 (03-20-23 @ 11:19) (18 - 19)  SpO2: 95% (03-20-23 @ 11:19) (95% - 97%)  Wt(kg): --  I&O's Summary    19 Mar 2023 07:01  -  20 Mar 2023 07:00  --------------------------------------------------------  IN: 0 mL / OUT: 850 mL / NET: -850 mL        Appearance: Elderly female	  Cardiovascular: Normal S1 S2,Irregular rhythm, No JVD, No murmurs  Respiratory: Mild crackles bases b/l	  Gastrointestinal:  Soft, Non-tender, + BS	  Extremities: Normal range of motion, No clubbing, cyanosis or edema      Home Medications:  acetaminophen 325 mg oral tablet: 3 tab(s) orally every 6 hours (07 Mar 2023 11:48)  apixaban 5 mg oral tablet: 1 tab(s) orally 2 times a day (07 Mar 2023 11:48)  metoprolol succinate 25 mg oral tablet, extended release: 3 tab(s) orally once a day (16 Mar 2023 18:43)  Multiple Vitamins oral tablet: 1 tab(s) orally once a day (17 Mar 2023 09:38)  Narcan 4 mg/0.1 mL nasal spray: nasal once a day, As Needed -3 minutes if no or minimal response.  . Naloxone spray 4 mg/0.1 ml intranasal, Spray 0.1 mL into one nostril. Repeat with second device into other nostril after 2-3 minutes if no or minimal response     (07 Mar 2023 11:56)  oxyCODONE 5 mg oral tablet: 0.5 tab(s) orally every 6 hours, As Needed (07 Mar 2023 11:56)  polyethylene glycol 3350 oral powder for reconstitution: 17 gram(s) orally once a day (07 Mar 2023 11:48)  rosuvastatin 40 mg oral tablet: 1 tab(s) orally once a day (at bedtime) (07 Mar 2023 11:48)      MEDICATIONS  (STANDING):  acetaminophen     Tablet .. 975 milliGRAM(s) Oral every 6 hours  apixaban 5 milliGRAM(s) Oral two times a day  chlorhexidine 2% Cloths 1 Application(s) Topical daily  dextrose 5% + sodium chloride 0.45%. 1000 milliLiter(s) (60 mL/Hr) IV Continuous <Continuous>  digoxin     Tablet 250 MICROGram(s) Oral every other day  furosemide   Injectable 20 milliGRAM(s) IV Push every 12 hours  metoprolol succinate ER 75 milliGRAM(s) Oral daily  multivitamin 1 Tablet(s) Oral daily  polyethylene glycol 3350 17 Gram(s) Oral daily  rosuvastatin 40 milliGRAM(s) Oral at bedtime  senna 2 Tablet(s) Oral at bedtime  traZODone 25 milliGRAM(s) Oral at bedtime      TELEMETRY: 	    ECG:  	  RADIOLOGY:   DIAGNOSTIC TESTING:  [ ] Echocardiogram:  [ ]  Catheterization:  [ ] Stress Test:    OTHER: 	    LABS:	 	                            11.0   12.17 )-----------( 194      ( 20 Mar 2023 06:10 )             32.7     03-19    137  |  102  |  26<H>  ----------------------------<  103<H>  4.5   |  25  |  0.90    Ca    8.8      19 Mar 2023 06:11

## 2023-03-20 NOTE — PROGRESS NOTE ADULT - PROBLEM SELECTOR PLAN 2
Cardio f/up noted  CW Lidia
Cardio f/up noted  Cw Xarelto
Cardio f/up noted  Cw Xarelto
Cardio f/up noted  CW Lidia
Cardio f/up noted  Cw Xarelto
Cardio f/up noted  CW Lidia
Cardio f/up noted  Cw Xarelto
Cardio f/up noted  Cw Xarelto
- hold home med of metoprolol for now to allow for permissive HTN  - resume home metoprolol after 2:10 on 2/25  - resume home xarelto at 15mg instead of 20mg due to age and renal function per pharmacy recs
Cardio f/up noted  CW Lidia
- hold home med of metoprolol for now to allow for permissive HTN  - resume home metoprolol after 2:10 on 2/25  - resume home xarelto at 15mg instead of 20mg due to age and renal function per pharmacy recs
Cardio f/up noted  CW Lidia
Cardio f/up noted  CW Lidia
Cardio f/up noted  Cw Xarelto
Cardio f/up noted  CW Lidia
Cardio f/up noted  CW Lidia
Cardio f/up noted  Cw Xarelto
- hold home med of metoprolol for now to allow for permissive HTN  - resume home metoprolol after 2:10 on 2/25  - resume home xarelto at 15mg instead of 20mg due to age and renal function per pharmacy recs

## 2023-03-20 NOTE — RAPID RESPONSE TEAM SUMMARY - NSSITUATIONBACKGROUNDRRT_GEN_ALL_CORE
89 year old female from home AAO x3, with PMHx of Afib on xarelto, CAD, HTN, HLD  who presented to the ED s/p fall at home. RRT called for new onset weakness on the right side. On arrival to RRT, pt in bed A&O x 3 with slight slurring of the speech. Pt also with right upper extremity weakness and right facial droop. As per the bedside nurse, last known normal at 9:25pm. Patient had a similar episode in the ED which resolved. Code stroke called. Send all labs including coags and troponin as per the stroke team. Patient taken to CT head. F/u with labs and CT head. Awaiting recommendations from the stroke team.       
89 year old female from home AAO x3, with PMH of Afib on xarelto, CAD, HTN, HLD and history of right ankle fracture s/p repair 9 months ago, who presented to the ED s/p fall at home.   RRT for code blue. Patient had no pulse. CPR was started. 5 rounds of Epi and 1 round of amiodarone were given with multiple shocks, but ROSC was not achieved. After 15 minutes of CPR, family preferred to stop CPR.  Informed primary team and family.

## 2023-03-20 NOTE — PROGRESS NOTE ADULT - ASSESSMENT
89 year old female from home AAO x3, with PMH of Afib on xarelto, CAD, HTN, HLD and history of right ankle fracture s/p repair 9 months ago, who presented to the ED s/p fall at home.     WBC of 15K on admission. Xray noted to show acute fractures of bilateral pubic rami. CT pelvis done showing acute fracture of superior and inferior pubic rami and right sacral ala. Hematoma in right lower pelvis. CXR reviewed; appears clear. Code stroke called in ED due to facial droop and right UE weakness. CT head, CTA brain and neck done with no acute abnormalities.       Acute  CVA , confirmed w MR 2/25, RUE hemiparesis, dysarthria and expressive aphasia  TTE w PFO, cardiology following  , On Eliquis 5       Hypoxia/Pleural effusion/Acute resp failure hypoxic    Likely  from fluid overload  PO Lasix  Cardio/ID f/up noted  CT chest Loculated effusion   Lasix as per Pulm Crads

## 2023-03-20 NOTE — PROVIDER CONTACT NOTE (OTHER) - RECOMMENDATIONS
Monitor
Notify Provider.
Notify provider.
Notify provider. Additional meds? Continue to monitor on tele.
provider notify
Monitor
Notify Provider
Notify provider. Continue to monitor on tele.
PA notified. Ordered fleet enema & lactulose
give am dose of metoprolol  early , tylenol 650mg po x 1 dose now. stat bmp, mg phos
iv lopressor x1 dose now, ekg
Notify provider
Notify provider.
Provider aware
provider notify,

## 2023-03-20 NOTE — DISCHARGE NOTE FOR THE EXPIRED PATIENT - TIME PATIENT WAS PRONOUNCED DEAD
20-Mar-2023 15:42 I will STOP taking the medications listed below when I get home from the hospital:  None

## 2023-03-20 NOTE — CONSULT NOTE ADULT - PROVIDER SPECIALTY LIST ADULT
Neurology
Pulmonology
Neurology
ENT
Orthopedics
Rehab Medicine
Cardiology
Infectious Disease
Gastroenterology

## 2023-03-20 NOTE — PROGRESS NOTE ADULT - ASSESSMENT
89 year old female from home AAO x3, with PMH of Afib on xarelto, CAD, HTN, HLD and history of right ankle fracture s/p repair 9 months ago, who presented to the ED s/p fall at home on 2/24.  Xray noted to show acute fractures of bilateral pubic rami. CT pelvis done showing acute fracture of superior and inferior pubic rami and right sacral ala. Hematoma in right lower pelvis. . Code stroke called in ED due to facial droop and right UE weakness. 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 TTE w PFO, Pt planned for d/c to acute rehab 3/7, however cancelled due to elevated WBC (13.31 which has uptrended to 19k today. ID called for the same.     WORKUP  UA: Negative  3/7 CXR IMPRESSION: Low lung volumes. New bibasilar opacities which could be due to atelectasis, small pleural effusions with associated passive atelectasis, and/or pneumonia.  Venous Duplex (3/2): No evidence of deep venous thrombosis in either lower extremity.  MR head (2/25): Acute infarct involving the left posterior putamen and frontal corona radiata. No acute intracranial hemorrhage.  CT Pelvis:  Acute fractures of the superior and inferior pubic rami, bilaterally and of the right sacral ala. Hematoma in the right lower pelvis with slight mass effect upon the right  lateral wall of the bladder.       DIAGNOSIS and IMPRESSION  Leukocytosis, tachycardia, SIRS   Bilateral pubic rami fractures with Pelvic Hematoma  Abnormal CT with b/l effusions with concern for loculation.         RECOMMENDATIONS  pt stable, non toxic, improving oxygen requirement, monitor off abx   reviewed CT chest with radiology, no concern for infected effusions.   pulm consulted, recommend diuretics.    trend cbc for leucocytosis, fluctuating       Plan discussed with Medicine NP and son at bedside.       Lazara Chirinos  Please contact through MS Teams   If no response or past 5 pm/weekend call [phone].

## 2023-03-20 NOTE — PROVIDER CONTACT NOTE (OTHER) - DATE AND TIME:
18-Mar-2023 16:23
13-Mar-2023 03:40
13-Mar-2023 23:46
16-Mar-2023 01:30
18-Mar-2023 10:15
07-Mar-2023 23:30
11-Mar-2023 17:50
15-Mar-2023 06:20
15-Mar-2023 14:20
10-Mar-2023 00:50
27-Feb-2023 23:00
13-Mar-2023 16:30
20-Mar-2023 08:53
08-Mar-2023 14:50
13-Mar-2023 16:40
08-Mar-2023 22:26

## 2023-03-20 NOTE — CONSULT NOTE ADULT - SUBJECTIVE AND OBJECTIVE BOX
PULMONARY CONSULT    HPI: 88 y/o F with PMH of afib (on Xarelto), CAD, HTN, HLD, R ankle fx s/p repair ~9 months ago. Presents to the ED s/p fall, found to have b/l acute fractures of superior and inferior pubic rami with hematoma in R lower pelvis. No plans for surgical intervention per ortho. Hospital course c/b code stroke in ED for acute CVA with RUE hemiparesis, dysarthria and expressive aphasia. TTE 2/27 with PFO. Most recently with persistent leukocytosis, increased WOB, hypoxia. Pulmonary called to consult for pleural effusions seen on CT chest.       PAST MEDICAL & SURGICAL HISTORY:  HTN (hypertension)  Hyperlipidemia  CAD (coronary artery disease)  Non-obstructive- Had an angiogram years ago  MVP (mitral valve prolapse)  HTN (hypertension)  Chronic atrial fibrillation  History of ankle surgery    No Known Allergies    FAMILY HISTORY:  Family history of CVA (Mother)    Social history:     Review of Systems:  CONSTITUTIONAL: No fever, chills, or fatigue  EYES: No eye pain, visual disturbances, or discharge  ENMT:  No difficulty hearing, tinnitus, vertigo; No sinus or throat pain  NECK: No pain or stiffness  RESPIRATORY: Per above  CARDIOVASCULAR: No chest pain, palpitations, dizziness, or leg swelling  GASTROINTESTINAL: No abdominal or epigastric pain. No nausea, vomiting, or hematemesis; No diarrhea or constipation. No melena or hematochezia.  GENITOURINARY: No dysuria, frequency, hematuria, or incontinence  NEUROLOGICAL: No headaches, memory loss, loss of strength, numbness, or tremors  SKIN: No itching, burning, rashes, or lesions   MUSCULOSKELETAL: No joint pain or swelling; No muscle, back, or extremity pain  PSYCHIATRIC: No depression, anxiety, mood swings, or difficulty sleeping    Medications:  MEDICATIONS  (STANDING):  acetaminophen     Tablet .. 975 milliGRAM(s) Oral every 6 hours  apixaban 5 milliGRAM(s) Oral two times a day  chlorhexidine 2% Cloths 1 Application(s) Topical daily  dextrose 5% + sodium chloride 0.45%. 1000 milliLiter(s) (60 mL/Hr) IV Continuous <Continuous>  digoxin     Tablet 250 MICROGram(s) Oral every other day  furosemide   Injectable 20 milliGRAM(s) IV Push every 12 hours  metoprolol succinate ER 75 milliGRAM(s) Oral daily  multivitamin 1 Tablet(s) Oral daily  polyethylene glycol 3350 17 Gram(s) Oral daily  rosuvastatin 40 milliGRAM(s) Oral at bedtime  senna 2 Tablet(s) Oral at bedtime  traZODone 25 milliGRAM(s) Oral at bedtime    Vital Signs Last 24 Hrs  T(C): 36.5 (20 Mar 2023 08:48), Max: 36.7 (19 Mar 2023 10:58)  T(F): 97.7 (20 Mar 2023 08:48), Max: 98.1 (19 Mar 2023 10:58)  HR: 94 (20 Mar 2023 08:48) (86 - 94)  BP: 99/68 (20 Mar 2023 08:48) (99/65 - 116/74)  BP(mean): --  RR: 18 (20 Mar 2023 08:48) (18 - 19)  SpO2: 97% (20 Mar 2023 08:48) (96% - 97%)    Parameters below as of 20 Mar 2023 08:48  Patient On (Oxygen Delivery Method): nasal cannula  O2 Flow (L/min): 3      03-19 @ 07:01  -  03-20 @ 07:00  --------------------------------------------------------  IN: 0 mL / OUT: 850 mL / NET: -850 mL    LABS:                        11.0   12.17 )-----------( 194      ( 20 Mar 2023 06:10 )             32.7     03-19    137  |  102  |  26<H>  ----------------------------<  103<H>  4.5   |  25  |  0.90    Ca    8.8      19 Mar 2023 06:11    Physical Examination:    General: No acute distress.      HEENT: Pupils equal, reactive to light.  Symmetric.    PULM: Clear to auscultation bilaterally, no significant sputum production    CVS: S1, S2    ABD: Soft, nondistended, nontender, normoactive bowel sounds, no masses    EXT: No edema, nontender    SKIN: Warm and well perfused, no rashes noted.    NEURO: Alert, oriented, interactive, nonfocal    RADIOLOGY REVIEWED    CT chest:< from: CT Chest No Cont (03.18.23 @ 18:25) >    FINDINGS:    LUNGS AND AIRWAYS: Patent central airways.  Near complete atelectasis of   the bilateral lower lobes and lingula. Scattered linear   atelectasis/scarring.  PLEURA: Moderate bilateral pleural effusions are increased from 3/10/2023   and now demonstrate loculation.  MEDIASTINUM AND MILA: No lymphadenopathy.  VESSELS: Aortic calcifications.  HEART: Heart size is normal. No pericardial effusion. Coronary artery   calcifications.  CHEST WALL AND LOWER NECK: Left chest wall loop recorder.  VISUALIZED UPPER ABDOMEN: Within normal limits.  BONES: Degenerative changes.    IMPRESSION:  Moderate bilateral pleural effusions are increased from 3/10/2023 and now   demonstrate loculation, resulting in near complete atelectasis of the   bilateral lower lobes and lingula.        --- End of Report ---      < end of copied text >    TTE:< from: TTE with Doppler (w/Cont) (02.27.23 @ 16:41) >  ------------------------------------------------------------------------  Dimensions:    Normal Values:  LA:     2.7    2.0 - 4.0 cm  Ao:     2.8    2.0 - 3.8 cm  SEPTUM: 1.0    0.6 - 1.2 cm  PWT:    0.8    0.6 - 1.1 cm  LVIDd:  4.3    3.0 - 5.6 cm  LVIDs:         1.8 - 4.0 cm  Derived variables:  LVMI: 79 g/m2  RWT: 0.37  EF (Visual Estimate): 45-50 %  Doppler Peak Velocity (m/sec): AoV=1.4  ------------------------------------------------------------------------  Observations:  Mitral Valve: Normal mitral valve. Mild mitral  regurgitation.  Aortic Valve/Aorta: Thickened trileaflet aortic valve. Peak  transaortic valve gradient equals 8 mm Hg, mean transaortic  valve gradient equals 5 mm Hg, aortic valve velocity time  integral equals 29 cm. Minimal aortic regurgitation.  Peak  left ventricular outflow tract gradient equals 3 mm Hg,  mean gradient is equal to 2 mm Hg, LVOT velocity time  integral equals 18 cm.  Aortic Root: 2.8 cm.  LVOT diameter: 1.7 cm.  Left Atrium: Normal left atrium.  LA volume index = 31  cc/m2.  Left Ventricle: Mild anteroseptalhypokinesis. Normal left  ventricular internal dimensions and wall thicknesses. Mild  diastolic dysfunction (Stage I).  Right Heart: Normal right atrium. The right ventricle is  not well visualized; grossly normal right ventricular  systolic function. Normal tricuspid valve. Normal pulmonic  valve. Mild pulmonic regurgitation.  Pericardium/Pleura: Normal pericardium with no pericardial  effusion.  Hemodynamic: Estimated right atrial pressure is 8 mm Hg.  Estimated right ventricular systolic pressure equals 18 mm  Hg, assuming right atrial pressure equals 8 mm Hg,  consistent with normal pulmonary pressures. Agitated saline  injection demonstrates evidence of a patent foramen ovale.  ------------------------------------------------------------------------  Conclusions:  1. Normal left ventricular internal dimensions and wall  thicknesses.  2. Mild anteroseptal hypokinesis.  3. Global Longitudinal Strain -14.5% (Shell, Hr, 92,  /77)  4. The right ventricle is not well visualized; grossly  normal right ventricular systolic function.  5. Agitated saline injection demonstrates evidence of a  patent foramen ovale.  *** No previous Echo exam.    < end of copied text >     PULMONARY CONSULT    HPI: 88 y/o F with PMH of afib (on Xarelto), CAD, HTN, HLD, R ankle fx s/p repair ~9 months ago. Presents to the ED s/p fall, found to have b/l acute fractures of superior and inferior pubic rami with hematoma in R lower pelvis. No plans for surgical intervention per ortho. Hospital course c/b code stroke in ED for acute CVA with RUE hemiparesis, dysarthria and expressive aphasia. TTE 2/27 with PFO. Most recently with persistent leukocytosis, increased WOB, hypoxia. Pulmonary called to consult for pleural effusions seen on CT chest. Pt awake & alert. Denies SOB/CP at this time. Minimal O2 requirements. Never smoker. Denies hx of lung disease, inhaler use. O2 sats 99% on 2LNC.       PAST MEDICAL & SURGICAL HISTORY:  HTN (hypertension)  Hyperlipidemia  CAD (coronary artery disease)  Non-obstructive- Had an angiogram years ago  MVP (mitral valve prolapse)  HTN (hypertension)  Chronic atrial fibrillation  History of ankle surgery    No Known Allergies    FAMILY HISTORY:  Family history of CVA (Mother)    Social history: never smoker    Review of Systems:  CONSTITUTIONAL: No fever, chills, or fatigue  EYES: No eye pain, visual disturbances, or discharge  ENMT:  No difficulty hearing, tinnitus, vertigo; No sinus or throat pain  NECK: No pain or stiffness  RESPIRATORY: Per above  CARDIOVASCULAR: No chest pain, palpitations, dizziness, or leg swelling  GASTROINTESTINAL: No abdominal or epigastric pain. No nausea, vomiting, or hematemesis; No diarrhea or constipation. No melena or hematochezia.  GENITOURINARY: No dysuria, frequency, hematuria, or incontinence  NEUROLOGICAL: per above  SKIN: No itching, burning, rashes, or lesions   MUSCULOSKELETAL: per above  PSYCHIATRIC: No depression, anxiety, mood swings, or difficulty sleeping    Medications:  MEDICATIONS  (STANDING):  acetaminophen     Tablet .. 975 milliGRAM(s) Oral every 6 hours  apixaban 5 milliGRAM(s) Oral two times a day  chlorhexidine 2% Cloths 1 Application(s) Topical daily  dextrose 5% + sodium chloride 0.45%. 1000 milliLiter(s) (60 mL/Hr) IV Continuous <Continuous>  digoxin     Tablet 250 MICROGram(s) Oral every other day  furosemide   Injectable 20 milliGRAM(s) IV Push every 12 hours  metoprolol succinate ER 75 milliGRAM(s) Oral daily  multivitamin 1 Tablet(s) Oral daily  polyethylene glycol 3350 17 Gram(s) Oral daily  rosuvastatin 40 milliGRAM(s) Oral at bedtime  senna 2 Tablet(s) Oral at bedtime  traZODone 25 milliGRAM(s) Oral at bedtime    Vital Signs Last 24 Hrs  T(C): 36.5 (20 Mar 2023 08:48), Max: 36.7 (19 Mar 2023 10:58)  T(F): 97.7 (20 Mar 2023 08:48), Max: 98.1 (19 Mar 2023 10:58)  HR: 94 (20 Mar 2023 08:48) (86 - 94)  BP: 99/68 (20 Mar 2023 08:48) (99/65 - 116/74)  BP(mean): --  RR: 18 (20 Mar 2023 08:48) (18 - 19)  SpO2: 97% (20 Mar 2023 08:48) (96% - 97%)    Parameters below as of 20 Mar 2023 08:48  Patient On (Oxygen Delivery Method): nasal cannula  O2 Flow (L/min): 3      03-19 @ 07:01  -  03-20 @ 07:00  --------------------------------------------------------  IN: 0 mL / OUT: 850 mL / NET: -850 mL    LABS:                        11.0   12.17 )-----------( 194      ( 20 Mar 2023 06:10 )             32.7     03-19    137  |  102  |  26<H>  ----------------------------<  103<H>  4.5   |  25  |  0.90    Ca    8.8      19 Mar 2023 06:11    Physical Examination:    General: No acute distress.      HEENT: Pupils equal, reactive to light.  Symmetric.    PULM: Decreased BS at bases     CVS: S1, S2    ABD: Soft, nondistended, nontender, normoactive bowel sounds, no masses    EXT: +1 LE edema    SKIN: Warm and well perfused, no rashes noted.    NEURO: Alert, oriented, interactive, nonfocal    RADIOLOGY REVIEWED  CXR 3/20 b/l loculated effusions/atelectasis R>L    CT chest:< from: CT Chest No Cont (03.18.23 @ 18:25) >    FINDINGS:    LUNGS AND AIRWAYS: Patent central airways.  Near complete atelectasis of   the bilateral lower lobes and lingula. Scattered linear   atelectasis/scarring.  PLEURA: Moderate bilateral pleural effusions are increased from 3/10/2023   and now demonstrate loculation.  MEDIASTINUM AND MILA: No lymphadenopathy.  VESSELS: Aortic calcifications.  HEART: Heart size is normal. No pericardial effusion. Coronary artery   calcifications.  CHEST WALL AND LOWER NECK: Left chest wall loop recorder.  VISUALIZED UPPER ABDOMEN: Within normal limits.  BONES: Degenerative changes.    IMPRESSION:  Moderate bilateral pleural effusions are increased from 3/10/2023 and now   demonstrate loculation, resulting in near complete atelectasis of the   bilateral lower lobes and lingula.    --- End of Report ---      < end of copied text >    TTE:< from: TTE with Doppler (w/Cont) (02.27.23 @ 16:41) >  ------------------------------------------------------------------------  Dimensions:    Normal Values:  LA:     2.7    2.0 - 4.0 cm  Ao:     2.8    2.0 - 3.8 cm  SEPTUM: 1.0    0.6 - 1.2 cm  PWT:    0.8    0.6 - 1.1 cm  LVIDd:  4.3    3.0 - 5.6 cm  LVIDs:         1.8 - 4.0 cm  Derived variables:  LVMI: 79 g/m2  RWT: 0.37  EF (Visual Estimate): 45-50 %  Doppler Peak Velocity (m/sec): AoV=1.4  ------------------------------------------------------------------------  Observations:  Mitral Valve: Normal mitral valve. Mild mitral  regurgitation.  Aortic Valve/Aorta: Thickened trileaflet aortic valve. Peak  transaortic valve gradient equals 8 mm Hg, mean transaortic  valve gradient equals 5 mm Hg, aortic valve velocity time  integral equals 29 cm. Minimal aortic regurgitation.  Peak  left ventricular outflow tract gradient equals 3 mm Hg,  mean gradient is equal to 2 mm Hg, LVOT velocity time  integral equals 18 cm.  Aortic Root: 2.8 cm.  LVOT diameter: 1.7 cm.  Left Atrium: Normal left atrium.  LA volume index = 31  cc/m2.  Left Ventricle: Mild anteroseptalhypokinesis. Normal left  ventricular internal dimensions and wall thicknesses. Mild  diastolic dysfunction (Stage I).  Right Heart: Normal right atrium. The right ventricle is  not well visualized; grossly normal right ventricular  systolic function. Normal tricuspid valve. Normal pulmonic  valve. Mild pulmonic regurgitation.  Pericardium/Pleura: Normal pericardium with no pericardial  effusion.  Hemodynamic: Estimated right atrial pressure is 8 mm Hg.  Estimated right ventricular systolic pressure equals 18 mm  Hg, assuming right atrial pressure equals 8 mm Hg,  consistent with normal pulmonary pressures. Agitated saline  injection demonstrates evidence of a patent foramen ovale.  ------------------------------------------------------------------------  Conclusions:  1. Normal left ventricular internal dimensions and wall  thicknesses.  2. Mild anteroseptal hypokinesis.  3. Global Longitudinal Strain -14.5% (Shell, Hr, 92,  /77)  4. The right ventricle is not well visualized; grossly  normal right ventricular systolic function.  5. Agitated saline injection demonstrates evidence of a  patent foramen ovale.  *** No previous Echo exam.    < end of copied text >

## 2023-03-20 NOTE — PROGRESS NOTE ADULT - NUTRITIONAL ASSESSMENT
This patient has been assessed with a concern for Malnutrition and has been determined to have a diagnosis/diagnoses of Moderate protein-calorie malnutrition.    This patient is being managed with:   Diet Minced and Moist-  Moderately Thick Liquids (MODTHICKLIQS)  Supplement Feeding Modality:  Oral  Ensure Enlive Cans or Servings Per Day:  2       Frequency:  Daily  Entered: Mar  6 2023 11:48AM    
This patient has been assessed with a concern for Malnutrition and has been determined to have a diagnosis/diagnoses of Moderate protein-calorie malnutrition.    This patient is being managed with:   Diet Pureed-  Moderately Thick Liquids (MODTHICKLIQS)  Entered: Mar  9 2023 12:15PM    
This patient has been assessed with a concern for Malnutrition and has been determined to have a diagnosis/diagnoses of Moderate protein-calorie malnutrition.    This patient is being managed with:   Diet Minced and Moist-  Moderately Thick Liquids (MODTHICKLIQS)  Supplement Feeding Modality:  Oral  Ensure Enlive Cans or Servings Per Day:  2       Frequency:  Daily  Entered: Mar  6 2023 11:48AM    
This patient has been assessed with a concern for Malnutrition and has been determined to have a diagnosis/diagnoses of Moderate protein-calorie malnutrition.    This patient is being managed with:   Diet Pureed-  Moderately Thick Liquids (MODTHICKLIQS)  Entered: Mar  9 2023 12:15PM    
This patient has been assessed with a concern for Malnutrition and has been determined to have a diagnosis/diagnoses of Moderate protein-calorie malnutrition.    This patient is being managed with:   Diet Minced and Moist-  Moderately Thick Liquids (MODTHICKLIQS)  Entered: Mar  2 2023  6:20PM    
This patient has been assessed with a concern for Malnutrition and has been determined to have a diagnosis/diagnoses of Moderate protein-calorie malnutrition.    This patient is being managed with:   Diet Pureed-  Moderately Thick Liquids (MODTHICKLIQS)  Entered: Mar  9 2023 12:15PM    
This patient has been assessed with a concern for Malnutrition and has been determined to have a diagnosis/diagnoses of Moderate protein-calorie malnutrition.    This patient is being managed with:   Diet Minced and Moist-  Moderately Thick Liquids (MODTHICKLIQS)  Supplement Feeding Modality:  Oral  Ensure Enlive Cans or Servings Per Day:  2       Frequency:  Daily  Entered: Mar  6 2023 11:48AM    
This patient has been assessed with a concern for Malnutrition and has been determined to have a diagnosis/diagnoses of Moderate protein-calorie malnutrition.    This patient is being managed with:   Diet Pureed-  Moderately Thick Liquids (MODTHICKLIQS)  Entered: Mar  9 2023 12:15PM    

## 2023-03-20 NOTE — PROVIDER CONTACT NOTE (OTHER) - ACTION/TREATMENT ORDERED:
DANIEL Izquierdo notified and aware. Continue to monitor on tele at this time.
provider made aware. continue to monitor for now. patient remains asymptomatic.
iv lopressor x1 dose now, ekg,
Provider notified and made aware. No further orders are given. Will continue to monitor.
Telemetry monitoring continued.
give am dose of metoprolol  early , tylenol 650mg po x 1 dose now. stat bmp, mg phos
AMBROSE isbell made aware. IVF ordered. Keep on monitoring for now
PA aware
Provider made aware. No new orders at this time. Metoprolol given.
Provider made aware. provider bedside to see pt. Order for IV tylenol.
Provider ordered EKG, BMP and NS gtt bolus given
DANIEL Swann notified and aware. As per PA, loading dose of digoxin may take time to show effect on HR. Continue to monitor on tele at this time.
PA Rita Swann notified and aware. As per PA, no immediate action at this time, PA to speak to cardiology.
CT Abdomen/Pelvis ordered. Will continue to monitor.
Provider made aware. Order for Mg and Phos am labs.

## 2023-03-20 NOTE — PROVIDER CONTACT NOTE (OTHER) - BACKGROUND
DX: R hip pain, s/p falls
adm- b/l rami fracture, R facial droop,
Admitted for b/L pubic rami fracture, stroke
Pt admitted for b/l pubic fractures s/p fall. PMH AFib, CAD, HTN, brain aneurysm. Pt has previously converted from sinus to Afib and is on digoxin, metoprolol, and eliquis.
Pt admitted for contusion of unspecified hip.
s/p fall, stroke
Pt had diffculty swallowing, on puree thick liquid diet,
r hip contusion, s/p  stroke with right sided weakness
Pt admitted for b/l pubic fractures s/p fall. PMH Afib on metoprolol & eliquis (received doses this am), CAD, HTN, HLD.
Pt admitted for b/l pubic fractures s/p fall. PMH AFib on eliquis & metoprolol, CAD, HTN.
Pt admitted for contusion of unspecified hip.
Admitted for mechanical fall, stroke.
s/p fall, stroke
Contusion of unspecified hip
Pt admitted for contusion of hip.

## 2023-03-20 NOTE — CONSULT NOTE ADULT - REASON FOR ADMISSION
fall; pubic rami fracture

## 2023-03-20 NOTE — PROGRESS NOTE ADULT - SUBJECTIVE AND OBJECTIVE BOX
89yPatient is a 89y old  Female who presents with a chief complaint of fall; pubic rami fracture (20 Mar 2023 16:03)      Interval history:  Afebrile, sitting in bed, eating, on 1L supplemental oxygen.       Allergies:   No Known Allergies      Antimicrobials:      REVIEW OF SYSTEMS:  No chest pain   No cough,   No abdominal pain  No rash.       Vital Signs Last 24 Hrs  T(C): 36.4 (23 @ 11:19), Max: 36.6 (23 @ 04:21)  T(F): 97.6 (23 @ 11:19), Max: 97.8 (23 @ 04:21)  HR: 89 (23 @ 11:19) (88 - 94)  BP: 94/68 (23 @ 11:19) (94/68 - 116/74)  BP(mean): --  RR: 18 (23 @ 11:19) (18 - 19)  SpO2: 95% (23 @ 11:19) (95% - 97%)      PHYSICAL EXAM:  Patient in no acute distress. Alert, awake. sitting in bed  Cardiovascular: S1S2 normal.  Lungs:  entry at bases.   Gastrointestinal: soft, nontender, nondistended.  IV sites not inflamed.                             11.0   12.17 )-----------( 194      ( 20 Mar 2023 06:10 )             32.7   03-    137  |  102  |  26<H>  ----------------------------<  103<H>  4.5   |  25  |  0.90    Ca    8.8      19 Mar 2023 06:11      Radiology:    < from: Xray Chest 1 View- PORTABLE-Routine (Xray Chest 1 View- PORTABLE-Routine .) (23 @ 10:04) >  IMPRESSION:  Moderate bilateral pleural effusions with associated atelectasis, similar   to prior.      < from: CT Chest No Cont (23 @ 18:25) >  IMPRESSION:  Moderate bilateral pleural effusions are increased from 3/10/2023 and now   demonstrate loculation, resulting in near complete atelectasis of the   bilateral lower lobes and lingula.

## 2023-03-20 NOTE — PROGRESS NOTE ADULT - PROVIDER SPECIALTY LIST ADULT
Cardiology
Cardiology
Infectious Disease
Rehab Medicine
Cardiology
Neurology
Rehab Medicine
Cardiology
Infectious Disease
Internal Medicine
Physiatry
Cardiology
Gastroenterology
Infectious Disease
Infectious Disease
Internal Medicine
Gastroenterology
Internal Medicine
Gastroenterology
Internal Medicine
Gastroenterology
Internal Medicine
Hospitalist
Internal Medicine

## 2023-03-20 NOTE — PROGRESS NOTE ADULT - ASSESSMENT
Echo 4/6/21: Nml LV fxn EF 61%  Echo 2/27/23: Mild anteroseptal hypokinesis EF 45-50%, Mild diastolic dysfxn, Patent foramen ovale    A/P  89 year old female from home AAO x3, with PMH of Afib on xarelto, CAD, HTN, HLD and history of right ankle fracture s/p repair 9 months ago, who presented to the ED s/p fall at home, found to have b/l pubic rami fx with hematoma and acute infarct on MRI.    #HFpEF  -s/p IV lasix w improvement  -continue IV lasix as ordered  -Will likely switch to PO tomorrow  -Please get bmp tomorrow    #Acute CVA  -Likely cause of fall given reported hx and no cardiac prodrome sx  -Neuro following  -Continue atorvastatin  -Prior echo with nml lv fxn  -Echo with PFO, anteroseptal hypokinesis  -LE duplex negative for DVT  -F/u neurology  -Likely xarelto failure  -Continue apixaban    #Afib  -S/p rapid afib  -Remains in AFib on tele  -cont metoprolol  -cont digoxin  -Continue apixaban  -Echo results as above    #CAD  -Continue atorvastatin    #HTN  -Continue metoprolol  -May continue to hold ace/arb for hypotension    #Pubic rami fx w/ hematoma  -Per ortho no surgical intervention  -Monitor h/h while on apixaban    #Leukocytosis  -ct chest noted  -Abx per primary, id   -id f/u    #ct chest with b/l effusions post ivf  -last cxr noted  -continue IV lasix      Please call/text me with any questions/concerns between 8am-4pm  [phone]

## 2023-03-20 NOTE — CONSULT NOTE ADULT - ASSESSMENT
Ortho order placed  Patient notified and voiced understanding    Dr De La Paz - please see very high medication contraindication.  Okay to send?   88 y/o F with PMH of afib (on Xarelto), CAD, HTN, HLD, R ankle fx s/p repair ~9 months ago. Presents to the ED s/p fall, found to have b/l acute fractures of superior and inferior pubic rami with hematoma in R lower pelvis. No plans for surgical intervention per ortho. Hospital course c/b code stroke in ED for acute CVA with RUE hemiparesis, dysarthria and expressive aphasia. TTE 2/27 with PFO. Most recently with persistent leukocytosis, increased WOB, hypoxia. Pulmonary called to consult for pleural effusions seen on CT chest.

## 2023-03-20 NOTE — CONSULT NOTE ADULT - PROBLEM SELECTOR RECOMMENDATION 3
-CT chest with b/l pl effusions/atelectasis. No clear infiltrate but cannot completely r/o PNA  -Afebrile, denies cough/sputum production  -Check procalcitonin  -ID f/u, currently monitoring off ABX.

## 2023-03-20 NOTE — CONSULT NOTE ADULT - NS ATTEND AMEND GEN_ALL_CORE FT
pt ok on 1l nc o2  ct with mod effusions bilaterally with atelectasis  suggest diuretics and o>I as tolerated  would not drain effusions at this point unless o2 requirements increase drastically  dw family

## 2023-03-20 NOTE — PROGRESS NOTE ADULT - PROBLEM SELECTOR PLAN 3
- patient on metoprolol and losartan at home  - will hold for now to allow for permissive HTN up to 220/120 mmHg for first 24 hours after symptom onset followed by gradual normotension per neuro recs   - monitor BP and resume BP meds tomorrow after 2:10pm
- Continue with Losartan
- Continue with Losartan
- patient on metoprolol and losartan at home  - will hold for now to allow for permissive HTN up to 220/120 mmHg for first 24 hours after symptom onset followed by gradual normotension per neuro recs   - monitor BP and resume BP meds tomorrow after 2:10pm
- Continue with Losartan
- patient on metoprolol and losartan at home  - will hold for now to allow for permissive HTN up to 220/120 mmHg for first 24 hours after symptom onset followed by gradual normotension per neuro recs   - monitor BP and resume BP meds tomorrow after 2:10pm
- Continue with Losartan
- Continue with Losartan
- patient on metoprolol and losartan at home  - will hold for now to allow for permissive HTN up to 220/120 mmHg for first 24 hours after symptom onset followed by gradual normotension per neuro recs   - monitor BP and resume BP meds tomorrow after 2:10pm
- Continue with Losartan
- patient on metoprolol and losartan at home  - will hold for now to allow for permissive HTN up to 220/120 mmHg for first 24 hours after symptom onset followed by gradual normotension per neuro recs   - monitor BP and resume BP meds tomorrow after 2:10pm
- Continue with Losartan
- Continue with Losartan
- patient on metoprolol and losartan at home  - will hold for now to allow for permissive HTN up to 220/120 mmHg for first 24 hours after symptom onset followed by gradual normotension per neuro recs   - monitor BP and resume BP meds tomorrow after 2:10pm
- patient on metoprolol and losartan at home  - will hold for now to allow for permissive HTN up to 220/120 mmHg for first 24 hours after symptom onset followed by gradual normotension per neuro recs   - monitor BP and resume BP meds tomorrow after 2:10pm
- Continue with Losartan

## 2023-03-20 NOTE — PROVIDER CONTACT NOTE (OTHER) - ASSESSMENT
Patient is asymptomatic, no c/o chest pain, SOB, VSS
VSS, /72, HR 96. Denies cp, sob, palpitations, dizziness, headaches.
vss no s/s of distress. no complaints of chest pain or palpitations
vss, no c/o dizziness; pt being cleaned at the time
Pt alert & oriented x 4. c/o of mild lightheadedness but Denies chest pain, sob, palpitations VS checked. BP- 98/61hr- 82, 95% o2 sat. temp- 97.9. patient able to follow commands and mentating well.
Pt alert & oriented x 4. resting, symptomatic. Denies chest pain, sob, palpitations. VSS
A&Ox4. Abdomen non-palpable non-tender, back without bruising or ecchymosis. VSS
Pt a&ox4. VSS except tachycardia: /60, , O2 92%, temp. 97.9. Pt asymptomatic: denies chest pain, palpitations.
Pt a&ox4. VSS except tachycardia. Pt asymptomatic.
VSS no s/s of distress noted, hr 100-150 on tele
A&Ox4. Denies chest pain, sob, dizziness or palpitations. VSS.
Pt a7ox4. VSS: BP 99/68, HR 94, O2 97% on 3Lnc, temp 97.7. Pt asymptomatic: denies chest pain or palpitations.
Pt vss, c/o 7/10 pain.
A&OX4, vitals stable, pt complaining of constipation & pain in the coccyx. Pt has not urinated for hours. Overall discomfort. Doloclox ordered. Upon cleaning pt, she had a scant amount of BM, appears impacted, unable to administer suppository.
Pt denies chest pain, sob, palpitations, headaches, dizziness. /68, HR 76.
asymptomatic, BP soft 90/50 manual

## 2023-03-20 NOTE — CHART NOTE - NSCHARTNOTESELECT_GEN_ALL_CORE
Event Note
Event Note
RRT/Code Stroke/Event Note
D/C/Event Note
Event Note
HR/Event Note
Neurology/Event Note
Neurology/Event Note
Nutrition Services
Speech and Swallow

## 2023-03-20 NOTE — CONSULT NOTE ADULT - PROBLEM SELECTOR RECOMMENDATION 9
Pt discussed in detail with Dr. Santiago, plan to   - ppi   - no further ent intervention at this time   - call prn
CT chest with b/l R>L loculated effusions, associated compressive atelectasis  -CXR this AM with b/l effusions R>L, atelectasis  -Minimal O2 requirements (currently 1-2LNC). Denies SOB  -Keep O>I as tolerated.

## 2023-03-20 NOTE — PROGRESS NOTE ADULT - SUBJECTIVE AND OBJECTIVE BOX
Chief Complaint:  Patient is a 89y old  Female who presents with a chief complaint of fall; pubic rami fracture (20 Mar 2023 08:58)      Date of service 23 @ 12:08      Interval Events:   Patient seen and examined.   Converted to afib overnight.   No GI complaints.   Eating well.   BMs yesterday.    Hospital Medications:  acetaminophen     Tablet .. 975 milliGRAM(s) Oral every 6 hours  apixaban 5 milliGRAM(s) Oral two times a day  chlorhexidine 2% Cloths 1 Application(s) Topical daily  dextrose 5% + sodium chloride 0.45%. 1000 milliLiter(s) IV Continuous <Continuous>  digoxin     Tablet 250 MICROGram(s) Oral every other day  furosemide   Injectable 20 milliGRAM(s) IV Push every 12 hours  metoprolol succinate ER 75 milliGRAM(s) Oral daily  multivitamin 1 Tablet(s) Oral daily  polyethylene glycol 3350 17 Gram(s) Oral daily  rosuvastatin 40 milliGRAM(s) Oral at bedtime  senna 2 Tablet(s) Oral at bedtime  traZODone 25 milliGRAM(s) Oral at bedtime        Review of Systems:  General:  No wt loss, fevers, chills, night sweats, fatigue,   Eyes:  Good vision, no reported pain  ENT:  No sore throat, pain, runny nose, dysphagia  CV:  No pain, palpitations, hypo/hypertension  Resp:  No dyspnea, cough, tachypnea, wheezing  GI:  See HPI  :  No pain, bleeding, incontinence, nocturia  Muscle:  No pain, weakness  Neuro:  No weakness, tingling, memory problems  Psych:  No fatigue, insomnia, mood problems, depression  Endocrine:  No polyuria, polydipsia, cold/heat intolerance  Heme:  No petechiae, ecchymosis, easy bruisability  Integumentary:  No rash, edema    PHYSICAL EXAM:   Vital Signs:  Vital Signs Last 24 Hrs  T(C): 36.4 (20 Mar 2023 11:19), Max: 36.6 (20 Mar 2023 04:21)  T(F): 97.6 (20 Mar 2023 11:19), Max: 97.8 (20 Mar 2023 04:21)  HR: 89 (20 Mar 2023 11:19) (86 - 94)  BP: 94/68 (20 Mar 2023 11:19) (94/68 - 116/74)  BP(mean): --  RR: 18 (20 Mar 2023 11:19) (18 - 19)  SpO2: 95% (20 Mar 2023 11:19) (95% - 97%)    Parameters below as of 20 Mar 2023 11:19  Patient On (Oxygen Delivery Method): nasal cannula  O2 Flow (L/min): 1    Daily     Daily Weight in k (20 Mar 2023 08:28)      PHYSICAL EXAM:     GENERAL:  Appears stated age, well-groomed, well-nourished, no distress  HEENT:  NC/AT,  conjunctivae anicteric, clear and pink,   NECK: supple, trachea midline  CHEST:  Full & symmetric excursion, no increased effort, breath sounds clear  HEART:  Regular rhythm, no JVD  ABDOMEN:  Soft, non-tender, non-distended, normoactive bowel sounds,  no masses , no hepatosplenomegaly  EXTREMITIES:  no cyanosis,clubbing or edema  SKIN:  No rash, erythema, or, ecchymoses, no jaundice  NEURO:  Alert, non-focal, no asterixis  PSYCH: Appropriate affect, oriented to place and time  RECTAL: Deferred      LABS Personally reviewed by me:                        11.0   . )-----------( 194      ( 20 Mar 2023 06:10 )             32.7     Mean Cell Volume: 98.5 fl (23 @ 06:10)    03-    137  |  102  |  26<H>  ----------------------------<  103<H>  4.5   |  25  |  0.90    Ca    8.8      19 Mar 2023 06:11                                    11.0   12. )-----------( 194      ( 20 Mar 2023 06:10 )             32.7                         11.5   11.91 )-----------( 225      ( 19 Mar 2023 06:11 )             35.2       Imaging personally reviewed by me:

## 2023-03-20 NOTE — PROGRESS NOTE ADULT - PROBLEM SELECTOR PROBLEM 2
Chronic atrial fibrillation

## 2023-03-20 NOTE — PROGRESS NOTE ADULT - TIME BILLING
Agree with above PA note.  cv stable  borderline bp secondary to arb, dec po intake, asp recautions  d/c losartan  ivf x 1 liter  dcp
Agree with above PA note.  cv stable  cont current tx  cont bb(increase for NSVT), a/c  med/id f/u
Agree with above PA note.  cv stable  cont current tx  cont bb(increase for NSVT), a/c  med/id f/u
Agree with above PA note.  cv stable  cont current tx  cont bb(increase for NSVT), a/c  med/id f/u  dcp   no cv ci to d/c
Agree with above PA note.  cv stable  cont eliquis   med/neuro f/u
Patient seen and examined.  Agree with above PA note.  cv stable  bp borderline   dec bb to 12.5  r/o infectious source  med w/u
agree with above  cv stable  cont current mgmt  followup echo  Pending CT CAP to r/o malignancy
agree with above  cv stable  cont current mgmt  followup echo  Pending CT CAP to r/o malignancy  ILR interrogation noted  neuro f/u, pt c/o worsening weakness, reimage?
Agree with above PA note.  cv stable  \cont current tx  cont bb, a/c  med/id f/u
Agree with above PA note.  cv stable  cont current tx  cont bb(increase for NSVT), a/c  med/id f/u  dcp   no cv ci to d/c
Agree with above PA note.  cv stable  cont eliquis  cont BB  dcp
Agree with above PA note.  cv stable  cont eliquis  cont BB  dcp
Agree with above PA note.  recent events noted  pt s/p cardiac arrest, intubated for possible VF arrest  d/w son  CPR discontinued per GOC
agree with above  cv stable  cont current mgmt  echo noted, + pfo  malignancy w/u  please ask neuro to see pt and comment on etiology, ? embolic ? xarelto failure
agree with above  cv stable  cont current mgmt  echo noted, + pfo  malignancy w/u  please ask neuro to see pt and comment on etiology, ? embolic ? xarelto failure   check le duplex r/o dvt

## 2023-03-20 NOTE — PROVIDER CONTACT NOTE (OTHER) - NAME OF MD/NP/PA/DO NOTIFIED:
mookie SANCHEZ
Kimberly SANCHEZ
HARRY Erazo
Sal Davison, NP
DANIEL isbell
Jose L isbell
Ju Erazo, ACP
frank SANCHEZ
DANIEL Chaves
DANIEL Swann
NP Demetriol
Aggie Coughlin
DANIEL Izquierdo
NP Bal
yandel BOGGS
DANIEL Swann

## 2023-03-20 NOTE — PROVIDER CONTACT NOTE (OTHER) - REASON
12 beats WCT for 4.5 sec . PAT 1.5 
Pt had frequent WCT 5 beats
c/o abdomen pain
HR sustaining 150s up to 160s on tele
Pt converted to AFib
pt convert from nsr to afib
8 beats of wide complex on tele
Pt converted to AFlutter.
TASIA ricketts on tele
13 beats polymorph V-Tach  3.10 sec
30 beats vtach
Pt c/o leg spasms
Pt complaining of constipation, has not had a bm/urinated
Episode of PAT 1.4/1.7 sec heart rate 158
lightheadedness
pt converted from nsr to afib

## 2023-03-20 NOTE — CONSULT NOTE ADULT - CONSULT REASON
Pelvic ring injury
Evaluate Rehabilitation Needs
code stroke
failure to thrive
Afib
new onset R weakness
hypophonia
Leukocytosis
effusions

## 2023-03-20 NOTE — PROGRESS NOTE ADULT - ASSESSMENT
1. Stroke, Afib  - on Eliquis   - per neurology     2. Oropharyngeal dysphagia & failure to thrive post stroke  - PO intake improving. eating well today. No plans for PEG at this time.     3. HTN  - per cards     4. Pubic rami fx w/ hematoma  - Per ortho no surgical intervention  - Monitor h/h while on apixaban    5. Leukocytosis  - per ID, monitoring off abx     6. Pleural effusion  - CXR this AM with b/l effusions R>L, atelectasis  - per pulm          Attending supervision statement: I have personally seen and examined the patient. I fully participated in the care of this patient. I have made amendments to the documentation where necessary, and agree with the history, physical exam, and plan as outlined by the ACP.    [facility]   Gastroenterology and Hepatology  [address] [place] [id number]  Office: [phone]  Cell: [phone]

## 2023-03-20 NOTE — PROGRESS NOTE ADULT - PROBLEM SELECTOR PLAN 1
- patient presented s/p fall  - xrays and CT pelvis done showing acute fractures of pubic rami   - ortho on board; no plans for acute intervention at this time  -Oxycodone PRN for pain  - fall precautions
- patient presented s/p fall  - xrays and CT pelvis done showing acute fractures of pubic rami   - ortho on board; no plans for acute intervention at this time  -Oxycodone PRN for pain  - fall precautions
- patient presented s/p fall  - xrays and CT pelvis done showing acute fractures of pubic rami   - ortho on board; no plans for acute intervention at this time  - pain control with tylenol 975 q6 ATC for now; IV morphine PRN for severe pain   - PT eval   - fall precautions
- patient presented s/p fall  - xrays and CT pelvis done showing acute fractures of pubic rami   - ortho on board; no plans for acute intervention at this time  -Oxycodone PRN for pain  - fall precautions
- patient presented s/p fall  - xrays and CT pelvis done showing acute fractures of pubic rami   - ortho on board; no plans for acute intervention at this time  - pain control with tylenol 975 q6 ATC for now; IV morphine PRN for severe pain   - PT eval   - fall precautions
- patient presented s/p fall  - xrays and CT pelvis done showing acute fractures of pubic rami   - ortho on board; no plans for acute intervention at this time  -Oxycodone PRN for pain  - fall precautions
- patient presented s/p fall  - xrays and CT pelvis done showing acute fractures of pubic rami   - ortho on board; no plans for acute intervention at this time  - pain control with tylenol 975 q6 ATC for now; IV morphine PRN for severe pain   - PT eval   - fall precautions
- patient presented s/p fall  - xrays and CT pelvis done showing acute fractures of pubic rami   - ortho on board; no plans for acute intervention at this time  -Oxycodone PRN for pain  - fall precautions

## 2023-03-20 NOTE — CONSULT NOTE ADULT - PROBLEM SELECTOR RECOMMENDATION 2
Likely 2nd to pleural effusions as above  -TTE also with PFO  -Minimal O2 requirements. Keep sats >90% with O2 PRN (currently 1-2LNC)  -Keep O>I as tolerated

## 2023-03-20 NOTE — CONSULT NOTE ADULT - CONSULT REQUESTED DATE/TIME
27-Feb-2023 12:52
13-Mar-2023 13:00
08-Mar-2023 16:45
24-Feb-2023 12:35
27-Feb-2023 13:44
24-Feb-2023 15:27
03-Mar-2023 20:31
25-Feb-2023 00:21
20-Mar-2023 08:58

## 2023-03-20 NOTE — DISCHARGE NOTE FOR THE EXPIRED PATIENT - HOSPITAL COURSE
89 year old female with a history of atrial fibrillation on xarelto, CAD, HTN, R ankle fracture s/p repair 9 months ago presented to the hospital with right hip pain after a mechanical fall. Patient was found to have bilateral pubic rami fractures. No ortho or neuro intervention. First day of admission patient was a code stroke due to facial droop. CTH was negative; MRI head with acute infarct. Patient also found to be hypoxic with pleural effusions. Patient received IV lasix. Pulmonology was consulted; would no drain effusions. Code blue called on 3/20 for unresponsiveness. CPR was started and patient was intubated. Patient's son, Freddy Longo, who is the patient's power of  was contacted and decided to make the patient DNR/DNI after approximately 15 minutes of resuscitation. Rapid was ended; patient pronounced dead at 15:42 on March 20, 2023 with no obvious respirations and no palpable pulse. Family and medical attending made aware

## 2023-03-20 NOTE — PROGRESS NOTE ADULT - REASON FOR ADMISSION
fall; pubic rami fracture
fall resulting in pubic rami fracture c/b CVA in hospital
fall; pubic rami fracture

## 2023-03-20 NOTE — PROGRESS NOTE ADULT - PROBLEM SELECTOR PROBLEM 3
HTN (hypertension)

## 2023-03-20 NOTE — PROGRESS NOTE ADULT - SUBJECTIVE AND OBJECTIVE BOX
Patient is a 89y old  Female who presents with a chief complaint of fall; pubic rami fracture (18 Mar 2023 20:37)      SUBJECTIVE / OVERNIGHT EVENTS:  No compliants   Son bed side     Events noted.  CONSTITUTIONAL: No fever,  or fatigue  RESPIRATORY: On supp O2  CARDIOVASCULAR: No chest pain, palpitations, dizziness, or leg swelling  GASTROINTESTINAL: No abdominal or epigastric pain. No nausea, vomiting.  NEUROLOGICAL: No headache      T(C): 36.4 (03-20-23 @ 11:19), Max: 36.6 (03-20-23 @ 04:21)  HR: 89 (03-20-23 @ 11:19) (88 - 94)  BP: 94/68 (03-20-23 @ 11:19) (94/68 - 116/74)  RR: 18 (03-20-23 @ 11:19) (18 - 19)  SpO2: 95% (03-20-23 @ 11:19) (95% - 97%)I&O's Summary      MEDICATIONS  (STANDING):  acetaminophen     Tablet .. 975 milliGRAM(s) Oral every 6 hours  apixaban 5 milliGRAM(s) Oral two times a day  chlorhexidine 2% Cloths 1 Application(s) Topical daily  dextrose 5% + sodium chloride 0.45%. 1000 milliLiter(s) (60 mL/Hr) IV Continuous <Continuous>  digoxin     Tablet 250 MICROGram(s) Oral every other day  furosemide   Injectable 20 milliGRAM(s) IV Push every 12 hours  metoprolol succinate ER 75 milliGRAM(s) Oral daily  multivitamin 1 Tablet(s) Oral daily  polyethylene glycol 3350 17 Gram(s) Oral daily  rosuvastatin 40 milliGRAM(s) Oral at bedtime  senna 2 Tablet(s) Oral at bedtime  traZODone 25 milliGRAM(s) Oral at bedtime    MEDICATIONS  (PRN):  PHYSICAL EXAM:    NECK: Supple, No JVD  CHEST/LUNG: Clear to auscultation bilaterally; Bl basilar crackles  HEART: Regular rate and rhythm; No murmurs, rubs, or gallops  ABDOMEN: Soft, Nontender, Nondistended; Bowel sounds present  EXTREMITIES:   No edema  NEUROLOGY: AAO       LABS:                  CAPILLARY BLOOD GLUCOSE            RADIOLOGY & ADDITIONAL TESTS:    Imaging Personally Reviewed:    Consultant(s) Notes Reviewed:      Care Discussed with Consultants/Other Providers:

## 2023-03-20 NOTE — CHART NOTE - NSCHARTNOTEFT_GEN_A_CORE
EXPIRATION NOTE     Patient Name:  | : 2023  | MRN: [id number]| Location: [address] [place] [address]    Notified by RN that patient was unresponsive and a code blue was called   Code team began compressions and patient was intubated   Contacted patient's son and power of , Freddy Longo, who decided to make the patient DNR after approximately 15 minutes of resuscitation   Code blue was ended  Following the code, patient was examined     Physical Examination:  No signs of respiratory effort: No spontaneous respirations, No respiratory sounds after 5 minutes of auscultation  No response to verbal stimuli: eyes remained closed, no facial changes or sounds made by patient   No response to painful stimuli: nonresponsive sternal rub, corneal reflex absent  No pupillary response to light: fixed and dilated  No central pulse: carotid, radial, femoral evaluated   No heart sounds after 5 minutes of auscultation; EKG rhythm strip shows asystole.       Patient pronounced dead at 15:42  Attending notified.   Given that the patient presented to the hospital after a fall, medical examiner was notified and this case was accepted under medical examiner    DANIEL Good   Role: Provider

## 2023-10-16 NOTE — ED PROVIDER NOTE - WR ORDER ID 1
Telephone call  Patient calling to report the  glue that they used yesterday for a laceration of her lip is coming loose.  She wanted a appointment is she could not just walk in and get it fixed.  I explained that is first come first serve in the urgent care.  She stated that she was told she could just walk in to have stiches removed by walking in.  I explained that she would still have to wait her turn.  She will come in later tonight  to have it looked at.    Leeanna Jackson RN   [facility] Nurse Advisor  12:50 PM 10/16/2023         [id number]

## 2023-11-16 NOTE — HISTORY OF PRESENT ILLNESS
[FreeTextEntry1] : Patient presents for skin examination. [de-identified] : Notes irritation of the right lower eyelid, unresponsive to soaks.  Several days. 4 = No assist / stand by assistance

## 2024-01-24 NOTE — ED ADULT NURSE NOTE - CAS TRG GENERAL NORM CIRC DET
Strong peripheral pulses acetaminophen 325 mg oral tablet: 2 tab(s) orally every 6 hours As needed Temp greater or equal to 38C (100.4F), Mild Pain (1 - 3)  atorvastatin 20 mg oral tablet: 1 tab(s) orally once a day  DULoxetine 60 mg oral delayed release capsule: 1 cap(s) orally once a day x shoulder pain  levothyroxine 50 mcg (0.05 mg) oral tablet: 1 tab(s) orally once a day  lisinopril 10 mg oral tablet: 1 tab(s) orally once a day  metoprolol succinate 50 mg oral tablet, extended release: 1 tab(s) orally once a day  omeprazole 20 mg oral delayed release capsule: 1 cap(s) orally once a day x severe acid reflux

## 2024-03-26 NOTE — ED PROVIDER NOTE - CPE EDP CARDIAC NORM
CARE TRANSITIONS (HRTIC)    Attempted to contact patient for 3rd and ED follow-up call in Care Transitions program. Left voicemail. Will attempt to contact at a later date/time.   normal...

## 2024-05-13 NOTE — PROGRESS NOTE ADULT - ASSESSMENT
89 year old female from home AAO x3, with PMH of Afib on xarelto, CAD, HTN, HLD and history of right ankle fracture s/p repair 9 months ago, who presented to the ED s/p fall at home.     WBC of 15K on admission. Xray noted to show acute fractures of bilateral pubic rami. CT pelvis done showing acute fracture of superior and inferior pubic rami and right sacral ala. Hematoma in right lower pelvis. CXR reviewed; appears clear. Code stroke called in ED due to facial droop and right UE weakness. CT head, CTA brain and neck done with no acute abnormalities.       Acute  CVA , confirmed w MR 2/25, RUE hemiparesis, dysarthria and expressive aphasia  TTE w PFO, cardiology following  ptn was on chronic AC w Xarelto, however it appears to have been  a therapeutic failure, changed to Eliquis 5 mg bid. Neuro in agreement. cardiology in agreement  PT f/up  PMR consult done, awaiting AR  MBS done, on dysphagia diet  Poor po intake- IVF  Leukocytosis: CT chest 3/10  c/w CHF  ID following, observe off ABx  Dw pt's son    GI f/up appreciated  Dw stroke team: Cont Xarelto/F/up with Dr Arias in 2 weeks     WCT Cards follow up   MED  ADJUSTMENT              Standing/Walking/Toileting/Moving from bed to chair

## 2024-10-01 NOTE — STROKE CODE NOTE - NSSTROKEABCD2TIADURATION_GEN_A_CORE
[No Acute Distress] : no acute distress [Well Nourished] : well nourished [Well Developed] : well developed [Well-Appearing] : well-appearing [PERRL] : pupils equal round and reactive to light [EOMI] : extraocular movements intact [Normal Outer Ear/Nose] : the outer ears and nose were normal in appearance [Normal Oropharynx] : the oropharynx was normal [No JVD] : no jugular venous distention [No Lymphadenopathy] : no lymphadenopathy [Supple] : supple [Thyroid Normal, No Nodules] : the thyroid was normal and there were no nodules present [No Respiratory Distress] : no respiratory distress  [No Accessory Muscle Use] : no accessory muscle use [Clear to Auscultation] : lungs were clear to auscultation bilaterally [Normal Rate] : normal rate  [Regular Rhythm] : with a regular rhythm [No Murmur] : no murmur heard [Normal S1, S2] : normal S1 and S2 [No Carotid Bruits] : no carotid bruits [No Abdominal Bruit] : a ~M bruit was not heard ~T in the abdomen [No Varicosities] : no varicosities 10-59 minutes [Pedal Pulses Present] : the pedal pulses are present [No Edema] : there was no peripheral edema [No Palpable Aorta] : no palpable aorta [No Extremity Clubbing/Cyanosis] : no extremity clubbing/cyanosis [Soft] : abdomen soft [Non Tender] : non-tender [Non-distended] : non-distended [No Masses] : no abdominal mass palpated [Normal Bowel Sounds] : normal bowel sounds [No HSM] : no HSM [Normal Posterior Cervical Nodes] : no posterior cervical lymphadenopathy [Normal Anterior Cervical Nodes] : no anterior cervical lymphadenopathy [No CVA Tenderness] : no CVA  tenderness [No Spinal Tenderness] : no spinal tenderness [No Joint Swelling] : no joint swelling [Grossly Normal Strength/Tone] : grossly normal strength/tone [No Rash] : no rash [Coordination Grossly Intact] : coordination grossly intact [No Focal Deficits] : no focal deficits [Normal Gait] : normal gait [Deep Tendon Reflexes (DTR)] : deep tendon reflexes were 2+ and symmetric [Normal Affect] : the affect was normal [Normal Insight/Judgement] : insight and judgment were intact [de-identified] : mild scleral icterus and scleral injection

## 2024-10-24 NOTE — ED PROVIDER NOTE - DOMESTIC TRAVEL HIGH RISK QUESTION
RN called pt mother to confirm 11/6 Consult.  RN called to notify her that she will need to discontinue antihistamines for 5 days prior for skin testing if pt is able to tolerate.      Mother verbalized understanding and denies any further questions or concerns.    
No

## (undated) DEVICE — VENODYNE/SCD SLEEVE CALF LARGE

## (undated) DEVICE — STRYKER INTERPULSE HANDPIECE W IRR SUCTION TUBE

## (undated) DEVICE — DRILL BIT SYNTHES ORTHO QC 3FLUTE 2.7X125MM

## (undated) DEVICE — DRSG ACE BANDAGE 4" NS

## (undated) DEVICE — DRAPE C ARM UNIVERSAL

## (undated) DEVICE — GOWN TRIMAX LG

## (undated) DEVICE — DRAPE U 47X51" NON STERILE

## (undated) DEVICE — DRSG WEBRIL 6"

## (undated) DEVICE — POSITIONER CARDIAC BUMP

## (undated) DEVICE — MARKING PEN W RULER

## (undated) DEVICE — DRILL BIT SYNTHES ORTHO QC 2.5X110MM

## (undated) DEVICE — DRAPE IOBAN 23" X 23"

## (undated) DEVICE — SUT MONOSOF 3-0 18" C-14

## (undated) DEVICE — DRILL BIT SYNTHES ORTHO CANN QC 2.7X160MM

## (undated) DEVICE — SOL IRR POUR NS 0.9% 500ML

## (undated) DEVICE — GLV 8.5 PROTEXIS (WHITE)

## (undated) DEVICE — POSITIONER HEAD REST PRONE

## (undated) DEVICE — DRSG WEBRIL 4"

## (undated) DEVICE — TOURNIQUET CUFF 34" DUAL PORT W PLC

## (undated) DEVICE — PACK EXTREMITY

## (undated) DEVICE — GLV 8 PROTEXIS (WHITE)

## (undated) DEVICE — GLV 7.5 PROTEXIS (WHITE)

## (undated) DEVICE — SUCTION YANKAUER NO CONTROL VENT

## (undated) DEVICE — MEDICATION LABELS W MARKER

## (undated) DEVICE — POSITIONER FOAM HEADREST (PINK)

## (undated) DEVICE — DRSG STOCKINETTE IMPERVIOUS LG

## (undated) DEVICE — SUT POLYSORB 2-0 30" GS-21 UNDYED

## (undated) DEVICE — GLV 7 PROTEXIS (WHITE)

## (undated) DEVICE — WARMING BLANKET UPPER ADULT

## (undated) DEVICE — POSITIONER FOAM EGG CRATE ULNAR 2PCS (PINK)

## (undated) DEVICE — GLV 6.5 PROTEXIS (WHITE)

## (undated) DEVICE — SOL IRR BAG NS 0.9% 3000ML

## (undated) DEVICE — VISITEC 4X4

## (undated) DEVICE — SOL IRR POUR H2O 250ML

## (undated) DEVICE — BLADE SCALPEL SAFETYLOCK #10

## (undated) DEVICE — POSITIONER CUSHION INSERT PRONE VIEW LG